# Patient Record
Sex: MALE | Race: BLACK OR AFRICAN AMERICAN | NOT HISPANIC OR LATINO | Employment: FULL TIME | URBAN - METROPOLITAN AREA
[De-identification: names, ages, dates, MRNs, and addresses within clinical notes are randomized per-mention and may not be internally consistent; named-entity substitution may affect disease eponyms.]

---

## 2019-04-22 ENCOUNTER — OFFICE VISIT (OUTPATIENT)
Dept: URGENT CARE | Facility: CLINIC | Age: 49
End: 2019-04-22
Payer: COMMERCIAL

## 2019-04-22 VITALS
DIASTOLIC BLOOD PRESSURE: 81 MMHG | RESPIRATION RATE: 16 BRPM | OXYGEN SATURATION: 98 % | HEIGHT: 70 IN | TEMPERATURE: 97 F | HEART RATE: 79 BPM | WEIGHT: 227 LBS | BODY MASS INDEX: 32.5 KG/M2 | SYSTOLIC BLOOD PRESSURE: 133 MMHG

## 2019-04-22 DIAGNOSIS — H10.9 CONJUNCTIVITIS OF LEFT EYE, UNSPECIFIED CONJUNCTIVITIS TYPE: Primary | ICD-10-CM

## 2019-04-22 PROCEDURE — 99204 OFFICE O/P NEW MOD 45 MIN: CPT | Mod: S$GLB,,, | Performed by: NURSE PRACTITIONER

## 2019-04-22 PROCEDURE — 99204 PR OFFICE/OUTPT VISIT, NEW, LEVL IV, 45-59 MIN: ICD-10-PCS | Mod: S$GLB,,, | Performed by: NURSE PRACTITIONER

## 2019-04-22 RX ORDER — METFORMIN HYDROCHLORIDE 500 MG/1
500 TABLET ORAL 2 TIMES DAILY WITH MEALS
COMMUNITY
End: 2020-09-08 | Stop reason: SDUPTHER

## 2019-04-22 RX ORDER — OFLOXACIN 3 MG/ML
1 SOLUTION/ DROPS OPHTHALMIC 4 TIMES DAILY
Qty: 10 ML | Refills: 0 | Status: SHIPPED | OUTPATIENT
Start: 2019-04-22 | End: 2019-04-29

## 2019-04-22 NOTE — PROGRESS NOTES
"Subjective:       Patient ID: Garret Munguia is a 49 y.o. male.    Vitals:  height is 5' 10" (1.778 m) and weight is 103 kg (227 lb). His oral temperature is 97.3 °F (36.3 °C). His blood pressure is 133/81 and his pulse is 79. His respiration is 16 and oxygen saturation is 98%.     Chief Complaint: Eye Pain (Lt)    Pt presents with Lt eye pain w/ redness X 4/20/19. Pt denies FB. Pt describes pain as irritated sensation, constant timing with assoc itching. He denies blurred vision.     Eye Pain    The left eye is affected. This is a new problem. The current episode started in the past 7 days. Associated symptoms include an eye discharge, eye redness and itching. Pertinent negatives include no blurred vision, double vision, fever, nausea, photophobia or vomiting. Treatments tried: visine drops, wanda.       Constitution: Negative for chills and fever.   HENT: Negative for congestion and sinus pain.    Eyes: Positive for eye discharge, eye itching, eye pain and eye redness. Negative for eye trauma, foreign body in eye, photophobia, vision loss, double vision, blurred vision and eyelid swelling.   Gastrointestinal: Negative for nausea and vomiting.   Skin: Negative for rash.   Allergic/Immunologic: Negative for seasonal allergies and itching.   Neurological: Negative for headaches.       Objective:      Physical Exam   Constitutional: He is oriented to person, place, and time. He appears well-developed and well-nourished.   HENT:   Head: Normocephalic and atraumatic.   Right Ear: External ear normal.   Left Ear: External ear normal.   Nose: Nose normal.   Mouth/Throat: Oropharynx is clear and moist.   Eyes: Pupils are equal, round, and reactive to light. EOM and lids are normal. Left conjunctiva is injected.   Neck: Trachea normal, full passive range of motion without pain and phonation normal. Neck supple.   Musculoskeletal: Normal range of motion.   Neurological: He is alert and oriented to person, place, and time. "   Skin: Skin is warm, dry and intact.   Psychiatric: He has a normal mood and affect. His speech is normal and behavior is normal. Judgment and thought content normal. Cognition and memory are normal.   Nursing note and vitals reviewed.      Assessment:       1. Conjunctivitis of left eye, unspecified conjunctivitis type        Plan:         Conjunctivitis of left eye, unspecified conjunctivitis type    Other orders  -     ofloxacin (OCUFLOX) 0.3 % ophthalmic solution; Place 1 drop into the left eye 4 (four) times daily. for 7 days  Dispense: 10 mL; Refill: 0

## 2019-04-22 NOTE — PATIENT INSTRUCTIONS
Conjunctivitis, Bacterial    You have an infection in the membranes covering the white part of the eye. This part of the eye is called the conjunctiva. The infection is called conjunctivitis. The most common symptoms of conjunctivitis include a thick, pus-like discharge from the eye, swollen eyelids, redness, eyelids sticking together upon awakening, and a gritty or scratchy feeling in the eye. Your infection was caused by bacteria. It may be treated with medicine. With treatment, the infection takes about 7 to 10 days to resolve.  Home care  · Use prescribed antibiotic eye drops or ointment as directed to treat the infection.  · Apply a warm compress (towel soaked in warm water) to the affected eye 3 to 4 times a day. Do this just before applying medicine to the eye.  · Use a warm, wet cloth to wipe away crusting of the eyelids in the morning. This is caused by mucus drainage during the night. You may also use saline irrigating solution or artificial tears to rinse away mucus in the eye. Do not put a patch over the eye.  · Wash your hands before and after touching the infected eye. This is to prevent spreading the infection to the other eye, and to other people. Do not share your towels or washcloths with others.  · You may use acetaminophen or ibuprofen to control pain, unless another medicine was prescribed. (Note: If you have chronic liver or kidney disease or have ever had a stomach ulcer or gastrointestinal bleeding, talk with your doctor before using these medicines.)  · Do not wear contact lenses until your eyes have healed and all symptoms are gone.  Follow-up care  Follow up with your healthcare provider, or as advised.  When to seek medical advice  Call your healthcare provider right away if any of these occur:  · Worsening vision  · Increasing pain in the eye  · Increasing swelling or redness of the eyelid  · Redness spreading around the eye  Date Last Reviewed: 6/14/2015  © 6127-4610 [employer]. [address] [place] [address], [place] [address]. All rights reserved. This information is not intended as a substitute for professional medical care. Always follow your healthcare professional's instructions.

## 2019-04-22 NOTE — LETTER
April 22, 2019      [facility]  [address]  [place] [address]  Phone: [phone]       Patient: Garret Munguia   YOB: 1970  Date of Visit: 04/22/2019    To Whom It May Concern:    ABIGAIL Munguia  was at [facility] on 04/22/2019. He may return to work/school on 4/24/19  with no restrictions. If you have any questions or concerns, or if I can be of further assistance, please do not hesitate to contact me.    Sincerely,    Felipa Koch, NP

## 2020-03-24 ENCOUNTER — OFFICE VISIT (OUTPATIENT)
Dept: URGENT CARE | Facility: CLINIC | Age: 50
End: 2020-03-24
Payer: COMMERCIAL

## 2020-03-24 VITALS
BODY MASS INDEX: 31.78 KG/M2 | HEIGHT: 71 IN | WEIGHT: 227 LBS | RESPIRATION RATE: 18 BRPM | DIASTOLIC BLOOD PRESSURE: 89 MMHG | HEART RATE: 83 BPM | OXYGEN SATURATION: 97 % | SYSTOLIC BLOOD PRESSURE: 136 MMHG | TEMPERATURE: 99 F

## 2020-03-24 DIAGNOSIS — R05.9 COUGH: ICD-10-CM

## 2020-03-24 DIAGNOSIS — R09.82 ALLERGIC RHINITIS WITH POSTNASAL DRIP: Primary | ICD-10-CM

## 2020-03-24 DIAGNOSIS — J30.9 ALLERGIC RHINITIS WITH POSTNASAL DRIP: Primary | ICD-10-CM

## 2020-03-24 PROCEDURE — 99214 OFFICE O/P EST MOD 30 MIN: CPT | Mod: S$GLB,,, | Performed by: NURSE PRACTITIONER

## 2020-03-24 PROCEDURE — 99214 PR OFFICE/OUTPT VISIT, EST, LEVL IV, 30-39 MIN: ICD-10-PCS | Mod: S$GLB,,, | Performed by: NURSE PRACTITIONER

## 2020-03-24 RX ORDER — CETIRIZINE HYDROCHLORIDE 10 MG/1
10 TABLET ORAL DAILY
Qty: 90 TABLET | Refills: 0 | Status: SHIPPED | OUTPATIENT
Start: 2020-03-24 | End: 2020-09-21

## 2020-03-24 RX ORDER — GUAIFENESIN 1200 MG/1
1200 TABLET, EXTENDED RELEASE ORAL 2 TIMES DAILY
Qty: 20 TABLET | Refills: 0 | Status: SHIPPED | OUTPATIENT
Start: 2020-03-24 | End: 2020-04-03

## 2020-03-24 RX ORDER — FLUTICASONE PROPIONATE 50 MCG
2 SPRAY, SUSPENSION (ML) NASAL DAILY
Qty: 16 G | Refills: 0 | Status: SHIPPED | OUTPATIENT
Start: 2020-03-24 | End: 2020-09-21

## 2020-03-24 NOTE — PROGRESS NOTES
"Subjective:       Patient ID: Garret Munguia is a 50 y.o. male.    Vitals:  height is 5' 11" (1.803 m) and weight is 103 kg (227 lb). His temperature is 98.7 °F (37.1 °C). His blood pressure is 136/89 and his pulse is 83. His respiration is 18 and oxygen saturation is 97%.     Chief Complaint: Sinus Problem    Patient presents today with complaints of post nasal drainage, "tickle" in his throat and a cough that started today. He denies f/c/n/v/d. He has not taken anything for his symptoms. He is concerned about COVID 10 and states that he came here tonight to have his family tested for the virus.     Sinus Problem   This is a new problem. The current episode started today. The problem is unchanged. There has been no fever. Associated symptoms include coughing. Pertinent negatives include no chills, congestion, headaches, shortness of breath, sinus pressure or sore throat. (Itchy throat , sinus drip ) Past treatments include nothing. The treatment provided no relief.       Constitution: Negative for chills, fatigue and fever.   HENT: Positive for postnasal drip. Negative for congestion, sinus pressure and sore throat.    Neck: Negative for painful lymph nodes.   Cardiovascular: Negative for chest pain and leg swelling.   Respiratory: Positive for cough. Negative for shortness of breath.    Gastrointestinal: Negative for nausea, vomiting and diarrhea.   Musculoskeletal: Negative for joint pain, joint swelling, muscle cramps and muscle ache.   Skin: Negative for color change, pale and rash.   Allergic/Immunologic: Negative for seasonal allergies.   Neurological: Negative for dizziness, history of vertigo, light-headedness, passing out and headaches.   Hematologic/Lymphatic: Negative for swollen lymph nodes, easy bruising/bleeding and history of blood clots. Does not bruise/bleed easily.   Psychiatric/Behavioral: Negative for nervous/anxious, sleep disturbance and depression. The patient is not nervous/anxious.      "   Objective:      Physical Exam   Constitutional: He is oriented to person, place, and time. He appears well-developed and well-nourished.   HENT:   Head: Normocephalic and atraumatic.   Right Ear: Tympanic membrane normal.   Left Ear: A middle ear effusion is present.   Nose: Mucosal edema present. Right sinus exhibits no maxillary sinus tenderness and no frontal sinus tenderness. Left sinus exhibits no maxillary sinus tenderness and no frontal sinus tenderness.   Mouth/Throat: Posterior oropharyngeal erythema and cobblestoning present.   Moderate amount of white/clear thick post nasal drainage noted   Eyes: No scleral icterus.   Cardiovascular: Normal rate, regular rhythm and normal heart sounds.   Pulmonary/Chest: Effort normal and breath sounds normal. No stridor. No respiratory distress. He has no wheezes. He has no rales.   Neurological: He is alert and oriented to person, place, and time.   Skin: Capillary refill takes less than 2 seconds.   Psychiatric: He has a normal mood and affect. His behavior is normal. Judgment and thought content normal.         Assessment:       1. Allergic rhinitis with postnasal drip    2. Cough        Plan:         Allergic rhinitis with postnasal drip  -     guaiFENesin 1,200 mg Ta12; Take 1,200 mg by mouth 2 (two) times daily. for 10 days  Dispense: 20 tablet; Refill: 0  -     cetirizine (ZYRTEC) 10 MG tablet; Take 1 tablet (10 mg total) by mouth once daily.  Dispense: 90 tablet; Refill: 0  -     fluticasone propionate (FLONASE) 50 mcg/actuation nasal spray; 2 sprays (100 mcg total) by Each Nostril route once daily.  Dispense: 16 g; Refill: 0    Cough  -     guaiFENesin 1,200 mg Ta12; Take 1,200 mg by mouth 2 (two) times daily. for 10 days  Dispense: 20 tablet; Refill: 0  -     cetirizine (ZYRTEC) 10 MG tablet; Take 1 tablet (10 mg total) by mouth once daily.  Dispense: 90 tablet; Refill: 0  -     fluticasone propionate (FLONASE) 50 mcg/actuation nasal spray; 2 sprays (100 mcg  total) by Each Nostril route once daily.  Dispense: 16 g; Refill: 0    Discussed with patient that he does not present with symptoms of COVID. Discussed with him the requirements for testing and the availability of the testing centers. Discussed symptoms to be concerned about and symptoms to be watchful for. Patient verbalizes understanding.

## 2020-05-05 DIAGNOSIS — J30.9 ALLERGIC RHINITIS WITH POSTNASAL DRIP: ICD-10-CM

## 2020-05-05 DIAGNOSIS — R09.82 ALLERGIC RHINITIS WITH POSTNASAL DRIP: ICD-10-CM

## 2020-05-05 DIAGNOSIS — R05.9 COUGH: ICD-10-CM

## 2020-05-11 RX ORDER — FLUTICASONE PROPIONATE 50 MCG
SPRAY, SUSPENSION (ML) NASAL
Qty: 16 G | Refills: 0 | OUTPATIENT
Start: 2020-05-11

## 2020-09-04 ENCOUNTER — OFFICE VISIT (OUTPATIENT)
Dept: URGENT CARE | Facility: CLINIC | Age: 50
End: 2020-09-04
Payer: COMMERCIAL

## 2020-09-04 ENCOUNTER — HOSPITAL ENCOUNTER (INPATIENT)
Facility: HOSPITAL | Age: 50
LOS: 3 days | Discharge: HOME HEALTH | DRG: 623 | End: 2020-09-08
Attending: EMERGENCY MEDICINE | Admitting: HOSPITALIST
Payer: COMMERCIAL

## 2020-09-04 VITALS
OXYGEN SATURATION: 98 % | HEIGHT: 71 IN | SYSTOLIC BLOOD PRESSURE: 114 MMHG | WEIGHT: 214 LBS | DIASTOLIC BLOOD PRESSURE: 74 MMHG | HEART RATE: 90 BPM | RESPIRATION RATE: 16 BRPM | BODY MASS INDEX: 29.96 KG/M2 | TEMPERATURE: 99 F

## 2020-09-04 DIAGNOSIS — L08.9 DIABETIC INFECTION OF RIGHT FOOT: ICD-10-CM

## 2020-09-04 DIAGNOSIS — M79.89 RIGHT LEG SWELLING: ICD-10-CM

## 2020-09-04 DIAGNOSIS — E11.9 TYPE 2 DIABETES MELLITUS WITHOUT COMPLICATION, UNSPECIFIED WHETHER LONG TERM INSULIN USE: Primary | ICD-10-CM

## 2020-09-04 DIAGNOSIS — L97.519 FOOT ULCERATION, RIGHT, WITH UNSPECIFIED SEVERITY: ICD-10-CM

## 2020-09-04 DIAGNOSIS — L97.511 DIABETIC ULCER OF RIGHT FOOT ASSOCIATED WITH DIABETES MELLITUS DUE TO UNDERLYING CONDITION, LIMITED TO BREAKDOWN OF SKIN, UNSPECIFIED PART OF FOOT: ICD-10-CM

## 2020-09-04 DIAGNOSIS — E11.628 DIABETIC INFECTION OF RIGHT FOOT: ICD-10-CM

## 2020-09-04 DIAGNOSIS — E08.621 DIABETIC ULCER OF RIGHT FOOT ASSOCIATED WITH DIABETES MELLITUS DUE TO UNDERLYING CONDITION, LIMITED TO BREAKDOWN OF SKIN, UNSPECIFIED PART OF FOOT: ICD-10-CM

## 2020-09-04 DIAGNOSIS — L97.509 TYPE 2 DIABETES MELLITUS WITH FOOT ULCER, WITHOUT LONG-TERM CURRENT USE OF INSULIN: Primary | ICD-10-CM

## 2020-09-04 DIAGNOSIS — E11.621 TYPE 2 DIABETES MELLITUS WITH FOOT ULCER, WITHOUT LONG-TERM CURRENT USE OF INSULIN: Primary | ICD-10-CM

## 2020-09-04 LAB
ALBUMIN SERPL BCP-MCNC: 3.3 G/DL (ref 3.5–5.2)
ALP SERPL-CCNC: 80 U/L (ref 55–135)
ALT SERPL W/O P-5'-P-CCNC: 15 U/L (ref 10–44)
ANION GAP SERPL CALC-SCNC: 9 MMOL/L (ref 8–16)
AST SERPL-CCNC: 13 U/L (ref 10–40)
B-OH-BUTYR BLD STRIP-SCNC: 0.2 MMOL/L (ref 0–0.5)
BASOPHILS # BLD AUTO: 0.02 K/UL (ref 0–0.2)
BASOPHILS NFR BLD: 0.2 % (ref 0–1.9)
BILIRUB SERPL-MCNC: 0.3 MG/DL (ref 0.1–1)
BUN SERPL-MCNC: 13 MG/DL (ref 6–20)
CALCIUM SERPL-MCNC: 9.5 MG/DL (ref 8.7–10.5)
CHLORIDE SERPL-SCNC: 99 MMOL/L (ref 95–110)
CO2 SERPL-SCNC: 30 MMOL/L (ref 23–29)
CREAT SERPL-MCNC: 1 MG/DL (ref 0.5–1.4)
DIFFERENTIAL METHOD: ABNORMAL
EOSINOPHIL # BLD AUTO: 0.1 K/UL (ref 0–0.5)
EOSINOPHIL NFR BLD: 1.2 % (ref 0–8)
ERYTHROCYTE [DISTWIDTH] IN BLOOD BY AUTOMATED COUNT: 11.2 % (ref 11.5–14.5)
EST. GFR  (AFRICAN AMERICAN): >60 ML/MIN/1.73 M^2
EST. GFR  (NON AFRICAN AMERICAN): >60 ML/MIN/1.73 M^2
GLUCOSE SERPL-MCNC: 287 MG/DL (ref 70–110)
GLUCOSE SERPL-MCNC: 398 MG/DL (ref 70–110)
HCT VFR BLD AUTO: 39.5 % (ref 40–54)
HGB BLD-MCNC: 12.2 G/DL (ref 14–18)
IMM GRANULOCYTES # BLD AUTO: 0.02 K/UL (ref 0–0.04)
IMM GRANULOCYTES NFR BLD AUTO: 0.2 % (ref 0–0.5)
LACTATE SERPL-SCNC: 0.7 MMOL/L (ref 0.5–2.2)
LACTATE SERPL-SCNC: 0.8 MMOL/L (ref 0.5–2.2)
LYMPHOCYTES # BLD AUTO: 1.9 K/UL (ref 1–4.8)
LYMPHOCYTES NFR BLD: 23.7 % (ref 18–48)
MCH RBC QN AUTO: 27.4 PG (ref 27–31)
MCHC RBC AUTO-ENTMCNC: 30.9 G/DL (ref 32–36)
MCV RBC AUTO: 89 FL (ref 82–98)
MONOCYTES # BLD AUTO: 0.7 K/UL (ref 0.3–1)
MONOCYTES NFR BLD: 8.8 % (ref 4–15)
NEUTROPHILS # BLD AUTO: 5.3 K/UL (ref 1.8–7.7)
NEUTROPHILS NFR BLD: 65.9 % (ref 38–73)
NRBC BLD-RTO: 0 /100 WBC
PLATELET # BLD AUTO: 344 K/UL (ref 150–350)
PMV BLD AUTO: 8.5 FL (ref 9.2–12.9)
POTASSIUM SERPL-SCNC: 4.8 MMOL/L (ref 3.5–5.1)
PROT SERPL-MCNC: 7.3 G/DL (ref 6–8.4)
RBC # BLD AUTO: 4.45 M/UL (ref 4.6–6.2)
SARS-COV-2 RDRP RESP QL NAA+PROBE: NEGATIVE
SODIUM SERPL-SCNC: 138 MMOL/L (ref 136–145)
WBC # BLD AUTO: 8.1 K/UL (ref 3.9–12.7)

## 2020-09-04 PROCEDURE — 36415 COLL VENOUS BLD VENIPUNCTURE: CPT

## 2020-09-04 PROCEDURE — 82962 GLUCOSE BLOOD TEST: CPT | Mod: ,,, | Performed by: NURSE PRACTITIONER

## 2020-09-04 PROCEDURE — 87040 BLOOD CULTURE FOR BACTERIA: CPT

## 2020-09-04 PROCEDURE — 82010 KETONE BODYS QUAN: CPT

## 2020-09-04 PROCEDURE — G0378 HOSPITAL OBSERVATION PER HR: HCPCS

## 2020-09-04 PROCEDURE — 96365 THER/PROPH/DIAG IV INF INIT: CPT | Performed by: EMERGENCY MEDICINE

## 2020-09-04 PROCEDURE — 25000003 PHARM REV CODE 250: Performed by: EMERGENCY MEDICINE

## 2020-09-04 PROCEDURE — 94761 N-INVAS EAR/PLS OXIMETRY MLT: CPT

## 2020-09-04 PROCEDURE — 87186 SC STD MICRODIL/AGAR DIL: CPT | Mod: 59

## 2020-09-04 PROCEDURE — U0002 COVID-19 LAB TEST NON-CDC: HCPCS

## 2020-09-04 PROCEDURE — 99214 OFFICE O/P EST MOD 30 MIN: CPT | Mod: S$GLB,,, | Performed by: NURSE PRACTITIONER

## 2020-09-04 PROCEDURE — 80053 COMPREHEN METABOLIC PANEL: CPT

## 2020-09-04 PROCEDURE — 87077 CULTURE AEROBIC IDENTIFY: CPT | Mod: 59

## 2020-09-04 PROCEDURE — 96375 TX/PRO/DX INJ NEW DRUG ADDON: CPT | Performed by: EMERGENCY MEDICINE

## 2020-09-04 PROCEDURE — 63600175 PHARM REV CODE 636 W HCPCS: Performed by: EMERGENCY MEDICINE

## 2020-09-04 PROCEDURE — 99285 EMERGENCY DEPT VISIT HI MDM: CPT | Mod: 25

## 2020-09-04 PROCEDURE — 82962 POCT GLUCOSE, HAND-HELD DEVICE: ICD-10-PCS | Mod: ,,, | Performed by: NURSE PRACTITIONER

## 2020-09-04 PROCEDURE — 85025 COMPLETE CBC W/AUTO DIFF WBC: CPT

## 2020-09-04 PROCEDURE — 83605 ASSAY OF LACTIC ACID: CPT

## 2020-09-04 PROCEDURE — 87070 CULTURE OTHR SPECIMN AEROBIC: CPT

## 2020-09-04 PROCEDURE — 99214 PR OFFICE/OUTPT VISIT, EST, LEVL IV, 30-39 MIN: ICD-10-PCS | Mod: S$GLB,,, | Performed by: NURSE PRACTITIONER

## 2020-09-04 RX ORDER — SODIUM CHLORIDE 0.9 % (FLUSH) 0.9 %
10 SYRINGE (ML) INJECTION
Status: DISCONTINUED | OUTPATIENT
Start: 2020-09-04 | End: 2020-09-08 | Stop reason: HOSPADM

## 2020-09-04 RX ORDER — INSULIN ASPART 100 [IU]/ML
1-10 INJECTION, SOLUTION INTRAVENOUS; SUBCUTANEOUS
Status: DISCONTINUED | OUTPATIENT
Start: 2020-09-04 | End: 2020-09-08 | Stop reason: HOSPADM

## 2020-09-04 RX ORDER — ACETAMINOPHEN 325 MG/1
650 TABLET ORAL EVERY 6 HOURS PRN
Status: DISCONTINUED | OUTPATIENT
Start: 2020-09-04 | End: 2020-09-08 | Stop reason: HOSPADM

## 2020-09-04 RX ORDER — ONDANSETRON 2 MG/ML
4 INJECTION INTRAMUSCULAR; INTRAVENOUS EVERY 6 HOURS PRN
Status: DISCONTINUED | OUTPATIENT
Start: 2020-09-04 | End: 2020-09-08 | Stop reason: HOSPADM

## 2020-09-04 RX ORDER — POTASSIUM CHLORIDE 1.5 G/1.58G
40 POWDER, FOR SOLUTION ORAL
Status: DISCONTINUED | OUTPATIENT
Start: 2020-09-04 | End: 2020-09-08 | Stop reason: HOSPADM

## 2020-09-04 RX ORDER — HYDROCODONE BITARTRATE AND ACETAMINOPHEN 5; 325 MG/1; MG/1
1 TABLET ORAL EVERY 6 HOURS PRN
Status: DISCONTINUED | OUTPATIENT
Start: 2020-09-04 | End: 2020-09-08 | Stop reason: HOSPADM

## 2020-09-04 RX ORDER — IBUPROFEN 200 MG
16 TABLET ORAL
Status: DISCONTINUED | OUTPATIENT
Start: 2020-09-04 | End: 2020-09-08 | Stop reason: HOSPADM

## 2020-09-04 RX ORDER — IBUPROFEN 200 MG
24 TABLET ORAL
Status: DISCONTINUED | OUTPATIENT
Start: 2020-09-04 | End: 2020-09-08 | Stop reason: HOSPADM

## 2020-09-04 RX ORDER — LANOLIN ALCOHOL/MO/W.PET/CERES
800 CREAM (GRAM) TOPICAL
Status: DISCONTINUED | OUTPATIENT
Start: 2020-09-04 | End: 2020-09-08 | Stop reason: HOSPADM

## 2020-09-04 RX ORDER — ACETAMINOPHEN 325 MG/1
650 TABLET ORAL EVERY 4 HOURS PRN
Status: DISCONTINUED | OUTPATIENT
Start: 2020-09-04 | End: 2020-09-08 | Stop reason: HOSPADM

## 2020-09-04 RX ORDER — TALC
9 POWDER (GRAM) TOPICAL NIGHTLY PRN
Status: DISCONTINUED | OUTPATIENT
Start: 2020-09-04 | End: 2020-09-08 | Stop reason: HOSPADM

## 2020-09-04 RX ORDER — GLUCAGON 1 MG
1 KIT INJECTION
Status: DISCONTINUED | OUTPATIENT
Start: 2020-09-04 | End: 2020-09-08 | Stop reason: HOSPADM

## 2020-09-04 RX ORDER — AMOXICILLIN 250 MG
1 CAPSULE ORAL 2 TIMES DAILY
Status: DISCONTINUED | OUTPATIENT
Start: 2020-09-05 | End: 2020-09-08 | Stop reason: HOSPADM

## 2020-09-04 RX ADMIN — PIPERACILLIN SODIUM AND TAZOBACTAM SODIUM 4.5 G: 4; .5 INJECTION, POWDER, LYOPHILIZED, FOR SOLUTION INTRAVENOUS at 09:09

## 2020-09-04 RX ADMIN — VANCOMYCIN HYDROCHLORIDE 2250 MG: 1.25 INJECTION, POWDER, LYOPHILIZED, FOR SOLUTION INTRAVENOUS at 09:09

## 2020-09-04 NOTE — PROGRESS NOTES
"Subjective:       Patient ID: Garret Munguia is a 50 y.o. male.    Vitals:  height is 5' 11" (1.803 m) and weight is 97.1 kg (214 lb). His oral temperature is 99.1 °F (37.3 °C). His blood pressure is 114/74 and his pulse is 90. His respiration is 16 and oxygen saturation is 98%.     Chief Complaint: Recurrent Skin Infections (also c/o left eye dryness and post nasal drip)    Patient complains of a "sore" on the bottom of his right foot for 2 weeks. Patient reports he is diabetic and does not have a PCP. Denies fever.     Abscess  Chronicity:  NewProgression Since Onset: gradually improving  Location:  Foot  Associated Symptoms: no fever, no chills  Characteristics: redness    Relieved by:  Topical antibiotics      Constitution: Negative for chills, fatigue and fever.   HENT: Negative for congestion and sore throat.    Neck: Negative for painful lymph nodes.   Cardiovascular: Negative for chest pain and leg swelling.   Eyes: Negative for double vision and blurred vision.   Respiratory: Negative for cough and shortness of breath.    Gastrointestinal: Negative for nausea, vomiting and diarrhea.   Genitourinary: Negative for dysuria, frequency and urgency.   Musculoskeletal: Negative for joint pain, joint swelling, muscle cramps and muscle ache.   Skin: Positive for lesion. Negative for color change, pale, rash and abscess.   Allergic/Immunologic: Negative for seasonal allergies.   Neurological: Negative for dizziness, history of vertigo, light-headedness, passing out and headaches.   Hematologic/Lymphatic: Negative for swollen lymph nodes, easy bruising/bleeding and history of blood clots. Does not bruise/bleed easily.   Psychiatric/Behavioral: Negative for nervous/anxious, sleep disturbance and depression. The patient is not nervous/anxious.        Objective:      Physical Exam   Constitutional: He is oriented to person, place, and time. He appears well-developed. He is cooperative.  Non-toxic appearance. He does not " appear ill. No distress.   HENT:   Head: Normocephalic and atraumatic.   Ears:   Right Ear: Hearing, tympanic membrane, external ear and ear canal normal.   Left Ear: Hearing, tympanic membrane, external ear and ear canal normal.   Nose: Nose normal. No mucosal edema, rhinorrhea or nasal deformity. No epistaxis. Right sinus exhibits no maxillary sinus tenderness and no frontal sinus tenderness. Left sinus exhibits no maxillary sinus tenderness and no frontal sinus tenderness.   Mouth/Throat: Uvula is midline, oropharynx is clear and moist and mucous membranes are normal. No trismus in the jaw. Normal dentition. No uvula swelling. No posterior oropharyngeal erythema.   Eyes: Conjunctivae and lids are normal. Right eye exhibits no discharge. Left eye exhibits no discharge. No scleral icterus.   Neck: Trachea normal, normal range of motion, full passive range of motion without pain and phonation normal. Neck supple.   Cardiovascular: Normal rate, regular rhythm, normal heart sounds and normal pulses.   Pulmonary/Chest: Effort normal and breath sounds normal. No respiratory distress.   Abdominal: Soft. Normal appearance and bowel sounds are normal. He exhibits no distension, no pulsatile midline mass and no mass. There is no abdominal tenderness.   Musculoskeletal: Normal range of motion.         General: No deformity.   Neurological: He is alert and oriented to person, place, and time. He exhibits normal muscle tone. Coordination normal.   Skin: Skin is warm, dry, intact, not diaphoretic and not pale.         Comments: Large ulcer noted to plantar aspect of right foot with surrounding erythema and swelling to right foot and ankle.  Psychiatric: His speech is normal and behavior is normal. Judgment and thought content normal.   Nursing note and vitals reviewed.            Assessment:       1. Type 2 diabetes mellitus without complication, unspecified whether long term insulin use    2. Diabetic ulcer of right foot  associated with diabetes mellitus due to underlying condition, limited to breakdown of skin, unspecified part of foot        Plan:       CBG = 398    Advised patient that they need to go to the Emergency Room for further evaluation for uncontrolled diabetes and diabetic ulcer to right foot.  Offered pt EMS transport to the hospital; however, the patient declined and will drive self or have family member drive to the ER. Pt is alert and oriented to person, place, and situation.  I explained, in layman's terms, the risks associated with private vehicle transport such as, but not limited to: respiratory arrest, cardiac arrest, and death. The patient voices these risks back to me.   Patient is not altered and is not under the influence and is competent to make own decisions.     Type 2 diabetes mellitus without complication, unspecified whether long term insulin use  -     POCT Glucose, Hand-Held Device    Diabetic ulcer of right foot associated with diabetes mellitus due to underlying condition, limited to breakdown of skin, unspecified part of foot

## 2020-09-05 ENCOUNTER — OUTSIDE PLACE OF SERVICE (OUTPATIENT)
Dept: INFECTIOUS DISEASES | Facility: CLINIC | Age: 50
End: 2020-09-05
Payer: COMMERCIAL

## 2020-09-05 DIAGNOSIS — E11.628 DIABETIC INFECTION OF RIGHT FOOT: ICD-10-CM

## 2020-09-05 DIAGNOSIS — L08.9 DIABETIC INFECTION OF RIGHT FOOT: ICD-10-CM

## 2020-09-05 DIAGNOSIS — L97.509 TYPE 2 DIABETES MELLITUS WITH FOOT ULCER, WITHOUT LONG-TERM CURRENT USE OF INSULIN: ICD-10-CM

## 2020-09-05 DIAGNOSIS — E11.621 TYPE 2 DIABETES MELLITUS WITH FOOT ULCER, WITHOUT LONG-TERM CURRENT USE OF INSULIN: ICD-10-CM

## 2020-09-05 LAB
ALBUMIN SERPL BCP-MCNC: 2.9 G/DL (ref 3.5–5.2)
ALP SERPL-CCNC: 69 U/L (ref 55–135)
ALT SERPL W/O P-5'-P-CCNC: 11 U/L (ref 10–44)
ANION GAP SERPL CALC-SCNC: 9 MMOL/L (ref 8–16)
AST SERPL-CCNC: 11 U/L (ref 10–40)
BASOPHILS # BLD AUTO: 0.02 K/UL (ref 0–0.2)
BASOPHILS NFR BLD: 0.2 % (ref 0–1.9)
BILIRUB SERPL-MCNC: 0.5 MG/DL (ref 0.1–1)
BUN SERPL-MCNC: 10 MG/DL (ref 6–20)
CALCIUM SERPL-MCNC: 8.9 MG/DL (ref 8.7–10.5)
CHLORIDE SERPL-SCNC: 101 MMOL/L (ref 95–110)
CO2 SERPL-SCNC: 29 MMOL/L (ref 23–29)
CREAT SERPL-MCNC: 0.9 MG/DL (ref 0.5–1.4)
CRP SERPL-MCNC: 90.7 MG/L (ref 0–8.2)
DIFFERENTIAL METHOD: ABNORMAL
EOSINOPHIL # BLD AUTO: 0.1 K/UL (ref 0–0.5)
EOSINOPHIL NFR BLD: 1.4 % (ref 0–8)
ERYTHROCYTE [DISTWIDTH] IN BLOOD BY AUTOMATED COUNT: 11.3 % (ref 11.5–14.5)
EST. GFR  (AFRICAN AMERICAN): >60 ML/MIN/1.73 M^2
EST. GFR  (NON AFRICAN AMERICAN): >60 ML/MIN/1.73 M^2
ESTIMATED AVG GLUCOSE: 326 MG/DL (ref 68–131)
GLUCOSE SERPL-MCNC: 250 MG/DL (ref 70–110)
HBA1C MFR BLD HPLC: 13 % (ref 4–5.6)
HCT VFR BLD AUTO: 37.8 % (ref 40–54)
HGB BLD-MCNC: 11.5 G/DL (ref 14–18)
IMM GRANULOCYTES # BLD AUTO: 0.02 K/UL (ref 0–0.04)
IMM GRANULOCYTES NFR BLD AUTO: 0.2 % (ref 0–0.5)
LYMPHOCYTES # BLD AUTO: 1.9 K/UL (ref 1–4.8)
LYMPHOCYTES NFR BLD: 23 % (ref 18–48)
MAGNESIUM SERPL-MCNC: 1.9 MG/DL (ref 1.6–2.6)
MCH RBC QN AUTO: 26.9 PG (ref 27–31)
MCHC RBC AUTO-ENTMCNC: 30.4 G/DL (ref 32–36)
MCV RBC AUTO: 89 FL (ref 82–98)
MONOCYTES # BLD AUTO: 0.7 K/UL (ref 0.3–1)
MONOCYTES NFR BLD: 8.9 % (ref 4–15)
NEUTROPHILS # BLD AUTO: 5.5 K/UL (ref 1.8–7.7)
NEUTROPHILS NFR BLD: 66.3 % (ref 38–73)
NRBC BLD-RTO: 0 /100 WBC
PHOSPHATE SERPL-MCNC: 3.8 MG/DL (ref 2.7–4.5)
PLATELET # BLD AUTO: 323 K/UL (ref 150–350)
PMV BLD AUTO: 8.4 FL (ref 9.2–12.9)
POCT GLUCOSE: 230 MG/DL (ref 70–110)
POCT GLUCOSE: 233 MG/DL (ref 70–110)
POCT GLUCOSE: 248 MG/DL (ref 70–110)
POCT GLUCOSE: 257 MG/DL (ref 70–110)
POCT GLUCOSE: 293 MG/DL (ref 70–110)
POTASSIUM SERPL-SCNC: 4.2 MMOL/L (ref 3.5–5.1)
PROT SERPL-MCNC: 6.7 G/DL (ref 6–8.4)
RBC # BLD AUTO: 4.27 M/UL (ref 4.6–6.2)
SODIUM SERPL-SCNC: 139 MMOL/L (ref 136–145)
WBC # BLD AUTO: 8.31 K/UL (ref 3.9–12.7)

## 2020-09-05 PROCEDURE — 25000003 PHARM REV CODE 250: Performed by: INTERNAL MEDICINE

## 2020-09-05 PROCEDURE — 96376 TX/PRO/DX INJ SAME DRUG ADON: CPT | Performed by: EMERGENCY MEDICINE

## 2020-09-05 PROCEDURE — 99900037 HC PT THERAPY SCREENING (STAT)

## 2020-09-05 PROCEDURE — 99253 IP/OBS CNSLTJ NEW/EST LOW 45: CPT | Mod: S$GLB,,, | Performed by: INTERNAL MEDICINE

## 2020-09-05 PROCEDURE — 99253 PR INITIAL INPATIENT CONSULT,LEVL III: ICD-10-PCS | Mod: S$GLB,,, | Performed by: INTERNAL MEDICINE

## 2020-09-05 PROCEDURE — 63600175 PHARM REV CODE 636 W HCPCS: Performed by: INTERNAL MEDICINE

## 2020-09-05 PROCEDURE — 25000003 PHARM REV CODE 250: Performed by: NURSE PRACTITIONER

## 2020-09-05 PROCEDURE — 85025 COMPLETE CBC W/AUTO DIFF WBC: CPT

## 2020-09-05 PROCEDURE — 83036 HEMOGLOBIN GLYCOSYLATED A1C: CPT

## 2020-09-05 PROCEDURE — 25500020 PHARM REV CODE 255: Performed by: HOSPITALIST

## 2020-09-05 PROCEDURE — 86140 C-REACTIVE PROTEIN: CPT

## 2020-09-05 PROCEDURE — 80053 COMPREHEN METABOLIC PANEL: CPT

## 2020-09-05 PROCEDURE — 84100 ASSAY OF PHOSPHORUS: CPT

## 2020-09-05 PROCEDURE — 25000003 PHARM REV CODE 250: Performed by: PODIATRIST

## 2020-09-05 PROCEDURE — 94761 N-INVAS EAR/PLS OXIMETRY MLT: CPT

## 2020-09-05 PROCEDURE — 83735 ASSAY OF MAGNESIUM: CPT

## 2020-09-05 PROCEDURE — 12000002 HC ACUTE/MED SURGE SEMI-PRIVATE ROOM

## 2020-09-05 PROCEDURE — 63600175 PHARM REV CODE 636 W HCPCS: Performed by: NURSE PRACTITIONER

## 2020-09-05 PROCEDURE — C9399 UNCLASSIFIED DRUGS OR BIOLOG: HCPCS | Performed by: NURSE PRACTITIONER

## 2020-09-05 PROCEDURE — A9585 GADOBUTROL INJECTION: HCPCS | Performed by: HOSPITALIST

## 2020-09-05 PROCEDURE — 96372 THER/PROPH/DIAG INJ SC/IM: CPT | Mod: 59 | Performed by: EMERGENCY MEDICINE

## 2020-09-05 PROCEDURE — 36415 COLL VENOUS BLD VENIPUNCTURE: CPT

## 2020-09-05 RX ORDER — GADOBUTROL 604.72 MG/ML
9 INJECTION INTRAVENOUS
Status: COMPLETED | OUTPATIENT
Start: 2020-09-05 | End: 2020-09-05

## 2020-09-05 RX ADMIN — COLLAGENASE SANTYL: 250 OINTMENT TOPICAL at 04:09

## 2020-09-05 RX ADMIN — VANCOMYCIN HYDROCHLORIDE 1500 MG: 1.5 INJECTION, POWDER, LYOPHILIZED, FOR SOLUTION INTRAVENOUS at 09:09

## 2020-09-05 RX ADMIN — DOCUSATE SODIUM 50 MG AND SENNOSIDES 8.6 MG 1 TABLET: 8.6; 5 TABLET, FILM COATED ORAL at 12:09

## 2020-09-05 RX ADMIN — INSULIN DETEMIR 10 UNITS: 100 INJECTION, SOLUTION SUBCUTANEOUS at 08:09

## 2020-09-05 RX ADMIN — INSULIN ASPART 3 UNITS: 100 INJECTION, SOLUTION INTRAVENOUS; SUBCUTANEOUS at 12:09

## 2020-09-05 RX ADMIN — INSULIN ASPART 4 UNITS: 100 INJECTION, SOLUTION INTRAVENOUS; SUBCUTANEOUS at 06:09

## 2020-09-05 RX ADMIN — GADOBUTROL 9 ML: 604.72 INJECTION INTRAVENOUS at 11:09

## 2020-09-05 RX ADMIN — PIPERACILLIN SODIUM AND TAZOBACTAM SODIUM 4.5 G: 4; .5 INJECTION, POWDER, LYOPHILIZED, FOR SOLUTION INTRAVENOUS at 04:09

## 2020-09-05 RX ADMIN — INSULIN ASPART 2 UNITS: 100 INJECTION, SOLUTION INTRAVENOUS; SUBCUTANEOUS at 08:09

## 2020-09-05 RX ADMIN — INSULIN ASPART 6 UNITS: 100 INJECTION, SOLUTION INTRAVENOUS; SUBCUTANEOUS at 12:09

## 2020-09-05 RX ADMIN — PIPERACILLIN SODIUM AND TAZOBACTAM SODIUM 4.5 G: 4; .5 INJECTION, POWDER, LYOPHILIZED, FOR SOLUTION INTRAVENOUS at 08:09

## 2020-09-05 RX ADMIN — INSULIN ASPART 4 UNITS: 100 INJECTION, SOLUTION INTRAVENOUS; SUBCUTANEOUS at 05:09

## 2020-09-05 RX ADMIN — DOCUSATE SODIUM 50 MG AND SENNOSIDES 8.6 MG 1 TABLET: 8.6; 5 TABLET, FILM COATED ORAL at 08:09

## 2020-09-05 NOTE — SUBJECTIVE & OBJECTIVE
Interval History:  Seen and examined.  Pt denies any pain to right foot.  Denies any chest pain or SOB.  No nausea or vomiting.  Discussed with podiatry-MRI ordered and completed with MTP joint effusion noted associated with elevated CRP-podiatrist concern for septic joint.  Pt is S/P debridement-very neuropathic with no pain during extensive debridement.  Will need wound VAC.    Review of Systems   Constitutional: Negative for chills and fever.   HENT: Negative for trouble swallowing.    Respiratory: Negative for cough and shortness of breath.    Cardiovascular: Negative for chest pain.   Gastrointestinal: Negative for nausea and vomiting.   Genitourinary: Negative for dysuria.   Musculoskeletal: Positive for joint swelling. Negative for arthralgias and myalgias.   Skin: Positive for wound.   Neurological: Negative for weakness and headaches.   Psychiatric/Behavioral: Negative for confusion.     Objective:     Vital Signs (Most Recent):  Temp: 98.1 °F (36.7 °C) (09/05/20 0820)  Pulse: 84 (09/05/20 0820)  Resp: 18 (09/05/20 0820)  BP: 125/77 (09/05/20 0820)  SpO2: 97 % (09/05/20 0820) Vital Signs (24h Range):  Temp:  [96.7 °F (35.9 °C)-99.1 °F (37.3 °C)] 98.1 °F (36.7 °C)  Pulse:  [84-93] 84  Resp:  [16-20] 18  SpO2:  [95 %-99 %] 97 %  BP: (114-164)/(74-90) 125/77     Weight: 98.2 kg (216 lb 7.9 oz)  Body mass index is 30.19 kg/m².  No intake or output data in the 24 hours ending 09/05/20 1412   Physical Exam  Vitals signs and nursing note reviewed.   Constitutional:       Appearance: Normal appearance. He is normal weight.   HENT:      Head: Normocephalic and atraumatic.      Mouth/Throat:      Mouth: Mucous membranes are moist.      Pharynx: Oropharynx is clear.   Eyes:      Extraocular Movements: Extraocular movements intact.      Conjunctiva/sclera: Conjunctivae normal.      Pupils: Pupils are equal, round, and reactive to light.   Neck:      Musculoskeletal: Normal range of motion and neck supple.    Cardiovascular:      Rate and Rhythm: Normal rate and regular rhythm.      Pulses: Normal pulses.      Heart sounds: Normal heart sounds.   Pulmonary:      Effort: Pulmonary effort is normal.      Breath sounds: Normal breath sounds.   Abdominal:      General: Abdomen is flat. Bowel sounds are normal.      Palpations: Abdomen is soft.   Musculoskeletal: Normal range of motion.   Skin:     General: Skin is warm.      Capillary Refill: Capillary refill takes less than 2 seconds.      Comments: Wound noted to right MTP joint with white slough, boggy to touch.  With mild surrounding erythema.   Neurological:      Mental Status: He is alert.             Significant Labs:   CBC:   Recent Labs   Lab 09/04/20  1832 09/05/20  0605   WBC 8.10 8.31   HGB 12.2* 11.5*   HCT 39.5* 37.8*    323     CMP:   Recent Labs   Lab 09/04/20  1833 09/05/20  0605    139   K 4.8 4.2   CL 99 101   CO2 30* 29   * 250*   BUN 13 10   CREATININE 1.0 0.9   CALCIUM 9.5 8.9   PROT 7.3 6.7   ALBUMIN 3.3* 2.9*   BILITOT 0.3 0.5   ALKPHOS 80 69   AST 13 11   ALT 15 11   ANIONGAP 9 9   EGFRNONAA >60 >60       Significant Imaging: I have reviewed all pertinent imaging results/findings within the past 24 hours.

## 2020-09-05 NOTE — CONSULTS
Food & Nutrition  Education    Diet Education: diabetic diet  Time Spent: 0 minutes  Learners: patient      Nutrition Education provided with handouts: type 2 diabetes nutrition therapy       Comments: Education was not provided, working remotely. Pt on PO diet, no intake recorded. Noted 4.6% weight loss in 6 months (not significant). Terrell score-19, toe ulceration, no severe.     Follow-Up: Yes, 9/07/2020    Please Re-consult as needed    Thanks!

## 2020-09-05 NOTE — SUBJECTIVE & OBJECTIVE
Past Medical History:   Diagnosis Date    Diabetes mellitus, type 2        Past Surgical History:   Procedure Laterality Date    CERVICAL SPINE SURGERY         Review of patient's allergies indicates:  No Known Allergies    No current facility-administered medications on file prior to encounter.      Current Outpatient Medications on File Prior to Encounter   Medication Sig    cetirizine (ZYRTEC) 10 MG tablet Take 1 tablet (10 mg total) by mouth once daily. (Patient not taking: Reported on 9/4/2020)    fluticasone propionate (FLONASE) 50 mcg/actuation nasal spray 2 sprays (100 mcg total) by Each Nostril route once daily. (Patient not taking: Reported on 9/4/2020)    metFORMIN (GLUCOPHAGE) 500 MG tablet Take 500 mg by mouth 2 (two) times daily with meals.     Family History     None        Tobacco Use    Smoking status: Never Smoker   Substance and Sexual Activity    Alcohol use: Not on file    Drug use: Not on file    Sexual activity: Not on file     Review of Systems   Constitutional: Positive for activity change. Negative for chills, diaphoresis and fever.   HENT: Negative for congestion, nosebleeds and tinnitus.    Eyes: Negative for photophobia and visual disturbance.   Respiratory: Negative for cough, chest tightness, shortness of breath and wheezing.    Cardiovascular: Negative for chest pain, palpitations and leg swelling.   Gastrointestinal: Negative for abdominal distention, abdominal pain, constipation, diarrhea, nausea and vomiting.   Endocrine: Negative for cold intolerance and heat intolerance.   Genitourinary: Negative for difficulty urinating, dysuria, frequency, hematuria and urgency.   Musculoskeletal: Positive for arthralgias. Negative for back pain and myalgias.   Skin: Positive for color change and wound. Negative for pallor and rash.   Allergic/Immunologic: Negative for immunocompromised state.   Neurological: Negative for dizziness, tremors, facial asymmetry, speech difficulty and  weakness.   Hematological: Negative for adenopathy. Does not bruise/bleed easily.   Psychiatric/Behavioral: Negative for confusion and sleep disturbance. The patient is not nervous/anxious.      Objective:     Vital Signs (Most Recent):  Temp: 98.6 °F (37 °C) (09/04/20 1810)  Pulse: 89 (09/04/20 2255)  Resp: 18 (09/04/20 1810)  BP: (!) 164/83 (09/04/20 2144)  SpO2: 96 % (09/04/20 2255) Vital Signs (24h Range):  Temp:  [98.6 °F (37 °C)-99.1 °F (37.3 °C)] 98.6 °F (37 °C)  Pulse:  [89-91] 89  Resp:  [16-18] 18  SpO2:  [96 %-98 %] 96 %  BP: (114-164)/(74-90) 164/83     Weight: 97.1 kg (214 lb)  Body mass index is 29.85 kg/m².    Physical Exam  Vitals signs and nursing note reviewed.   Constitutional:       General: He is not in acute distress.     Appearance: He is well-developed. He is not diaphoretic.   HENT:      Head: Normocephalic.      Mouth/Throat:      Pharynx: Oropharynx is clear. No oropharyngeal exudate or posterior oropharyngeal erythema.   Eyes:      General: No scleral icterus.     Conjunctiva/sclera: Conjunctivae normal.      Pupils: Pupils are equal, round, and reactive to light.   Neck:      Musculoskeletal: Normal range of motion and neck supple.      Vascular: No JVD.   Cardiovascular:      Rate and Rhythm: Normal rate and regular rhythm.      Heart sounds: Normal heart sounds. No murmur. No friction rub. No gallop.    Pulmonary:      Effort: Pulmonary effort is normal. No respiratory distress.      Breath sounds: Normal breath sounds. No wheezing or rales.   Abdominal:      General: Bowel sounds are normal. There is no distension.      Palpations: Abdomen is soft.      Tenderness: There is no abdominal tenderness. There is no guarding or rebound.   Musculoskeletal: Normal range of motion.         General: No tenderness.        Feet:    Lymphadenopathy:      Cervical: No cervical adenopathy.   Skin:     General: Skin is warm and dry.      Capillary Refill: Capillary refill takes less than 2 seconds.       Coloration: Skin is not pale.      Findings: Erythema present. No rash.   Neurological:      Mental Status: He is alert and oriented to person, place, and time.      Cranial Nerves: No cranial nerve deficit.      Sensory: No sensory deficit.      Coordination: Coordination normal.      Deep Tendon Reflexes: Reflexes normal.   Psychiatric:         Behavior: Behavior normal.         Thought Content: Thought content normal.         Judgment: Judgment normal.           CRANIAL NERVES     CN III, IV, VI   Pupils are equal, round, and reactive to light.       Significant Labs:   Blood Culture: No results for input(s): LABBLOO in the last 48 hours.  CBC:   Recent Labs   Lab 09/04/20  1832   WBC 8.10   HGB 12.2*   HCT 39.5*        CMP:   Recent Labs   Lab 09/04/20  1833      K 4.8   CL 99   CO2 30*   *   BUN 13   CREATININE 1.0   CALCIUM 9.5   PROT 7.3   ALBUMIN 3.3*   BILITOT 0.3   ALKPHOS 80   AST 13   ALT 15   ANIONGAP 9   EGFRNONAA >60     Lactic Acid:   Recent Labs   Lab 09/04/20  1914   LACTATE 0.8       Significant Imaging: I have reviewed all pertinent imaging results/findings within the past 24 hours.

## 2020-09-05 NOTE — HOSPITAL COURSE
Garret Munguia is a 50-year-old male who presented to the emergency room for evaluation of wound infection to the right foot.  Patient states the wound initially began as a blister approximately 2 weeks ago.  He attempted to remove the blister himself.  He has been applying Neosporin and a Band-Aid to the wound.  However, the wound was continuing to worsen with a foul smelling drainage and surrounding erythema and edema present, and decided to report to the emergency room following recommendations.  [facility]  However, he stated he was not happy with the care he was receiving there as it was hard to get in to  appointments on time.  Therefore, he currently would like to begin establishing doctors [facility].  He currently works full time.  He states that he is on his feet often at his job.  He states that he is able to possibly work from home.  He will need to get approval from his job in order to do so.  If not, he is able to wear a Cam walker boot while working.   Patient has a history of peripheral neuropathy.  Awaiting lab results for hemoglobin A1c, but from speaking with patient it does appear that his diabetes is not well controlled.  A dietary consult is being placed in addition to diabetes Education.  Patient does appear to be determine to get better and get his blood sugar under control.  He is agreeable to recommendations.

## 2020-09-05 NOTE — RESPIRATORY THERAPY
09/04/20 1906   Patient Assessment/Suction   Level of Consciousness (AVPU) alert   Respiratory Effort Unlabored   PRE-TX-O2   O2 Device (Oxygen Therapy) room air   SpO2 98 %   Pulse Oximetry Type Intermittent   $ Pulse Oximetry - Multiple Charge Pulse Oximetry - Multiple

## 2020-09-05 NOTE — PLAN OF CARE
POC reviewed with patient and patient's family, understanding verbalized. AAOX4. Room air. Glucose monitoring/coverage ACHS as needed. Telemetry monitoring maintained. VS stable throughout shift. Safety maintained. Will continue to monitor.

## 2020-09-05 NOTE — ASSESSMENT & PLAN NOTE
Acute on chronic problem  Patient's FSGs are not controlled on current hypoglycemics.   Hemoglobin A1c pending  Most recent fingerstick glucose reviewed-   Recent Labs   Lab 09/05/20  0013 09/05/20  0604 09/05/20  1221   POCTGLUCOSE 293* 230* 257*     Current correctional scale  Medium  Increase anti-hyperglycemic dose as follows-   Antihyperglycemics (From admission, onward)    Start     Stop Route Frequency Ordered    09/05/20 2100  insulin detemir U-100 pen 10 Units      -- SubQ Nightly 09/05/20 1439    09/04/20 2345  insulin aspart U-100 pen 1-10 Units      -- SubQ Before meals & nightly PRN 09/04/20 2345        Hold Oral hypoglycemics while patient is in the hospital.  Discussed the importance of glycemic control.  Will need new orders for hypoglycemic agent upon discharge.  Consult Diabetic educator.  Will need glucose meter upon discharge.

## 2020-09-05 NOTE — ASSESSMENT & PLAN NOTE
Acute problem-S/P extensive debridement.  Will need wound VAC.  Vancomycin and Zosyn  Aggressive glucose control  MRI negative for osteomyelitis, however small joint effusion.  Elevated CRP.  Will likely need extended abx therapy.

## 2020-09-05 NOTE — PLAN OF CARE
Completed home wound vac order faxed to [facility] (ph#[phone], fax#[phone])....MARY Yeager     09/05/20 8712   Post-Acute Status   Post-Acute Authorization [facility] Status Referrals Sent

## 2020-09-05 NOTE — SUBJECTIVE & OBJECTIVE
Scheduled Meds:   piperacillin-tazobactam (ZOSYN) IVPB  4.5 g Intravenous Q8H    senna-docusate 8.6-50 mg  1 tablet Oral BID    vancomycin (VANCOCIN) IVPB  15 mg/kg Intravenous Q12H     Continuous Infusions:  PRN Meds:acetaminophen, acetaminophen, dextrose 50%, dextrose 50%, glucagon (human recombinant), glucose, glucose, HYDROcodone-acetaminophen, insulin aspart U-100, magnesium oxide, magnesium oxide, melatonin, ondansetron, potassium chloride, potassium chloride, sodium chloride 0.9%, Pharmacy to dose Vancomycin consult **AND** vancomycin - pharmacy to dose    Review of patient's allergies indicates:  No Known Allergies     Past Medical History:   Diagnosis Date    Diabetes mellitus, type 2      Past Surgical History:   Procedure Laterality Date    CERVICAL SPINE SURGERY         Family History     Problem Relation (Age of Onset)    Diabetes Brother, Maternal Grandmother        Tobacco Use    Smoking status: Never Smoker    Smokeless tobacco: Never Used   Substance and Sexual Activity    Alcohol use: Not Currently    Drug use: Never    Sexual activity: Yes     Partners: Female     Review of Systems  Objective:     Vital Signs (Most Recent):  Temp: 98.1 °F (36.7 °C) (09/05/20 0820)  Pulse: 84 (09/05/20 0820)  Resp: 18 (09/05/20 0820)  BP: 125/77 (09/05/20 0820)  SpO2: 97 % (09/05/20 0820) Vital Signs (24h Range):  Temp:  [96.7 °F (35.9 °C)-99.1 °F (37.3 °C)] 98.1 °F (36.7 °C)  Pulse:  [84-93] 84  Resp:  [16-20] 18  SpO2:  [95 %-99 %] 97 %  BP: (114-164)/(74-90) 125/77     Weight: 98.2 kg (216 lb 7.9 oz)  Body mass index is 30.19 kg/m².    Foot Exam       Pre debridement picture above.  Probes to joint capsule.  Necrotic and fibrotic tissue within wound in addition to a foul odor  Laboratory:  All pertinent labs reviewed within the last 24 hours.    Diagnostic Results:  I have reviewed all pertinent imaging results/findings within the past 24 hours.

## 2020-09-05 NOTE — PT/OT/SLP DISCHARGE
Physical Therapy Discharge    Name: Garret Munguia  MRN: [id number]   Principal Problem: Foot ulceration, right, with unspecified severity     Patient Discharged from acute Physical Therapy on 9/05/2020.       Assessment:     Functioning independently, no significant deficits noted.    Objective:     GOALS:   Multidisciplinary Problems     Physical Therapy Goals     Not on file                Reasons for Discontinuation of Therapy Services  No functional deficits noted.      Plan:     Patient Discharged to: Home no PT services needed.    Jamir Isaac, PT  9/5/2020

## 2020-09-05 NOTE — ASSESSMENT & PLAN NOTE
Acute on chronic problem  Patient's FSGs are not controlled on current hypoglycemics.   Last A1c reviewed- No results found for: LABA1C, HGBA1C  Most recent fingerstick glucose reviewed- No results for input(s): POCTGLUCOSE in the last 24 hours.  Current correctional scale  Medium  Maintain anti-hyperglycemic dose as follows-   Antihyperglycemics (From admission, onward)    None        Hold Oral hypoglycemics while patient is in the hospital.

## 2020-09-05 NOTE — ASSESSMENT & PLAN NOTE
Acute problem  Vancomycin and Zosyn  Consult podiatry  Consult wound care  Aggressive glucose control  X-ray negative for obvious osteomyelitis

## 2020-09-05 NOTE — PLAN OF CARE
JESSICA completed assessment w/ pt.  Pt is a 10 year [employer] , lives w/ his sig other, Keri Riggs.  Pt arrived from the [facility] clinic, denies use of home health or DME.  He reports he needs a new glucometer as the one he had is old and is not working properly.  Pt denies a healthcare POA and living will. [facility] [place] but reports he is not satisfied with the clinic as it takes a long time to schedule an appointment.  Pt reports he may be here until Wednesday.  JESSICA informed him the process for obtaining a wound vac for home has already been started.  Pt may likely d/c home w/ home health.       09/05/20 1632   Discharge Assessment   Assessment Type Discharge Planning Assessment   Confirmed/corrected address and phone number on facesheet? Yes   Assessment information obtained from? Patient   Communicated expected length of stay with patient/caregiver yes   Prior to hospitilization cognitive status: Alert/Oriented   Prior to hospitalization functional status: Independent   Current cognitive status: Alert/Oriented   Current Functional Status: Independent   Facility Arrived From: home/[facility]   Lives With significant other   Able to Return to Prior Arrangements yes   Is patient able to care for self after discharge? Yes   Who are your caregiver(s) and their phone number(s)? significant other:  Keri Riggs [phone]   Patient's perception of discharge disposition home health   Readmission Within the Last 30 Days no previous admission in last 30 days   Patient currently being followed by outpatient case management? No   Patient currently receives any other outside agency services? No   Equipment Currently Used at Home none   Part D Coverage N/A   Do you have any problems affording any of your prescribed medications? No   Is the patient taking medications as prescribed? yes   Does the patient have transportation home? Yes   Transportation Anticipated family or friend  will provide   Dialysis Name and Scheduled days N/A   Does the patient receive services at the [facility]? No   Discharge Plan A Home Health   Discharge Plan B Home Health;Home with family   DME Needed Upon Discharge  glucometer;negative pressure wound therapy device   Patient/Family in Agreement with Plan yes

## 2020-09-05 NOTE — PROGRESS NOTES
[facility] Hospital Medicine  Progress Note    Patient Name: Garret Munguia  MRN: [id number]  Patient Class: IP- Inpatient   Admission Date: 9/4/2020  Length of Stay: 0 days  Attending Physician: Anjel Bernstein MD  Primary Care Provider: Primary Doctor No        Subjective:     Principal Problem:Diabetic infection of right foot        HPI:  Garret Munguia is a 50-year-old male who presents to the emergency room for evaluation of wound infection to the right foot.  He reports his wound onset approximately 1-2 weeks ago.  He denies any known injury or trauma.  He denies fever chills.  No known sick contacts or travel.  He describes minimal pain to the right foot wound.  No aggravating alleviating factors.  Previous medical history includes type 2 diabetes.  ER workup:  CBC with mild anemia.  CMP with glucose of 287.  X-ray of the right foot was negative for osteomyelitis.  Wound and blood cultures are pending.  Lactic acid was within normal limits.  Patient was given vancomycin and Zosyn in the ER.  Patient will be admitted to Hospital Medicine for observation and management.  Will consult podiatry and wound care.    Overview/Hospital Course:  No notes on file    Interval History:  Seen and examined.  Pt denies any pain to right foot.  Denies any chest pain or SOB.  No nausea or vomiting.  Discussed with podiatry-MRI ordered and completed with MTP joint effusion noted associated with elevated CRP-podiatrist concern for septic joint.  Pt is S/P debridement-very neuropathic with no pain during extensive debridement.  Will need wound VAC.    Review of Systems   Constitutional: Negative for chills and fever.   HENT: Negative for trouble swallowing.    Respiratory: Negative for cough and shortness of breath.    Cardiovascular: Negative for chest pain.   Gastrointestinal: Negative for nausea and vomiting.   Genitourinary: Negative for dysuria.   Musculoskeletal: Positive for joint swelling. Negative for  arthralgias and myalgias.   Skin: Positive for wound.   Neurological: Negative for weakness and headaches.   Psychiatric/Behavioral: Negative for confusion.     Objective:     Vital Signs (Most Recent):  Temp: 98.1 °F (36.7 °C) (09/05/20 0820)  Pulse: 84 (09/05/20 0820)  Resp: 18 (09/05/20 0820)  BP: 125/77 (09/05/20 0820)  SpO2: 97 % (09/05/20 0820) Vital Signs (24h Range):  Temp:  [96.7 °F (35.9 °C)-99.1 °F (37.3 °C)] 98.1 °F (36.7 °C)  Pulse:  [84-93] 84  Resp:  [16-20] 18  SpO2:  [95 %-99 %] 97 %  BP: (114-164)/(74-90) 125/77     Weight: 98.2 kg (216 lb 7.9 oz)  Body mass index is 30.19 kg/m².  No intake or output data in the 24 hours ending 09/05/20 1412   Physical Exam  Vitals signs and nursing note reviewed.   Constitutional:       Appearance: Normal appearance. He is normal weight.   HENT:      Head: Normocephalic and atraumatic.      Mouth/Throat:      Mouth: Mucous membranes are moist.      Pharynx: Oropharynx is clear.   Eyes:      Extraocular Movements: Extraocular movements intact.      Conjunctiva/sclera: Conjunctivae normal.      Pupils: Pupils are equal, round, and reactive to light.   Neck:      Musculoskeletal: Normal range of motion and neck supple.   Cardiovascular:      Rate and Rhythm: Normal rate and regular rhythm.      Pulses: Normal pulses.      Heart sounds: Normal heart sounds.   Pulmonary:      Effort: Pulmonary effort is normal.      Breath sounds: Normal breath sounds.   Abdominal:      General: Abdomen is flat. Bowel sounds are normal.      Palpations: Abdomen is soft.   Musculoskeletal: Normal range of motion.   Skin:     General: Skin is warm.      Capillary Refill: Capillary refill takes less than 2 seconds.      Comments: Wound noted to right MTP joint with white slough, boggy to touch.  With mild surrounding erythema.   Neurological:      Mental Status: He is alert.             Significant Labs:   CBC:   Recent Labs   Lab 09/04/20  1832 09/05/20  0605   WBC 8.10 8.31   HGB 12.2*  11.5*   HCT 39.5* 37.8*    323     CMP:   Recent Labs   Lab 09/04/20  1833 09/05/20  0605    139   K 4.8 4.2   CL 99 101   CO2 30* 29   * 250*   BUN 13 10   CREATININE 1.0 0.9   CALCIUM 9.5 8.9   PROT 7.3 6.7   ALBUMIN 3.3* 2.9*   BILITOT 0.3 0.5   ALKPHOS 80 69   AST 13 11   ALT 15 11   ANIONGAP 9 9   EGFRNONAA >60 >60       Significant Imaging: I have reviewed all pertinent imaging results/findings within the past 24 hours.      Assessment/Plan:      * Diabetic infection of right foot  Continue IV vanc, Zosyn.  Podiatrist consulted.  S/P extensive debridement.  Plan for wound VAC.  Follow culture.  Consult with Infectious Disease-appreciate recommendations.  Need tighter glycemic control, discussed with Pt the importance of diabetic care compliance.      Foot ulceration, right, with unspecified severity  Acute problem-S/P extensive debridement.  Will need wound VAC.  Vancomycin and Zosyn  Aggressive glucose control  MRI negative for osteomyelitis, however small joint effusion.  Elevated CRP.  Will likely need extended abx therapy.        Type 2 diabetes mellitus with foot ulcer, without long-term current use of insulin  Acute on chronic problem  Patient's FSGs are not controlled on current hypoglycemics.   Hemoglobin A1c pending  Most recent fingerstick glucose reviewed-   Recent Labs   Lab 09/05/20  0013 09/05/20  0604 09/05/20  1221   POCTGLUCOSE 293* 230* 257*     Current correctional scale  Medium  Increase anti-hyperglycemic dose as follows-   Antihyperglycemics (From admission, onward)    Start     Stop Route Frequency Ordered    09/05/20 2100  insulin detemir U-100 pen 10 Units      -- SubQ Nightly 09/05/20 1439    09/04/20 2345  insulin aspart U-100 pen 1-10 Units      -- SubQ Before meals & nightly PRN 09/04/20 2345        Hold Oral hypoglycemics while patient is in the hospital.  Discussed the importance of glycemic control.  Will need new orders for hypoglycemic agent upon  discharge.  Consult Diabetic educator.  Will need glucose meter upon discharge.      VTE Risk Mitigation (From admission, onward)         Ordered     IP VTE LOW RISK PATIENT  Once      09/04/20 2345     Place sequential compression device  Until discontinued      09/04/20 2345     Place CHRISTOPHER hose  Until discontinued      09/04/20 2345                Discharge Planning   MELISA:  9/7/20    Code Status: Full Code   Is the patient medically ready for discharge?:     Reason for patient still in hospital (select all that apply): Laboratory test, Treatment and Consult recommendations                     Cathy Menjivar NP  Department of Hospital Medicine   [facility]

## 2020-09-05 NOTE — H&P
[facility] Hospital Medicine  History & Physical    Patient Name: Garret Munguia  MRN: [id number]  Admission Date: 9/4/2020  Attending Physician: Sherine Kaplan MD   Primary Care Provider: Primary Doctor No         Patient information was obtained from patient, past medical records and ER records.     Subjective:     Principal Problem:Foot ulceration, right, with unspecified severity    Chief Complaint:   Chief Complaint   Patient presents with    Wound Check     right great toe, there since 8/16    Hyperglycemia        HPI: Garret Munguia is a 50-year-old male who presents to the emergency room for evaluation of wound infection to the right foot.  He reports his wound onset approximately 1-2 weeks ago.  He denies any known injury or trauma.  He denies fever chills.  No known sick contacts or travel.  He describes minimal pain to the right foot wound.  No aggravating alleviating factors.  Previous medical history includes type 2 diabetes.  ER workup:  CBC with mild anemia.  CMP with glucose of 287.  X-ray of the right foot was negative for osteomyelitis.  Wound and blood cultures are pending.  Lactic acid was within normal limits.  Patient was given vancomycin and Zosyn in the ER.  Patient will be admitted to Hospital Medicine for observation and management.  Will consult podiatry and wound care.    Past Medical History:   Diagnosis Date    Diabetes mellitus, type 2        Past Surgical History:   Procedure Laterality Date    CERVICAL SPINE SURGERY         Review of patient's allergies indicates:  No Known Allergies    No current facility-administered medications on file prior to encounter.      Current Outpatient Medications on File Prior to Encounter   Medication Sig    cetirizine (ZYRTEC) 10 MG tablet Take 1 tablet (10 mg total) by mouth once daily. (Patient not taking: Reported on 9/4/2020)    fluticasone propionate (FLONASE) 50 mcg/actuation nasal spray 2 sprays (100 mcg total) by Each Nostril  route once daily. (Patient not taking: Reported on 9/4/2020)    metFORMIN (GLUCOPHAGE) 500 MG tablet Take 500 mg by mouth 2 (two) times daily with meals.     Family History     None        Tobacco Use    Smoking status: Never Smoker   Substance and Sexual Activity    Alcohol use: Not on file    Drug use: Not on file    Sexual activity: Not on file     Review of Systems   Constitutional: Positive for activity change. Negative for chills, diaphoresis and fever.   HENT: Negative for congestion, nosebleeds and tinnitus.    Eyes: Negative for photophobia and visual disturbance.   Respiratory: Negative for cough, chest tightness, shortness of breath and wheezing.    Cardiovascular: Negative for chest pain, palpitations and leg swelling.   Gastrointestinal: Negative for abdominal distention, abdominal pain, constipation, diarrhea, nausea and vomiting.   Endocrine: Negative for cold intolerance and heat intolerance.   Genitourinary: Negative for difficulty urinating, dysuria, frequency, hematuria and urgency.   Musculoskeletal: Positive for arthralgias. Negative for back pain and myalgias.   Skin: Positive for color change and wound. Negative for pallor and rash.   Allergic/Immunologic: Negative for immunocompromised state.   Neurological: Negative for dizziness, tremors, facial asymmetry, speech difficulty and weakness.   Hematological: Negative for adenopathy. Does not bruise/bleed easily.   Psychiatric/Behavioral: Negative for confusion and sleep disturbance. The patient is not nervous/anxious.      Objective:     Vital Signs (Most Recent):  Temp: 98.6 °F (37 °C) (09/04/20 1810)  Pulse: 89 (09/04/20 2255)  Resp: 18 (09/04/20 1810)  BP: (!) 164/83 (09/04/20 2144)  SpO2: 96 % (09/04/20 2255) Vital Signs (24h Range):  Temp:  [98.6 °F (37 °C)-99.1 °F (37.3 °C)] 98.6 °F (37 °C)  Pulse:  [89-91] 89  Resp:  [16-18] 18  SpO2:  [96 %-98 %] 96 %  BP: (114-164)/(74-90) 164/83     Weight: 97.1 kg (214 lb)  Body mass index is  29.85 kg/m².    Physical Exam  Vitals signs and nursing note reviewed.   Constitutional:       General: He is not in acute distress.     Appearance: He is well-developed. He is not diaphoretic.   HENT:      Head: Normocephalic.      Mouth/Throat:      Pharynx: Oropharynx is clear. No oropharyngeal exudate or posterior oropharyngeal erythema.   Eyes:      General: No scleral icterus.     Conjunctiva/sclera: Conjunctivae normal.      Pupils: Pupils are equal, round, and reactive to light.   Neck:      Musculoskeletal: Normal range of motion and neck supple.      Vascular: No JVD.   Cardiovascular:      Rate and Rhythm: Normal rate and regular rhythm.      Heart sounds: Normal heart sounds. No murmur. No friction rub. No gallop.    Pulmonary:      Effort: Pulmonary effort is normal. No respiratory distress.      Breath sounds: Normal breath sounds. No wheezing or rales.   Abdominal:      General: Bowel sounds are normal. There is no distension.      Palpations: Abdomen is soft.      Tenderness: There is no abdominal tenderness. There is no guarding or rebound.   Musculoskeletal: Normal range of motion.         General: No tenderness.        Feet:    Lymphadenopathy:      Cervical: No cervical adenopathy.   Skin:     General: Skin is warm and dry.      Capillary Refill: Capillary refill takes less than 2 seconds.      Coloration: Skin is not pale.      Findings: Erythema present. No rash.   Neurological:      Mental Status: He is alert and oriented to person, place, and time.      Cranial Nerves: No cranial nerve deficit.      Sensory: No sensory deficit.      Coordination: Coordination normal.      Deep Tendon Reflexes: Reflexes normal.   Psychiatric:         Behavior: Behavior normal.         Thought Content: Thought content normal.         Judgment: Judgment normal.           CRANIAL NERVES     CN III, IV, VI   Pupils are equal, round, and reactive to light.       Significant Labs:   Blood Culture: No results for  input(s): LABBLOO in the last 48 hours.  CBC:   Recent Labs   Lab 09/04/20  1832   WBC 8.10   HGB 12.2*   HCT 39.5*        CMP:   Recent Labs   Lab 09/04/20  1833      K 4.8   CL 99   CO2 30*   *   BUN 13   CREATININE 1.0   CALCIUM 9.5   PROT 7.3   ALBUMIN 3.3*   BILITOT 0.3   ALKPHOS 80   AST 13   ALT 15   ANIONGAP 9   EGFRNONAA >60     Lactic Acid:   Recent Labs   Lab 09/04/20  1914   LACTATE 0.8       Significant Imaging: I have reviewed all pertinent imaging results/findings within the past 24 hours.    Assessment/Plan:     * Foot ulceration, right, with unspecified severity  Acute problem  Vancomycin and Zosyn  Consult podiatry  Consult wound care  Aggressive glucose control  X-ray negative for obvious osteomyelitis        Type 2 diabetes mellitus  Acute on chronic problem  Patient's FSGs are not controlled on current hypoglycemics.   Last A1c reviewed- No results found for: LABA1C, HGBA1C  Most recent fingerstick glucose reviewed- No results for input(s): POCTGLUCOSE in the last 24 hours.  Current correctional scale  Medium  Maintain anti-hyperglycemic dose as follows-   Antihyperglycemics (From admission, onward)    None        Hold Oral hypoglycemics while patient is in the hospital.        VTE Risk Mitigation (From admission, onward)    None             Jack Rock NP  Department of Hospital Medicine   [facility]   Unknown if ever smoked

## 2020-09-05 NOTE — CARE UPDATE
09/05/20 0746   Patient Assessment/Suction   Level of Consciousness (AVPU) alert   PRE-TX-O2   O2 Device (Oxygen Therapy) room air   SpO2 97 %   Pulse Oximetry Type Intermittent   $ Pulse Oximetry - Multiple Charge Pulse Oximetry - Multiple   Pulse 91   Resp 18

## 2020-09-05 NOTE — CONSULTS
[facility]  Podiatry  Consult Note    Patient Name: Garret Munguia  MRN: [id number]  Admission Date: 9/4/2020  Hospital Length of Stay: 0 days  Attending Physician: Anjel Bernstein MD  Primary Care Provider: Primary Doctor No     Consults  Subjective:     History of Present Illness:  No notes on file    Scheduled Meds:   piperacillin-tazobactam (ZOSYN) IVPB  4.5 g Intravenous Q8H    senna-docusate 8.6-50 mg  1 tablet Oral BID    vancomycin (VANCOCIN) IVPB  15 mg/kg Intravenous Q12H     Continuous Infusions:  PRN Meds:acetaminophen, acetaminophen, dextrose 50%, dextrose 50%, glucagon (human recombinant), glucose, glucose, HYDROcodone-acetaminophen, insulin aspart U-100, magnesium oxide, magnesium oxide, melatonin, ondansetron, potassium chloride, potassium chloride, sodium chloride 0.9%, Pharmacy to dose Vancomycin consult **AND** vancomycin - pharmacy to dose    Review of patient's allergies indicates:  No Known Allergies     Past Medical History:   Diagnosis Date    Diabetes mellitus, type 2      Past Surgical History:   Procedure Laterality Date    CERVICAL SPINE SURGERY         Family History     Problem Relation (Age of Onset)    Diabetes Brother, Maternal Grandmother        Tobacco Use    Smoking status: Never Smoker    Smokeless tobacco: Never Used   Substance and Sexual Activity    Alcohol use: Not Currently    Drug use: Never    Sexual activity: Yes     Partners: Female     Review of Systems  Objective:     Vital Signs (Most Recent):  Temp: 98.1 °F (36.7 °C) (09/05/20 0820)  Pulse: 84 (09/05/20 0820)  Resp: 18 (09/05/20 0820)  BP: 125/77 (09/05/20 0820)  SpO2: 97 % (09/05/20 0820) Vital Signs (24h Range):  Temp:  [96.7 °F (35.9 °C)-99.1 °F (37.3 °C)] 98.1 °F (36.7 °C)  Pulse:  [84-93] 84  Resp:  [16-20] 18  SpO2:  [95 %-99 %] 97 %  BP: (114-164)/(74-90) 125/77     Weight: 98.2 kg (216 lb 7.9 oz)  Body mass index is 30.19 kg/m².    Foot Exam       Pre debridement picture above.   Probes to joint capsule.  Necrotic and fibrotic tissue within wound in addition to a foul odor  Laboratory:  All pertinent labs reviewed within the last 24 hours.    Diagnostic Results:  I have reviewed all pertinent imaging results/findings within the past 24 hours.      Assessment/Plan:     * Foot ulceration, right, with unspecified severity  Dietary consult in addition to diabetes Education consult    Ordered Canelo for patient to take 3 times daily for optimal nutrition management    Wound VAC ordered.  Patient to have wound VAC dressing changes the  home health once he is discharged- twice weekly.  Patient will follow up in outpatient wound clinic with myself once weekly    Cam boot ordered for patient to wear at all times.  Minimal weight-bearing to right lower extremity.      Bathroom privileges only with wearing Cam boot.  Do not get wound or loop foot wet    Right plantar foot wound: Full-thickness excisional debridement to fascia tissue was performed at bedside utilizing a # 15 blade and a curette.  Necrotic tissue, biofilm, slough, and callus was debrided from the wound base.  The wound was copiously irrigated with sterile saline.  No abscesses were encountered.  Post wound measurements were 3 cm x 2.5 cm by 1 cm deep Wound extends to for 1st MPJ capsule.    Patient tolerated the procedure well        Thank you for your consult. I will follow-up with patient. Please contact us if you have any additional questions.    Bianca Medrano DPM  Podiatry  [facility]

## 2020-09-05 NOTE — ED PROVIDER NOTES
Encounter Date: 9/4/2020       History     Chief Complaint   Patient presents with    Wound Check     right great toe, there since 8/16    Hyperglycemia     Pt is a 49 yo male presenting with complaints of right lower leg swelling for the past 24 hours. He states he has had an ulcer on his right great toe that has been non-healing for almost 6 weeks and ednorses recent worsening drainage. He reports he does not have much feeling in his feet and lower legs and denies fever, past history of DVT/PE/CHF. He reports worsening malaise over the past 24 hours. He denies taking antibiotics within the past 2 months and has been doctoring and wrapping the wound at home daily with neosporin. He additional denies CP, SOB, abdominal pain, past history of diabetic foot ulcers. He reports there are also slow healing scabs of the right front shin from scraping on a trailer weeks ago.         Review of patient's allergies indicates:  No Known Allergies  Past Medical History:   Diagnosis Date    Diabetes mellitus, type 2      Past Surgical History:   Procedure Laterality Date    CERVICAL SPINE SURGERY       Family History   Problem Relation Age of Onset    Diabetes Brother     Diabetes Maternal Grandmother      Social History     Tobacco Use    Smoking status: Never Smoker    Smokeless tobacco: Never Used   Substance Use Topics    Alcohol use: Not Currently    Drug use: Never     Review of Systems   Constitutional: Negative for fever.   HENT: Negative for congestion.    Respiratory: Negative for shortness of breath.    Gastrointestinal: Negative for abdominal pain.   Genitourinary: Negative for dysuria.   Musculoskeletal: Positive for joint swelling.   Skin: Positive for rash.   Neurological: Negative for syncope.   Psychiatric/Behavioral: Negative for confusion.       Physical Exam     Initial Vitals [09/04/20 1810]   BP Pulse Resp Temp SpO2   (!) 141/90 90 18 98.6 °F (37 °C) 98 %      MAP       --         Physical  Exam    Nursing note and vitals reviewed.  Constitutional: He appears well-nourished. No distress.   HENT:   Head: Normocephalic and atraumatic.   Eyes: Conjunctivae are normal.   Neck: Normal range of motion. Neck supple.   Cardiovascular: Normal rate and regular rhythm.   Pulmonary/Chest: Breath sounds normal. No respiratory distress. He has no wheezes.   Abdominal: Bowel sounds are normal. He exhibits no distension. There is no abdominal tenderness.   Musculoskeletal: Normal range of motion. Edema present.      Comments: RLE swelling with warmth and mild redness below the knee, wet appearing right great toe ulceration   Neurological: He is alert. He has normal strength. A sensory deficit is present. GCS score is 15. GCS eye subscore is 4. GCS verbal subscore is 5. GCS motor subscore is 6.   Chronic BL LE sensory deficit   Skin: Skin is warm and dry. Capillary refill takes less than 2 seconds.   Psychiatric: He has a normal mood and affect. Thought content normal.         ED Course   Procedures  Labs Reviewed   COMPREHENSIVE METABOLIC PANEL - Abnormal; Notable for the following components:       Result Value    CO2 30 (*)     Glucose 287 (*)     Albumin 3.3 (*)     All other components within normal limits   CBC W/ AUTO DIFFERENTIAL - Abnormal; Notable for the following components:    RBC 4.45 (*)     Hemoglobin 12.2 (*)     Hematocrit 39.5 (*)     Mean Corpuscular Hemoglobin Conc 30.9 (*)     RDW 11.2 (*)     MPV 8.5 (*)     All other components within normal limits   BETA - HYDROXYBUTYRATE, SERUM   LACTIC ACID, PLASMA   SARS-COV-2 RNA AMPLIFICATION, QUAL          Imaging Results          US Lower Extremity Veins Right (Final result)  Result time 09/04/20 20:53:03    Final result by Reyna Arevalo MD (09/04/20 20:53:03)                 Impression:      No evidence of deep venous thrombosis in the right lower extremity.      Electronically signed by: Reyna Arevalo  Date:    09/04/2020  Time:    20:53              Narrative:    EXAMINATION:  US LOWER EXTREMITY VEINS RIGHT    CLINICAL HISTORY:  Other specified soft tissue disorders    TECHNIQUE:  Duplex and color flow Doppler evaluation and graded compression of the right lower extremity veins was performed.    COMPARISON:  None    FINDINGS:  Right thigh veins: The common femoral, femoral, popliteal, upper greater saphenous, and deep femoral veins are patent and free of thrombus. The veins are normally compressible and have normal phasic flow and augmentation response.    Right calf veins: The visualized calf veins are patent.    Contralateral CFV: The contralateral (left) common femoral vein is patent and free of thrombus.    Miscellaneous: There is no appreciable popliteal cyst on the right.  The                               X-Ray Foot Complete Right (Final result)  Result time 09/04/20 19:19:08    Final result by Reyna Arevalo MD (09/04/20 19:19:08)                 Impression:      No evidence of erosive changes or focal osseous findings to suggest osteomyelitis in this patient with soft tissue area of ulceration.    Degenerative changes as described.      Electronically signed by: Reyna Arevalo  Date:    09/04/2020  Time:    19:19             Narrative:    EXAMINATION:  XR FOOT COMPLETE 3 VIEW RIGHT    CLINICAL HISTORY:  . Non-pressure chronic ulcer of other part of right foot with unspecified severity    TECHNIQUE:  AP, lateral, and oblique views of the right foot were performed.    COMPARISON:  None    FINDINGS:  There is no erosion or focal area of sclerosis involving the imaged osseous structures of the right foot.  Joint spaces appear well maintained.  Degenerative changes are noted involving the great toe MTP joint with minor joint narrowing and osteophyte formation.  Bony mineralization is intact.  There are no foreign bodies which are opaque in the imaged soft tissues and there is no appreciable soft tissue gas to suggest abscess.  Calcaneal dorsal  and plantar enthesophytes are incidentally noted.                                 Medical Decision Making:   ED Management:  Pt was interviewed and examined emergently. VSS. Concern for osteo with worsening RLE cellulitis, considering rapid onset of leg swelling. RLE US neg for DVT. Labs largely unremarkable. BG at home are increasing and difficult to control. I suspect this is 2/2 progressing foot infection with cellulitis. Pt initiated on BSA. He does warrant observation for further therapy, wound and podiatry consult, BG management. Case d/w and accepted by on call NP. Pt and wife updated on the POC and they are in agreement. He will be transferred to an obs bed in guarded condition.                                 Clinical Impression:       ICD-10-CM ICD-9-CM   1. Foot ulceration, right, with unspecified severity  L97.519 707.15   2. Right leg swelling  M79.89 729.81         Disposition:   Disposition: Placed in Observation  Condition: Fair     ED Disposition Condition    Observation                           Wicho Mojica MD  09/05/20 1200

## 2020-09-05 NOTE — PLAN OF CARE
Pt resting in bed with eyes closed. Pt alert and oriented. Able to make needs and wants known. No complaints or distress noted at this time. Pt ambulatory.  Bed low, call light in reach, free of falls, safety maintained, VSS, will continue to monitor.

## 2020-09-05 NOTE — NURSING
Pt arrived to the floor via wheelchair. Pt alert and oriented able to me needs and wants known No complaints ort distress noted at this time. Pt ambulated from wheelchair to bed with no shifts in gait. Bed low, call light in reach, safety maintained will continue to monitor

## 2020-09-05 NOTE — ASSESSMENT & PLAN NOTE
Dietary consult in addition to diabetes Education consult    Ordered Canelo for patient to take 3 times daily for optimal nutrition management    Wound VAC ordered.  Patient to have wound VAC dressing changes the a home health once he is discharged- twice weekly.  Patient will follow up in outpatient wound clinic with myself once weekly    Cam boot ordered for patient to wear at all times.  Minimal weight-bearing to right lower extremity.      Bathroom privileges only with wearing Cam boot.  Do not get wound or loop foot wet    Right plantar foot wound: Full-thickness excisional debridement to fascia tissue was performed at bedside utilizing a # 15 blade and a curette.  Necrotic tissue, biofilm, slough, and callus was debrided from the wound base.  The wound was copiously irrigated with sterile saline.  No abscesses were encountered.  Post wound measurements were 3 cm x 2.5 cm by 1 cm deep Wound extends to for 1st MPJ capsule.    Patient tolerated the procedure well

## 2020-09-05 NOTE — HPI
Garret Munguia is a 50-year-old male who presents to the emergency room for evaluation of wound infection to the right foot.  He reports his wound onset approximately 1-2 weeks ago.  He denies any known injury or trauma.  He denies fever chills.  No known sick contacts or travel.  He describes minimal pain to the right foot wound.  No aggravating alleviating factors.  Previous medical history includes type 2 diabetes.  ER workup:  CBC with mild anemia.  CMP with glucose of 287.  X-ray of the right foot was negative for osteomyelitis.  Wound and blood cultures are pending.  Lactic acid was within normal limits.  Patient was given vancomycin and Zosyn in the ER.  Patient will be admitted to Hospital Medicine for observation and management.  Will consult podiatry and wound care.

## 2020-09-05 NOTE — NURSING
Spoke to Margarette at [facility] regarding consult. Primary nurse MARY Gallardo notified.

## 2020-09-05 NOTE — CONSULTS
Consult Note  Infectious Disease    Reason for Consult:  Diabetic foot infection    HPI: Garret Munguia is a   50 y.o. male with uncontrolled diabetes, presented to the emergency room last night for wound infection right foot.  He has peripheral neuropathy and did not recognize any injury.  The blister had been present for 2 and half weeks.  He had been cleaning with peroxide and applying Neosporin.  Plain film was negative for osteomyelitis, wound and blood cultures were obtained, vancomycin and Zosyn were begun in the emergency room.  An MRI was performed which did not show any osteomyelitis but did show a very small effusion in the MTP joint.  He was seen by Dr. Bianca Medrano and she debrided the blister tissue and subcutaneous tissue down to the fascia  and the joint capsule.  The wound had a foul odor but no abscess was encountered.  A wound VAC was ordered.    Review of patient's allergies indicates:  No Known Allergies  Past Medical History:   Diagnosis Date    Diabetes mellitus, type 2     Obesity  Past Surgical History:   Procedure Laterality Date    CERVICAL SPINE SURGERY       Social History     Socioeconomic History    Marital status: Significant Other     Spouse name: Not on file    Number of children: Not on file    Years of education: Not on file    Highest education level: Not on file   Occupational History    Not on file   Social Needs    Financial resource strain: Not on file    Food insecurity     Worry: Not on file     Inability: Not on file    Transportation needs     Medical: Not on file     Non-medical: Not on file   Tobacco Use    Smoking status: Never Smoker    Smokeless tobacco: Never Used   Substance and Sexual Activity    Alcohol use: Not Currently    Drug use: Never    Sexual activity: Yes     Partners: Female   Lifestyle    Physical activity     Days per week: Not on file     Minutes per session: Not on file    Stress: Not on file   Relationships    Social connections      Talks on phone: Not on file     Gets together: Not on file     Attends Judaism service: Not on file     Active member of club or organization: Not on file     Attends meetings of clubs or organizations: Not on file     Relationship status: Not on file   Other Topics Concern    Not on file   Social History Narrative    Not on file     Family History   Problem Relation Age of Onset    Diabetes Brother     Diabetes Maternal Grandmother        Pertinent medications noted:     Review of Systems:  He had no fever, chills, sweats  He does not see of medical provider regularly and [facility]  His metformin had run out.  He is not in the habit of checking his sugars or examining his feet daily.  He does have peripheral neuropathy    EXAM & DIAGNOSTICS REVIEWED:   Vitals:     Temp:  [96.7 °F (35.9 °C)-99.1 °F (37.3 °C)]   Temp: 98.1 °F (36.7 °C) (09/05/20 0820)  Pulse: 84 (09/05/20 0820)  Resp: 18 (09/05/20 0820)  BP: 125/77 (09/05/20 0820)  SpO2: 97 % (09/05/20 0820)  No intake or output data in the 24 hours ending 09/05/20 1436    General:  In NAD. Alert and attentive, cooperative, comfortable     Musc:  Excluding right foot Joints without effusion, swelling, erythema, synovitis, muscle wasting.   Skin:  No rashes. No palmar or plantar lesions. No subungual petechiae  Wound:    pre debridement    I did review a photograph of his foot post debridement.  And it was packed with Nu Gauze.  I did not disturb the fresh post debridement bandage.  He reports that the debridement did not cause him any pain.    Neuro:              Alert, attentive, speech fluent, face symmetric, moves all extremities, no focal weakness. Ambulatory  Psych:  Calm, cooperative  Lymphatic:        Extrem: No edema, erythema, phlebitis, cellulitis, warm and well perfused  VAD:       Isolation:  None    Lines/Tubes/Drains:    General Labs reviewed:  Recent Labs   Lab 09/04/20  1832 09/05/20  0605   WBC 8.10 8.31   HGB  12.2* 11.5*   HCT 39.5* 37.8*    323       Recent Labs   Lab 09/04/20  1833 09/05/20  0605    139   K 4.8 4.2   CL 99 101   CO2 30* 29   BUN 13 10   CREATININE 1.0 0.9   CALCIUM 9.5 8.9   PROT 7.3 6.7   BILITOT 0.3 0.5   ALKPHOS 80 69   ALT 15 11   AST 13 11           Micro:  Microbiology Results (last 7 days)     Procedure Component Value Units Date/Time    Blood culture x two cultures. Draw prior to antibiotics. [[id number]] Collected: 09/04/20 1914    Order Status: Completed Specimen: Blood from Antecubital, Right Arm Updated: 09/05/20 0915     Blood Culture, Routine No Growth to date    Narrative:      Aerobic and anaerobic    Blood culture x two cultures. Draw prior to antibiotics. [[id number]] Collected: 09/04/20 1914    Order Status: Completed Specimen: Blood from Antecubital, Left Arm Updated: 09/05/20 0915     Blood Culture, Routine No Growth to date    Narrative:      Aerobic and anaerobic    Aerobic culture (Specify Source) **CANNOT BE ORDERED AS STAT** [[id number]] Collected: 09/04/20 1924    Order Status: Sent Specimen: Wound from Foot, Right Updated: 09/05/20 0116    Aerobic culture [[id number]]     Order Status: Canceled Specimen: Wound from Foot, Right         Imaging Reviewed:   X-ray of the ft   MRI of the forefoot  1. Small nonhealing diabetic ulcer within the subcutaneous tissues adjacent to the 1st MTP joint with associated cellulitis.  2. No MRI evidence to suggest an underlying focus of osteomyelitis.  3. Small effusion of the 1st MTP joint.     Cardiology:    IMPRESSION & PLAN   1. Diabetic foot infection, requiring full-thickness debridement, joint capsule exposed, negative MRI for osteomyelitis  2. Diabetes with hyperglycemia, inadequate knowledge base and habits for good outcomes but motivated to learn  3. Diabetes with polyneuropathy    Last tetanus unknown      Recommendations:  Continue current antibiotics, will follow up on cultures  Will construct a regimen that is effective  in convenient    tD AP vaccine has been ordered    Medical Decision Making during this encounter was  [_] Low Complexity  [x_] Moderate Complexity  [  ] High Complexity

## 2020-09-05 NOTE — CONSULTS
Pharmacokinetic Initial Assessment: IV Vancomycin    Assessment/Plan:    Initiated intravenous vancomycin with ED dose of 2250 mg once followed by a maintenance dose of vancomycin 1500mg IV every 12 hours  Desired empiric serum trough concentration is 10 to 15 mcg/mL  Draw vancomycin trough level 60 min prior to fourth dose on 9/6 at approximately 0900  Pharmacy will continue to follow and monitor vancomycin.      Please contact pharmacy at extension [phone] with any questions regarding this assessment.     Thank you for the consult,   Roberta Carrero       Patient brief summary:  Garret Munguia is a 50 y.o. male initiated on antimicrobial therapy with IV Vancomycin for treatment of suspected skin & soft tissue infection    Drug Allergies:   Review of patient's allergies indicates:  No Known Allergies    Actual Body Weight:   98.2kg    Renal Function:   Estimated Creatinine Clearance: 105.6 mL/min (based on SCr of 1 mg/dL).,         CBC (last 72 hours):  Recent Labs   Lab Result Units 09/04/20  1832   WBC K/uL 8.10   Hemoglobin g/dL 12.2*   Hematocrit % 39.5*   Platelets K/uL 344   Gran% % 65.9   Lymph% % 23.7   Mono% % 8.8   Eosinophil% % 1.2   Basophil% % 0.2   Differential Method  Automated       Metabolic Panel (last 72 hours):  Recent Labs   Lab Result Units 09/04/20  1833   Sodium mmol/L 138   Potassium mmol/L 4.8   Chloride mmol/L 99   CO2 mmol/L 30*   Glucose mg/dL 287*   BUN, Bld mg/dL 13   Creatinine mg/dL 1.0   Albumin g/dL 3.3*   Total Bilirubin mg/dL 0.3   Alkaline Phosphatase U/L 80   AST U/L 13   ALT U/L 15       Drug levels (last 3 results):  No results for input(s): VANCOMYCINRA, VANCOMYCINPE, VANCOMYCINTR in the last 72 hours.    Microbiologic Results:  Microbiology Results (last 7 days)       Procedure Component Value Units Date/Time    Aerobic culture [[id number]]     Order Status: Canceled Specimen: Wound from Foot, Right     Aerobic culture (Specify Source) **CANNOT BE ORDERED AS STAT** [[id number]]  Collected: 09/04/20 1924    Order Status: Sent Specimen: Wound from Foot, Right Updated: 09/04/20 1929    Blood culture x two cultures. Draw prior to antibiotics. [[id number]] Collected: 09/04/20 1914    Order Status: Sent Specimen: Blood from Antecubital, Right Arm Updated: 09/04/20 1914    Blood culture x two cultures. Draw prior to antibiotics. [[id number]] Collected: 09/04/20 1914    Order Status: Sent Specimen: Blood from Antecubital, Left Arm Updated: 09/04/20 1914

## 2020-09-05 NOTE — ASSESSMENT & PLAN NOTE
Continue IV vanc, Zosyn.  Podiatrist consulted.  S/P extensive debridement.  Plan for wound VAC.  Follow culture.  Consult with Infectious Disease-appreciate recommendations.  Need tighter glycemic control, discussed with Pt the importance of diabetic care compliance.

## 2020-09-06 LAB
ALBUMIN SERPL BCP-MCNC: 2.6 G/DL (ref 3.5–5.2)
ALP SERPL-CCNC: 71 U/L (ref 55–135)
ALT SERPL W/O P-5'-P-CCNC: 13 U/L (ref 10–44)
ANION GAP SERPL CALC-SCNC: 9 MMOL/L (ref 8–16)
AST SERPL-CCNC: 13 U/L (ref 10–40)
BASOPHILS # BLD AUTO: 0.02 K/UL (ref 0–0.2)
BASOPHILS NFR BLD: 0.2 % (ref 0–1.9)
BILIRUB SERPL-MCNC: 0.4 MG/DL (ref 0.1–1)
BUN SERPL-MCNC: 11 MG/DL (ref 6–20)
CALCIUM SERPL-MCNC: 8.9 MG/DL (ref 8.7–10.5)
CHLORIDE SERPL-SCNC: 100 MMOL/L (ref 95–110)
CO2 SERPL-SCNC: 28 MMOL/L (ref 23–29)
CREAT SERPL-MCNC: 1.1 MG/DL (ref 0.5–1.4)
DIFFERENTIAL METHOD: ABNORMAL
EOSINOPHIL # BLD AUTO: 0.1 K/UL (ref 0–0.5)
EOSINOPHIL NFR BLD: 1 % (ref 0–8)
ERYTHROCYTE [DISTWIDTH] IN BLOOD BY AUTOMATED COUNT: 11.3 % (ref 11.5–14.5)
EST. GFR  (AFRICAN AMERICAN): >60 ML/MIN/1.73 M^2
EST. GFR  (NON AFRICAN AMERICAN): >60 ML/MIN/1.73 M^2
GLUCOSE SERPL-MCNC: 280 MG/DL (ref 70–110)
HCT VFR BLD AUTO: 37.3 % (ref 40–54)
HGB BLD-MCNC: 11.3 G/DL (ref 14–18)
IMM GRANULOCYTES # BLD AUTO: 0.04 K/UL (ref 0–0.04)
IMM GRANULOCYTES NFR BLD AUTO: 0.4 % (ref 0–0.5)
LYMPHOCYTES # BLD AUTO: 1.8 K/UL (ref 1–4.8)
LYMPHOCYTES NFR BLD: 20.3 % (ref 18–48)
MAGNESIUM SERPL-MCNC: 1.9 MG/DL (ref 1.6–2.6)
MCH RBC QN AUTO: 27.1 PG (ref 27–31)
MCHC RBC AUTO-ENTMCNC: 30.3 G/DL (ref 32–36)
MCV RBC AUTO: 89 FL (ref 82–98)
MONOCYTES # BLD AUTO: 0.9 K/UL (ref 0.3–1)
MONOCYTES NFR BLD: 9.6 % (ref 4–15)
NEUTROPHILS # BLD AUTO: 6.1 K/UL (ref 1.8–7.7)
NEUTROPHILS NFR BLD: 68.5 % (ref 38–73)
NRBC BLD-RTO: 0 /100 WBC
PHOSPHATE SERPL-MCNC: 3.8 MG/DL (ref 2.7–4.5)
PLATELET # BLD AUTO: 335 K/UL (ref 150–350)
PMV BLD AUTO: 8.8 FL (ref 9.2–12.9)
POCT GLUCOSE: 180 MG/DL (ref 70–110)
POCT GLUCOSE: 221 MG/DL (ref 70–110)
POCT GLUCOSE: 250 MG/DL (ref 70–110)
POCT GLUCOSE: 323 MG/DL (ref 70–110)
POTASSIUM SERPL-SCNC: 3.9 MMOL/L (ref 3.5–5.1)
PROT SERPL-MCNC: 6.5 G/DL (ref 6–8.4)
RBC # BLD AUTO: 4.17 M/UL (ref 4.6–6.2)
SODIUM SERPL-SCNC: 137 MMOL/L (ref 136–145)
VANCOMYCIN TROUGH SERPL-MCNC: 10.7 UG/ML (ref 10–22)
WBC # BLD AUTO: 8.92 K/UL (ref 3.9–12.7)

## 2020-09-06 PROCEDURE — 63600175 PHARM REV CODE 636 W HCPCS: Performed by: INTERNAL MEDICINE

## 2020-09-06 PROCEDURE — 25000003 PHARM REV CODE 250: Performed by: INTERNAL MEDICINE

## 2020-09-06 PROCEDURE — 25000003 PHARM REV CODE 250: Performed by: NURSE PRACTITIONER

## 2020-09-06 PROCEDURE — 36415 COLL VENOUS BLD VENIPUNCTURE: CPT

## 2020-09-06 PROCEDURE — 80053 COMPREHEN METABOLIC PANEL: CPT

## 2020-09-06 PROCEDURE — 63600175 PHARM REV CODE 636 W HCPCS: Performed by: NURSE PRACTITIONER

## 2020-09-06 PROCEDURE — 80202 ASSAY OF VANCOMYCIN: CPT

## 2020-09-06 PROCEDURE — 97605 NEG PRS WND THER DME<=50SQCM: CPT

## 2020-09-06 PROCEDURE — 84100 ASSAY OF PHOSPHORUS: CPT

## 2020-09-06 PROCEDURE — 94761 N-INVAS EAR/PLS OXIMETRY MLT: CPT

## 2020-09-06 PROCEDURE — 85025 COMPLETE CBC W/AUTO DIFF WBC: CPT

## 2020-09-06 PROCEDURE — 83735 ASSAY OF MAGNESIUM: CPT

## 2020-09-06 PROCEDURE — 12000002 HC ACUTE/MED SURGE SEMI-PRIVATE ROOM

## 2020-09-06 RX ORDER — INSULIN ASPART 100 [IU]/ML
3 INJECTION, SOLUTION INTRAVENOUS; SUBCUTANEOUS
Status: DISCONTINUED | OUTPATIENT
Start: 2020-09-06 | End: 2020-09-08 | Stop reason: HOSPADM

## 2020-09-06 RX ADMIN — PIPERACILLIN SODIUM AND TAZOBACTAM SODIUM 4.5 G: 4; .5 INJECTION, POWDER, LYOPHILIZED, FOR SOLUTION INTRAVENOUS at 08:09

## 2020-09-06 RX ADMIN — PIPERACILLIN SODIUM AND TAZOBACTAM SODIUM 4.5 G: 4; .5 INJECTION, POWDER, LYOPHILIZED, FOR SOLUTION INTRAVENOUS at 05:09

## 2020-09-06 RX ADMIN — INSULIN ASPART 2 UNITS: 100 INJECTION, SOLUTION INTRAVENOUS; SUBCUTANEOUS at 10:09

## 2020-09-06 RX ADMIN — INSULIN ASPART 3 UNITS: 100 INJECTION, SOLUTION INTRAVENOUS; SUBCUTANEOUS at 05:09

## 2020-09-06 RX ADMIN — COLLAGENASE SANTYL: 250 OINTMENT TOPICAL at 08:09

## 2020-09-06 RX ADMIN — ACETAMINOPHEN 650 MG: 325 TABLET ORAL at 11:09

## 2020-09-06 RX ADMIN — INSULIN ASPART 2 UNITS: 100 INJECTION, SOLUTION INTRAVENOUS; SUBCUTANEOUS at 05:09

## 2020-09-06 RX ADMIN — VANCOMYCIN HYDROCHLORIDE 1500 MG: 1.5 INJECTION, POWDER, LYOPHILIZED, FOR SOLUTION INTRAVENOUS at 09:09

## 2020-09-06 RX ADMIN — INSULIN ASPART 3 UNITS: 100 INJECTION, SOLUTION INTRAVENOUS; SUBCUTANEOUS at 08:09

## 2020-09-06 RX ADMIN — INSULIN ASPART 3 UNITS: 100 INJECTION, SOLUTION INTRAVENOUS; SUBCUTANEOUS at 11:09

## 2020-09-06 RX ADMIN — PIPERACILLIN SODIUM AND TAZOBACTAM SODIUM 4.5 G: 4; .5 INJECTION, POWDER, LYOPHILIZED, FOR SOLUTION INTRAVENOUS at 12:09

## 2020-09-06 RX ADMIN — INSULIN ASPART 8 UNITS: 100 INJECTION, SOLUTION INTRAVENOUS; SUBCUTANEOUS at 11:09

## 2020-09-06 RX ADMIN — VANCOMYCIN HYDROCHLORIDE 1500 MG: 1.5 INJECTION, POWDER, LYOPHILIZED, FOR SOLUTION INTRAVENOUS at 10:09

## 2020-09-06 NOTE — ASSESSMENT & PLAN NOTE
Acute problem-S/P extensive debridement.  Will need wound VAC-nursing to perform wound care until wound VAC replaced.  Continue IV Abx.  Aggressive glucose control  MRI negative for osteomyelitis, however small joint effusion.  Elevated CRP.  Will likely need extended abx therapy.

## 2020-09-06 NOTE — PT/OT/SLP DISCHARGE
Occupational Therapy Discharge Summary    Garret Munguia  MRN: [id number]   Principal Problem: Diabetic infection of right foot      Patient Discharged from acute Occupational Therapy on 9/6/2020.    Assessment:     Pt is Independent with ADLs. No significant deficits noted.    Objective:     GOALS:   Multidisciplinary Problems     Occupational Therapy Goals     Not on file                Reasons for Discontinuation of Therapy Services  No functional deficits noted.    Plan:     Patient Discharged to: Home no OT services needed.    Luda Laboy, OTR  9/6/2020

## 2020-09-06 NOTE — RESPIRATORY THERAPY
09/05/20 1959   Patient Assessment/Suction   Level of Consciousness (AVPU) alert   Respiratory Effort Unlabored   PRE-TX-O2   O2 Device (Oxygen Therapy) room air   SpO2 95 %   Pulse Oximetry Type Intermittent   $ Pulse Oximetry - Multiple Charge Pulse Oximetry - Multiple

## 2020-09-06 NOTE — CARE UPDATE
09/06/20 0652   Patient Assessment/Suction   Level of Consciousness (AVPU) alert   PRE-TX-O2   O2 Device (Oxygen Therapy) room air   SpO2 96 %   Pulse Oximetry Type Intermittent   $ Pulse Oximetry - Multiple Charge Pulse Oximetry - Multiple   Pulse 85   Resp 18

## 2020-09-06 NOTE — PROGRESS NOTES
[facility] Hospital Medicine  Progress Note    Patient Name: Garret Munguia  MRN: [id number]  Patient Class: IP- Inpatient   Admission Date: 9/4/2020  Length of Stay: 1 days  Attending Physician: Anjel eBrnstein MD  Primary Care Provider: Primary Doctor No        Subjective:     Principal Problem:Diabetic infection of right foot        HPI:  Garret Munguia is a 50-year-old male who presents to the emergency room for evaluation of wound infection to the right foot.  He reports his wound onset approximately 1-2 weeks ago.  He denies any known injury or trauma.  He denies fever chills.  No known sick contacts or travel.  He describes minimal pain to the right foot wound.  No aggravating alleviating factors.  Previous medical history includes type 2 diabetes.  ER workup:  CBC with mild anemia.  CMP with glucose of 287.  X-ray of the right foot was negative for osteomyelitis.  Wound and blood cultures are pending.  Lactic acid was within normal limits.  Patient was given vancomycin and Zosyn in the ER.  Patient will be admitted to Hospital Medicine for observation and management.  Will consult podiatry and wound care.    Overview/Hospital Course:  No notes on file    Interval History:  Seen and examined.  Pt denies any pain to right foot.  Denies any chest pain or SOB.  No nausea or vomiting.  Discussed with Pt hemoglobin A1c of 13.  Discussed plan for insulin as well as metformin on discharge.  Glucose running 230-250.  Titrating insulin.  Nursing to perform wound care until wound VAC can be placed.  Did spike temp overnight with 24 hr T-max 100.4°.  Preliminary culture results reviewed-presumptive Proteus species.  Blood cultures negative.    Review of Systems   Constitutional: Positive for fever. Negative for chills.   HENT: Negative for trouble swallowing.    Respiratory: Negative for cough and shortness of breath.    Cardiovascular: Negative for chest pain.   Gastrointestinal: Negative for nausea and  vomiting.   Genitourinary: Negative for dysuria.   Musculoskeletal: Positive for joint swelling. Negative for arthralgias and myalgias.   Skin: Positive for wound.   Neurological: Negative for weakness and headaches.   Psychiatric/Behavioral: Negative for confusion.     Objective:     Vital Signs (Most Recent):  Temp: 97.6 °F (36.4 °C) (09/06/20 0812)  Pulse: 76 (09/06/20 0812)  Resp: 18 (09/06/20 0812)  BP: 121/68 (09/06/20 0812)  SpO2: 96 % (09/06/20 0812) Vital Signs (24h Range):  Temp:  [97.6 °F (36.4 °C)-100.4 °F (38 °C)] 97.6 °F (36.4 °C)  Pulse:  [76-96] 76  Resp:  [18] 18  SpO2:  [94 %-96 %] 96 %  BP: (121-150)/(68-80) 121/68     Weight: 98.2 kg (216 lb 7.9 oz)  Body mass index is 30.19 kg/m².    Intake/Output Summary (Last 24 hours) at 9/6/2020 1024  Last data filed at 9/6/2020 0600  Gross per 24 hour   Intake 1120 ml   Output 1200 ml   Net -80 ml      Physical Exam  Vitals signs and nursing note reviewed.   Constitutional:       Appearance: Normal appearance. He is normal weight.   HENT:      Head: Normocephalic and atraumatic.      Mouth/Throat:      Mouth: Mucous membranes are moist.      Pharynx: Oropharynx is clear.   Eyes:      Extraocular Movements: Extraocular movements intact.      Conjunctiva/sclera: Conjunctivae normal.      Pupils: Pupils are equal, round, and reactive to light.   Neck:      Musculoskeletal: Normal range of motion and neck supple.   Cardiovascular:      Rate and Rhythm: Normal rate and regular rhythm.      Pulses: Normal pulses.      Heart sounds: Normal heart sounds.   Pulmonary:      Effort: Pulmonary effort is normal.      Breath sounds: Normal breath sounds.   Abdominal:      General: Abdomen is flat. Bowel sounds are normal.      Palpations: Abdomen is soft.   Musculoskeletal: Normal range of motion.   Skin:     General: Skin is warm.      Capillary Refill: Capillary refill takes less than 2 seconds.      Comments: Right foot wrapped post debridement yesterday.  Nursing to  perform wound care today until wound VAC in place.   Neurological:      Mental Status: He is alert.   Psychiatric:         Mood and Affect: Mood normal.             Significant Labs:   CBC:   Recent Labs   Lab 09/04/20  1832 09/05/20  0605 09/06/20  0444   WBC 8.10 8.31 8.92   HGB 12.2* 11.5* 11.3*   HCT 39.5* 37.8* 37.3*    323 335     CMP:   Recent Labs   Lab 09/04/20  1833 09/05/20  0605 09/06/20  0444    139 137   K 4.8 4.2 3.9   CL 99 101 100   CO2 30* 29 28   * 250* 280*   BUN 13 10 11   CREATININE 1.0 0.9 1.1   CALCIUM 9.5 8.9 8.9   PROT 7.3 6.7 6.5   ALBUMIN 3.3* 2.9* 2.6*   BILITOT 0.3 0.5 0.4   ALKPHOS 80 69 71   AST 13 11 13   ALT 15 11 13   ANIONGAP 9 9 9   EGFRNONAA >60 >60 >60       Significant Imaging: I have reviewed all pertinent imaging results/findings within the past 24 hours.      Assessment/Plan:      * Diabetic infection of right foot  Preliminary cultures growing Proteus- Continue IV vanc, Zosyn.  Defer antibiotic changes to ID who is following.  Appreciate recommendations.  Podiatrist consulted.  S/P extensive debridement.  Plan for wound VAC-nursing to perform wound care per Podiatry recommendations until wound VAC can be placed.  Need tighter glycemic control, discussed with Pt the importance of diabetic care compliance.      Foot ulceration, right, with unspecified severity  Acute problem-S/P extensive debridement.  Will need wound VAC-nursing to perform wound care until wound VAC replaced.  Continue IV Abx.  Aggressive glucose control  MRI negative for osteomyelitis, however small joint effusion.  Elevated CRP.  Will likely need extended abx therapy.        Type 2 diabetes mellitus with foot ulcer, without long-term current use of insulin  Acute on chronic problem  Patient's FSGs are not controlled on current hypoglycemics.   Hemoglobin A1c pending  Most recent fingerstick glucose reviewed-   Recent Labs   Lab 09/05/20  1221 09/05/20  1657 09/05/20 2041  09/06/20  0553   POCTGLUCOSE 257* 233* 248* 250*     Current correctional scale  Medium  Increase anti-hyperglycemic dose as follows-   Antihyperglycemics (From admission, onward)    Start     Stop Route Frequency Ordered    09/06/20 2100  insulin detemir U-100 pen 15 Units      -- SubQ Nightly 09/06/20 0756    09/06/20 0815  insulin aspart U-100 pen 3 Units      -- SubQ 3 times daily with meals 09/06/20 0708    09/04/20 2345  insulin aspart U-100 pen 1-10 Units      -- SubQ Before meals & nightly PRN 09/04/20 2345        Hold Oral hypoglycemics while patient is in the hospital.  Discussed the importance of glycemic control.  Will need new orders for hypoglycemic agent upon discharge-we discussed insulin as well as oral regimen.  Consult Diabetic educator.  Will need glucose meter upon discharge.      VTE Risk Mitigation (From admission, onward)         Ordered     IP VTE LOW RISK PATIENT  Once      09/04/20 2345     Place sequential compression device  Until discontinued      09/04/20 2345     Place CHRISTOPHER hose  Until discontinued      09/04/20 2345                Discharge Planning   MELISA:  9/9/20    Code Status: Full Code   Is the patient medically ready for discharge?:     Reason for patient still in hospital (select all that apply): Treatment and Consult recommendations  Discharge Plan A: Home Health                  Cathy Menjivar NP  Department of Hospital Medicine   [facility]

## 2020-09-06 NOTE — SUBJECTIVE & OBJECTIVE
Interval History:  Seen and examined.  Pt denies any pain to right foot.  Denies any chest pain or SOB.  No nausea or vomiting.  Discussed with Pt hemoglobin A1c of 13.  Discussed plan for insulin as well as metformin on discharge.  Glucose running 230-250.  Titrating insulin.  Nursing to perform wound care until wound VAC can be placed.  Did spike temp overnight with 24 hr T-max 100.4°.  Preliminary culture results reviewed-presumptive Proteus species.  Blood cultures negative.    Review of Systems   Constitutional: Positive for fever. Negative for chills.   HENT: Negative for trouble swallowing.    Respiratory: Negative for cough and shortness of breath.    Cardiovascular: Negative for chest pain.   Gastrointestinal: Negative for nausea and vomiting.   Genitourinary: Negative for dysuria.   Musculoskeletal: Positive for joint swelling. Negative for arthralgias and myalgias.   Skin: Positive for wound.   Neurological: Negative for weakness and headaches.   Psychiatric/Behavioral: Negative for confusion.     Objective:     Vital Signs (Most Recent):  Temp: 97.6 °F (36.4 °C) (09/06/20 0812)  Pulse: 76 (09/06/20 0812)  Resp: 18 (09/06/20 0812)  BP: 121/68 (09/06/20 0812)  SpO2: 96 % (09/06/20 0812) Vital Signs (24h Range):  Temp:  [97.6 °F (36.4 °C)-100.4 °F (38 °C)] 97.6 °F (36.4 °C)  Pulse:  [76-96] 76  Resp:  [18] 18  SpO2:  [94 %-96 %] 96 %  BP: (121-150)/(68-80) 121/68     Weight: 98.2 kg (216 lb 7.9 oz)  Body mass index is 30.19 kg/m².    Intake/Output Summary (Last 24 hours) at 9/6/2020 1024  Last data filed at 9/6/2020 0600  Gross per 24 hour   Intake 1120 ml   Output 1200 ml   Net -80 ml      Physical Exam  Vitals signs and nursing note reviewed.   Constitutional:       Appearance: Normal appearance. He is normal weight.   HENT:      Head: Normocephalic and atraumatic.      Mouth/Throat:      Mouth: Mucous membranes are moist.      Pharynx: Oropharynx is clear.   Eyes:      Extraocular Movements: Extraocular  movements intact.      Conjunctiva/sclera: Conjunctivae normal.      Pupils: Pupils are equal, round, and reactive to light.   Neck:      Musculoskeletal: Normal range of motion and neck supple.   Cardiovascular:      Rate and Rhythm: Normal rate and regular rhythm.      Pulses: Normal pulses.      Heart sounds: Normal heart sounds.   Pulmonary:      Effort: Pulmonary effort is normal.      Breath sounds: Normal breath sounds.   Abdominal:      General: Abdomen is flat. Bowel sounds are normal.      Palpations: Abdomen is soft.   Musculoskeletal: Normal range of motion.   Skin:     General: Skin is warm.      Capillary Refill: Capillary refill takes less than 2 seconds.      Comments: Right foot wrapped post debridement yesterday.  Nursing to perform wound care today until wound VAC in place.   Neurological:      Mental Status: He is alert.   Psychiatric:         Mood and Affect: Mood normal.             Significant Labs:   CBC:   Recent Labs   Lab 09/04/20 1832 09/05/20  0605 09/06/20  0444   WBC 8.10 8.31 8.92   HGB 12.2* 11.5* 11.3*   HCT 39.5* 37.8* 37.3*    323 335     CMP:   Recent Labs   Lab 09/04/20  1833 09/05/20  0605 09/06/20  0444    139 137   K 4.8 4.2 3.9   CL 99 101 100   CO2 30* 29 28   * 250* 280*   BUN 13 10 11   CREATININE 1.0 0.9 1.1   CALCIUM 9.5 8.9 8.9   PROT 7.3 6.7 6.5   ALBUMIN 3.3* 2.9* 2.6*   BILITOT 0.3 0.5 0.4   ALKPHOS 80 69 71   AST 13 11 13   ALT 15 11 13   ANIONGAP 9 9 9   EGFRNONAA >60 >60 >60       Significant Imaging: I have reviewed all pertinent imaging results/findings within the past 24 hours.

## 2020-09-06 NOTE — ASSESSMENT & PLAN NOTE
Acute on chronic problem  Patient's FSGs are not controlled on current hypoglycemics.   Hemoglobin A1c pending  Most recent fingerstick glucose reviewed-   Recent Labs   Lab 09/05/20  1221 09/05/20  1657 09/05/20  2041 09/06/20  0553   POCTGLUCOSE 257* 233* 248* 250*     Current correctional scale  Medium  Increase anti-hyperglycemic dose as follows-   Antihyperglycemics (From admission, onward)    Start     Stop Route Frequency Ordered    09/06/20 2100  insulin detemir U-100 pen 15 Units      -- SubQ Nightly 09/06/20 0756    09/06/20 0815  insulin aspart U-100 pen 3 Units      -- SubQ 3 times daily with meals 09/06/20 0708    09/04/20 2345  insulin aspart U-100 pen 1-10 Units      -- SubQ Before meals & nightly PRN 09/04/20 2345        Hold Oral hypoglycemics while patient is in the hospital.  Discussed the importance of glycemic control.  Will need new orders for hypoglycemic agent upon discharge-we discussed insulin as well as oral regimen.  Consult Diabetic educator.  Will need glucose meter upon discharge.

## 2020-09-06 NOTE — ASSESSMENT & PLAN NOTE
Preliminary cultures growing Proteus- Continue IV vanc, Zosyn.  Defer antibiotic changes to ID who is following.  Appreciate recommendations.  Podiatrist consulted.  S/P extensive debridement.  Plan for wound VAC-nursing to perform wound care per Podiatry recommendations until wound VAC can be placed.  Need tighter glycemic control, discussed with Pt the importance of diabetic care compliance.

## 2020-09-06 NOTE — PROGRESS NOTES
Pharmacokinetic Assessment Follow Up: IV Vancomycin    Vancomycin serum concentration assessment(s):    The trough level was drawn correctly and can be used to guide therapy at this time. The measurement is within the desired definitive target range of 10 to 15 mcg/mL.    Vancomycin Regimen Plan:    Continue regimen to Vancomycin 1500 mg IV every 12 hours with next serum trough concentration measured at 2100 prior to 4th dose on 09/07/2020    Drug levels (last 3 results):  Recent Labs   Lab Result Units 09/06/20  0922   Vancomycin-Trough ug/mL 10.7       Pharmacy will continue to follow and monitor vancomycin.    Please contact pharmacy at extension [phone] for questions regarding this assessment.    Thank you for the consult,   Frank Villanueva       Patient brief summary:  Garret Munguia is a 50 y.o. male initiated on antimicrobial therapy with IV Vancomycin for treatment of skin & soft tissue infection    The patient's current regimen is 1500mg q12h    Drug Allergies:   Review of patient's allergies indicates:  No Known Allergies    Actual Body Weight:   98.2kg    Renal Function:   Estimated Creatinine Clearance: 96 mL/min (based on SCr of 1.1 mg/dL).,     CBC (last 72 hours):  Recent Labs   Lab Result Units 09/04/20 1832 09/05/20 0605 09/06/20  0444   WBC K/uL 8.10 8.31 8.92   Hemoglobin g/dL 12.2* 11.5* 11.3*   Hemoglobin A1C %  --  13.0*  --    Hematocrit % 39.5* 37.8* 37.3*   Platelets K/uL 344 323 335   Gran% % 65.9 66.3 68.5   Lymph% % 23.7 23.0 20.3   Mono% % 8.8 8.9 9.6   Eosinophil% % 1.2 1.4 1.0   Basophil% % 0.2 0.2 0.2   Differential Method  Automated Automated Automated       Metabolic Panel (last 72 hours):  Recent Labs   Lab Result Units 09/04/20  1833 09/05/20  0605 09/06/20  0444   Sodium mmol/L 138 139 137   Potassium mmol/L 4.8 4.2 3.9   Chloride mmol/L 99 101 100   CO2 mmol/L 30* 29 28   Glucose mg/dL 287* 250* 280*   BUN, Bld mg/dL 13 10 11   Creatinine mg/dL 1.0 0.9 1.1   Albumin g/dL 3.3* 2.9*  2.6*   Total Bilirubin mg/dL 0.3 0.5 0.4   Alkaline Phosphatase U/L 80 69 71   AST U/L 13 11 13   ALT U/L 15 11 13   Magnesium mg/dL  --  1.9 1.9   Phosphorus mg/dL  --  3.8 3.8       Vancomycin Administrations:  vancomycin given in the last 96 hours                     vancomycin 1.5 g in dextrose 5 % 250 mL IVPB (ready to mix) (mg) 1,500 mg New Bag 09/06/20 0947     1,500 mg New Bag 09/05/20 2141     1,500 mg New Bag  0913    vancomycin (VANCOCIN) 2,250 mg in dextrose 5 % 500 mL IVPB (mg) 2,250 mg New Bag 09/04/20 2142                    Microbiologic Results:  Microbiology Results (last 7 days)       Procedure Component Value Units Date/Time    Aerobic culture (Specify Source) **CANNOT BE ORDERED AS STAT** [[id number]]  (Abnormal) Collected: 09/04/20 1924    Order Status: Completed Specimen: Wound from Foot, Right Updated: 09/06/20 0908     Aerobic Bacterial Culture PRESUMPTIVE PROTEUS SPECIES  Moderate  Identification and susceptibility pending      Blood culture x two cultures. Draw prior to antibiotics. [[id number]] Collected: 09/04/20 1914    Order Status: Completed Specimen: Blood from Antecubital, Left Arm Updated: 09/06/20 0613     Blood Culture, Routine No Growth to date      No Growth to date    Narrative:      Aerobic and anaerobic    Blood culture x two cultures. Draw prior to antibiotics. [[id number]] Collected: 09/04/20 1914    Order Status: Completed Specimen: Blood from Antecubital, Right Arm Updated: 09/06/20 0613     Blood Culture, Routine No Growth to date      No Growth to date    Narrative:      Aerobic and anaerobic    Aerobic culture [[id number]]     Order Status: Canceled Specimen: Wound from Foot, Right

## 2020-09-06 NOTE — CONSULTS
Consulted for wound vac placement to right plantar foot. Wound evaluated with patients nurse Paulina and nurse Leader Yocasta. Wound bed red with 25% necrotic area to the anterior aspect of the foot. Wound measures 3.5cm x 5cm x 0.8cm with undermining from 6 o'clock to 3 o'clock measuring 1cm. Wound vac foam dressing applied to wound bed under the undermining area. Bridged the foam dressing over an occlusive dressing to have the suction tubing along the dorsal aspect of the right foot. This is a diabetic foot ulcer which patient reports was originally a blister that he popped at home resulting in this current wound.    Pt tolerated wound vac dressing application well.

## 2020-09-06 NOTE — PLAN OF CARE
Plan of care reviewed with pt,verbalized understanding. Dressing to the right foot clean and intact, no bleeding was noted. Urinal at reach. Had low grade fever all night. Call light kept within reach, bed in locked and lowest position, side rails up x2.

## 2020-09-07 ENCOUNTER — OUTSIDE PLACE OF SERVICE (OUTPATIENT)
Dept: INFECTIOUS DISEASES | Facility: CLINIC | Age: 50
End: 2020-09-07
Payer: COMMERCIAL

## 2020-09-07 DIAGNOSIS — E11.628 DIABETIC INFECTION OF RIGHT FOOT: ICD-10-CM

## 2020-09-07 DIAGNOSIS — L97.519 FOOT ULCERATION, RIGHT, WITH UNSPECIFIED SEVERITY: ICD-10-CM

## 2020-09-07 DIAGNOSIS — E11.42 DIABETIC POLYNEUROPATHY ASSOCIATED WITH TYPE 2 DIABETES MELLITUS: ICD-10-CM

## 2020-09-07 DIAGNOSIS — L97.509 TYPE 2 DIABETES MELLITUS WITH FOOT ULCER, WITHOUT LONG-TERM CURRENT USE OF INSULIN: ICD-10-CM

## 2020-09-07 DIAGNOSIS — L08.9 DIABETIC INFECTION OF RIGHT FOOT: ICD-10-CM

## 2020-09-07 DIAGNOSIS — E11.621 TYPE 2 DIABETES MELLITUS WITH FOOT ULCER, WITHOUT LONG-TERM CURRENT USE OF INSULIN: ICD-10-CM

## 2020-09-07 LAB
ALBUMIN SERPL BCP-MCNC: 2.5 G/DL (ref 3.5–5.2)
ALP SERPL-CCNC: 68 U/L (ref 55–135)
ALT SERPL W/O P-5'-P-CCNC: 17 U/L (ref 10–44)
ANION GAP SERPL CALC-SCNC: 9 MMOL/L (ref 8–16)
AST SERPL-CCNC: 16 U/L (ref 10–40)
BASOPHILS # BLD AUTO: 0.03 K/UL (ref 0–0.2)
BASOPHILS NFR BLD: 0.3 % (ref 0–1.9)
BILIRUB SERPL-MCNC: 0.4 MG/DL (ref 0.1–1)
BUN SERPL-MCNC: 15 MG/DL (ref 6–20)
CALCIUM SERPL-MCNC: 9.1 MG/DL (ref 8.7–10.5)
CHLORIDE SERPL-SCNC: 101 MMOL/L (ref 95–110)
CO2 SERPL-SCNC: 27 MMOL/L (ref 23–29)
CREAT SERPL-MCNC: 1 MG/DL (ref 0.5–1.4)
DIFFERENTIAL METHOD: ABNORMAL
EOSINOPHIL # BLD AUTO: 0.1 K/UL (ref 0–0.5)
EOSINOPHIL NFR BLD: 1.5 % (ref 0–8)
ERYTHROCYTE [DISTWIDTH] IN BLOOD BY AUTOMATED COUNT: 11.3 % (ref 11.5–14.5)
EST. GFR  (AFRICAN AMERICAN): >60 ML/MIN/1.73 M^2
EST. GFR  (NON AFRICAN AMERICAN): >60 ML/MIN/1.73 M^2
GLUCOSE SERPL-MCNC: 257 MG/DL (ref 70–110)
HCT VFR BLD AUTO: 35.4 % (ref 40–54)
HGB BLD-MCNC: 10.9 G/DL (ref 14–18)
IMM GRANULOCYTES # BLD AUTO: 0.03 K/UL (ref 0–0.04)
IMM GRANULOCYTES NFR BLD AUTO: 0.3 % (ref 0–0.5)
LYMPHOCYTES # BLD AUTO: 2 K/UL (ref 1–4.8)
LYMPHOCYTES NFR BLD: 21.4 % (ref 18–48)
MAGNESIUM SERPL-MCNC: 1.9 MG/DL (ref 1.6–2.6)
MCH RBC QN AUTO: 26.8 PG (ref 27–31)
MCHC RBC AUTO-ENTMCNC: 30.8 G/DL (ref 32–36)
MCV RBC AUTO: 87 FL (ref 82–98)
MONOCYTES # BLD AUTO: 0.8 K/UL (ref 0.3–1)
MONOCYTES NFR BLD: 8.8 % (ref 4–15)
NEUTROPHILS # BLD AUTO: 6.3 K/UL (ref 1.8–7.7)
NEUTROPHILS NFR BLD: 67.7 % (ref 38–73)
NRBC BLD-RTO: 0 /100 WBC
PHOSPHATE SERPL-MCNC: 3.8 MG/DL (ref 2.7–4.5)
PLATELET # BLD AUTO: 344 K/UL (ref 150–350)
PMV BLD AUTO: 8.7 FL (ref 9.2–12.9)
POCT GLUCOSE: 197 MG/DL (ref 70–110)
POCT GLUCOSE: 233 MG/DL (ref 70–110)
POCT GLUCOSE: 267 MG/DL (ref 70–110)
POCT GLUCOSE: 309 MG/DL (ref 70–110)
POTASSIUM SERPL-SCNC: 4 MMOL/L (ref 3.5–5.1)
PROT SERPL-MCNC: 6.5 G/DL (ref 6–8.4)
RBC # BLD AUTO: 4.06 M/UL (ref 4.6–6.2)
SODIUM SERPL-SCNC: 137 MMOL/L (ref 136–145)
WBC # BLD AUTO: 9.33 K/UL (ref 3.9–12.7)

## 2020-09-07 PROCEDURE — 99231 PR SUBSEQUENT HOSPITAL CARE,LEVL I: ICD-10-PCS | Mod: S$GLB,,, | Performed by: INTERNAL MEDICINE

## 2020-09-07 PROCEDURE — 99231 SBSQ HOSP IP/OBS SF/LOW 25: CPT | Mod: S$GLB,,, | Performed by: INTERNAL MEDICINE

## 2020-09-07 PROCEDURE — 63600175 PHARM REV CODE 636 W HCPCS: Performed by: NURSE PRACTITIONER

## 2020-09-07 PROCEDURE — 85025 COMPLETE CBC W/AUTO DIFF WBC: CPT

## 2020-09-07 PROCEDURE — 25000003 PHARM REV CODE 250: Performed by: NURSE PRACTITIONER

## 2020-09-07 PROCEDURE — 80053 COMPREHEN METABOLIC PANEL: CPT

## 2020-09-07 PROCEDURE — 94761 N-INVAS EAR/PLS OXIMETRY MLT: CPT

## 2020-09-07 PROCEDURE — 84100 ASSAY OF PHOSPHORUS: CPT

## 2020-09-07 PROCEDURE — 12000002 HC ACUTE/MED SURGE SEMI-PRIVATE ROOM

## 2020-09-07 PROCEDURE — 97803 MED NUTRITION INDIV SUBSEQ: CPT

## 2020-09-07 PROCEDURE — 83735 ASSAY OF MAGNESIUM: CPT

## 2020-09-07 PROCEDURE — 36415 COLL VENOUS BLD VENIPUNCTURE: CPT

## 2020-09-07 RX ADMIN — INSULIN ASPART 3 UNITS: 100 INJECTION, SOLUTION INTRAVENOUS; SUBCUTANEOUS at 05:09

## 2020-09-07 RX ADMIN — ACETAMINOPHEN 650 MG: 325 TABLET ORAL at 09:09

## 2020-09-07 RX ADMIN — ACETAMINOPHEN 650 MG: 325 TABLET ORAL at 12:09

## 2020-09-07 RX ADMIN — PIPERACILLIN SODIUM AND TAZOBACTAM SODIUM 4.5 G: 4; .5 INJECTION, POWDER, LYOPHILIZED, FOR SOLUTION INTRAVENOUS at 12:09

## 2020-09-07 RX ADMIN — INSULIN ASPART 3 UNITS: 100 INJECTION, SOLUTION INTRAVENOUS; SUBCUTANEOUS at 11:09

## 2020-09-07 RX ADMIN — INSULIN ASPART 3 UNITS: 100 INJECTION, SOLUTION INTRAVENOUS; SUBCUTANEOUS at 06:09

## 2020-09-07 RX ADMIN — INSULIN ASPART 8 UNITS: 100 INJECTION, SOLUTION INTRAVENOUS; SUBCUTANEOUS at 05:09

## 2020-09-07 RX ADMIN — INSULIN ASPART 6 UNITS: 100 INJECTION, SOLUTION INTRAVENOUS; SUBCUTANEOUS at 11:09

## 2020-09-07 RX ADMIN — DOCUSATE SODIUM 50 MG AND SENNOSIDES 8.6 MG 1 TABLET: 8.6; 5 TABLET, FILM COATED ORAL at 08:09

## 2020-09-07 RX ADMIN — PIPERACILLIN SODIUM AND TAZOBACTAM SODIUM 4.5 G: 4; .5 INJECTION, POWDER, LYOPHILIZED, FOR SOLUTION INTRAVENOUS at 08:09

## 2020-09-07 RX ADMIN — PIPERACILLIN SODIUM AND TAZOBACTAM SODIUM 4.5 G: 4; .5 INJECTION, POWDER, LYOPHILIZED, FOR SOLUTION INTRAVENOUS at 11:09

## 2020-09-07 RX ADMIN — PIPERACILLIN SODIUM AND TAZOBACTAM SODIUM 4.5 G: 4; .5 INJECTION, POWDER, LYOPHILIZED, FOR SOLUTION INTRAVENOUS at 05:09

## 2020-09-07 NOTE — PROGRESS NOTES
[facility] Hospital Medicine  Progress Note    Patient Name: Garret Munguia  MRN: [id number]  Patient Class: IP- Inpatient   Admission Date: 9/4/2020  Length of Stay: 2 days  Attending Physician: Anjel Bernstein MD  Primary Care Provider: Primary Doctor No        Subjective:     Principal Problem:Diabetic infection of right foot        HPI:  Garret Munguia is a 50-year-old male who presents to the emergency room for evaluation of wound infection to the right foot.  He reports his wound onset approximately 1-2 weeks ago.  He denies any known injury or trauma.  He denies fever chills.  No known sick contacts or travel.  He describes minimal pain to the right foot wound.  No aggravating alleviating factors.  Previous medical history includes type 2 diabetes.  ER workup:  CBC with mild anemia.  CMP with glucose of 287.  X-ray of the right foot was negative for osteomyelitis.  Wound and blood cultures are pending.  Lactic acid was within normal limits.  Patient was given vancomycin and Zosyn in the ER.  Patient will be admitted to Hospital Medicine for observation and management.  Will consult podiatry and wound care.    Overview/Hospital Course:  No notes on file    Interval History:  Seen and examined.  Pt denies any pain to right foot.  Denies any chest pain or SOB.  No nausea or vomiting.  Culture growing Proteus-vanc discontinued.  Glucose appears improved.  Wound VAC is in place.    Review of Systems   Constitutional: Negative for chills and fever.   HENT: Negative for trouble swallowing.    Respiratory: Negative for cough and shortness of breath.    Cardiovascular: Negative for chest pain.   Gastrointestinal: Negative for nausea and vomiting.   Genitourinary: Negative for dysuria.   Musculoskeletal: Negative for arthralgias, joint swelling and myalgias.   Skin: Positive for wound.   Neurological: Negative for weakness and headaches.   Psychiatric/Behavioral: Negative for confusion.     Objective:      Vital Signs (Most Recent):  Temp: 97.9 °F (36.6 °C) (09/07/20 0848)  Pulse: 79 (09/07/20 0929)  Resp: 20 (09/07/20 0929)  BP: 136/77 (09/07/20 0848)  SpO2: 96 % (09/07/20 0929) Vital Signs (24h Range):  Temp:  [97.7 °F (36.5 °C)-99.9 °F (37.7 °C)] 97.9 °F (36.6 °C)  Pulse:  [77-93] 79  Resp:  [16-20] 20  SpO2:  [93 %-98 %] 96 %  BP: (136-169)/(77-85) 136/77     Weight: 98.2 kg (216 lb 7.9 oz)  Body mass index is 30.19 kg/m².    Intake/Output Summary (Last 24 hours) at 9/7/2020 1030  Last data filed at 9/7/2020 0600  Gross per 24 hour   Intake 650 ml   Output 2150 ml   Net -1500 ml      Physical Exam  Vitals signs and nursing note reviewed.   Constitutional:       Appearance: Normal appearance. He is normal weight.   HENT:      Head: Normocephalic and atraumatic.      Mouth/Throat:      Mouth: Mucous membranes are moist.      Pharynx: Oropharynx is clear.   Eyes:      Extraocular Movements: Extraocular movements intact.      Conjunctiva/sclera: Conjunctivae normal.      Pupils: Pupils are equal, round, and reactive to light.   Neck:      Musculoskeletal: Normal range of motion and neck supple.   Cardiovascular:      Rate and Rhythm: Normal rate and regular rhythm.      Pulses: Normal pulses.      Heart sounds: Normal heart sounds.   Pulmonary:      Effort: Pulmonary effort is normal.      Breath sounds: Normal breath sounds.   Abdominal:      General: Abdomen is flat. Bowel sounds are normal.      Palpations: Abdomen is soft.   Musculoskeletal: Normal range of motion.   Skin:     General: Skin is warm.      Capillary Refill: Capillary refill takes less than 2 seconds.      Comments: Wound VAC to right medial aspect of forefoot.  Scant drainage noted in tubing.   Neurological:      Mental Status: He is alert.   Psychiatric:         Mood and Affect: Mood normal.             Significant Labs:   CBC:   Recent Labs   Lab 09/06/20  0444 09/07/20  0424   WBC 8.92 9.33   HGB 11.3* 10.9*   HCT 37.3* 35.4*    076      CMP:   Recent Labs   Lab 09/06/20  0444 09/07/20  0424    137   K 3.9 4.0    101   CO2 28 27   * 257*   BUN 11 15   CREATININE 1.1 1.0   CALCIUM 8.9 9.1   PROT 6.5 6.5   ALBUMIN 2.6* 2.5*   BILITOT 0.4 0.4   ALKPHOS 71 68   AST 13 16   ALT 13 17   ANIONGAP 9 9   EGFRNONAA >60 >60       Significant Imaging: I have reviewed all pertinent imaging results/findings within the past 24 hours.      Assessment/Plan:      * Diabetic infection of right foot  Preliminary cultures growing Proteus- DC vanc, continue Zosyn.  ID is following and will make final antibiotic recommendations based on final cultures.  Appreciate recommendations.  Podiatrist consulted.  S/P extensive debridement.  Wound care in place.  Will need wound VAC on discharge with home health, as well as close podiatry follow-up  Need tighter glycemic control, discussed with Pt the importance of diabetic care compliance.      Foot ulceration, right, with unspecified severity  Acute problem-S/P extensive debridement.  Will need wound VAC-nursing to perform wound care until wound VAC replaced.  Continue IV Abx.  Aggressive glucose control  MRI negative for osteomyelitis, however small joint effusion.  Elevated CRP.  Will likely need extended abx therapy.        Type 2 diabetes mellitus with foot ulcer, without long-term current use of insulin  Acute on chronic problem  Patient's FSGs are not controlled on current hypoglycemics.   Hemoglobin A1c pending  Most recent fingerstick glucose reviewed-   Recent Labs   Lab 09/06/20  1133 09/06/20  1711 09/06/20  2228 09/07/20  0614   POCTGLUCOSE 323* 180* 221* 233*     Current correctional scale  Medium  Increase anti-hyperglycemic dose as follows-   Antihyperglycemics (From admission, onward)    Start     Stop Route Frequency Ordered    09/06/20 2100  insulin detemir U-100 pen 15 Units      -- SubQ Nightly 09/06/20 0756    09/06/20 0815  insulin aspart U-100 pen 3 Units      -- SubQ 3 times daily  with meals 09/06/20 0708    09/04/20 2345  insulin aspart U-100 pen 1-10 Units      -- SubQ Before meals & nightly PRN 09/04/20 2345        Hold Oral hypoglycemics while patient is in the hospital.  Discussed the importance of glycemic control.  Will need new orders for hypoglycemic agent upon discharge-we discussed insulin as well as oral regimen.  Consult Diabetic educator.  Will need glucose meter upon discharge.      VTE Risk Mitigation (From admission, onward)         Ordered     IP VTE LOW RISK PATIENT  Once      09/04/20 2345     Place sequential compression device  Until discontinued      09/04/20 2345     Place CHRISTOPHER hose  Until discontinued      09/04/20 2345                Discharge Planning   MELISA: 9/8/20     Code Status: Full Code   Is the patient medically ready for discharge?:     Reason for patient still in hospital (select all that apply): Laboratory test and Treatment  Discharge Plan A: Home Health                  Cathy Menjivar NP  Department of Hospital Medicine   [facility]

## 2020-09-07 NOTE — RESPIRATORY THERAPY
09/06/20 1901   Patient Assessment/Suction   Level of Consciousness (AVPU) alert   Respiratory Effort Unlabored   PRE-TX-O2   O2 Device (Oxygen Therapy) room air   SpO2 96 %   Pulse Oximetry Type Intermittent   $ Pulse Oximetry - Multiple Charge Pulse Oximetry - Multiple

## 2020-09-07 NOTE — PLAN OF CARE
Plan of care reviewed with patient. Safety precautions maintained. Pt remains free from injury. Cardiac monitoring maintained. Glucose monitoring maintained. Wound vac in place. Dsg clean, dry and intact. No c/o pain. Ambulates stand by assist. Urinal within reach. IV abx maintained.  TEDs on for VTE prevention. Frequent checks performed q2 hours. Vital signs stable. ATmax 100.1. AAOx4. Bed locked and low. Siderails raised x2. Non-skid socks on. Call light within reach.

## 2020-09-07 NOTE — RESPIRATORY THERAPY
09/07/20 0929   Patient Assessment/Suction   Level of Consciousness (AVPU) alert   Respiratory Effort Normal;Unlabored   Expansion/Accessory Muscles/Retractions no use of accessory muscles;no retractions;expansion symmetric   All Lung Fields Breath Sounds clear;equal bilaterally   Rhythm/Pattern, Respiratory unlabored;pattern regular;depth regular   Cough Frequency no cough   PRE-TX-O2   O2 Device (Oxygen Therapy) room air   SpO2 96 %   Pulse Oximetry Type Intermittent   $ Pulse Oximetry - Multiple Charge Pulse Oximetry - Multiple   Pulse 79   Resp 20   Positioning HOB elevated 30 degrees

## 2020-09-07 NOTE — NURSING
Wound vac dressing change done as ordered. More bone exposed. Notified Dr. Medrano regarding more bone being exposed. Instructions to apply a small amount of adaptic over bone and white foam to protect bone and covered the area with black foam. Applied occlusive dressing over intact skin to bridge the suction piece to the top of the foot. Pt tolerated well.                    Facial Rash

## 2020-09-07 NOTE — ASSESSMENT & PLAN NOTE
Acute on chronic problem  Patient's FSGs are not controlled on current hypoglycemics.   Hemoglobin A1c pending  Most recent fingerstick glucose reviewed-   Recent Labs   Lab 09/06/20  1133 09/06/20  1711 09/06/20  2228 09/07/20  0614   POCTGLUCOSE 323* 180* 221* 233*     Current correctional scale  Medium  Increase anti-hyperglycemic dose as follows-   Antihyperglycemics (From admission, onward)    Start     Stop Route Frequency Ordered    09/06/20 2100  insulin detemir U-100 pen 15 Units      -- SubQ Nightly 09/06/20 0756    09/06/20 0815  insulin aspart U-100 pen 3 Units      -- SubQ 3 times daily with meals 09/06/20 0708    09/04/20 2345  insulin aspart U-100 pen 1-10 Units      -- SubQ Before meals & nightly PRN 09/04/20 2345        Hold Oral hypoglycemics while patient is in the hospital.  Discussed the importance of glycemic control.  Will need new orders for hypoglycemic agent upon discharge-we discussed insulin as well as oral regimen.  Consult Diabetic educator.  Will need glucose meter upon discharge.

## 2020-09-07 NOTE — SUBJECTIVE & OBJECTIVE
Interval History:  Seen and examined.  Pt denies any pain to right foot.  Denies any chest pain or SOB.  No nausea or vomiting.  Culture growing Proteus-vanc discontinued.  Glucose appears improved.  Wound VAC is in place.    Review of Systems   Constitutional: Negative for chills and fever.   HENT: Negative for trouble swallowing.    Respiratory: Negative for cough and shortness of breath.    Cardiovascular: Negative for chest pain.   Gastrointestinal: Negative for nausea and vomiting.   Genitourinary: Negative for dysuria.   Musculoskeletal: Negative for arthralgias, joint swelling and myalgias.   Skin: Positive for wound.   Neurological: Negative for weakness and headaches.   Psychiatric/Behavioral: Negative for confusion.     Objective:     Vital Signs (Most Recent):  Temp: 97.9 °F (36.6 °C) (09/07/20 0848)  Pulse: 79 (09/07/20 0929)  Resp: 20 (09/07/20 0929)  BP: 136/77 (09/07/20 0848)  SpO2: 96 % (09/07/20 0929) Vital Signs (24h Range):  Temp:  [97.7 °F (36.5 °C)-99.9 °F (37.7 °C)] 97.9 °F (36.6 °C)  Pulse:  [77-93] 79  Resp:  [16-20] 20  SpO2:  [93 %-98 %] 96 %  BP: (136-169)/(77-85) 136/77     Weight: 98.2 kg (216 lb 7.9 oz)  Body mass index is 30.19 kg/m².    Intake/Output Summary (Last 24 hours) at 9/7/2020 1030  Last data filed at 9/7/2020 0600  Gross per 24 hour   Intake 650 ml   Output 2150 ml   Net -1500 ml      Physical Exam  Vitals signs and nursing note reviewed.   Constitutional:       Appearance: Normal appearance. He is normal weight.   HENT:      Head: Normocephalic and atraumatic.      Mouth/Throat:      Mouth: Mucous membranes are moist.      Pharynx: Oropharynx is clear.   Eyes:      Extraocular Movements: Extraocular movements intact.      Conjunctiva/sclera: Conjunctivae normal.      Pupils: Pupils are equal, round, and reactive to light.   Neck:      Musculoskeletal: Normal range of motion and neck supple.   Cardiovascular:      Rate and Rhythm: Normal rate and regular rhythm.      Pulses:  Normal pulses.      Heart sounds: Normal heart sounds.   Pulmonary:      Effort: Pulmonary effort is normal.      Breath sounds: Normal breath sounds.   Abdominal:      General: Abdomen is flat. Bowel sounds are normal.      Palpations: Abdomen is soft.   Musculoskeletal: Normal range of motion.   Skin:     General: Skin is warm.      Capillary Refill: Capillary refill takes less than 2 seconds.      Comments: Wound VAC to right medial aspect of forefoot.  Scant drainage noted in tubing.   Neurological:      Mental Status: He is alert.   Psychiatric:         Mood and Affect: Mood normal.             Significant Labs:   CBC:   Recent Labs   Lab 09/06/20  0444 09/07/20  0424   WBC 8.92 9.33   HGB 11.3* 10.9*   HCT 37.3* 35.4*    344     CMP:   Recent Labs   Lab 09/06/20 0444 09/07/20  0424    137   K 3.9 4.0    101   CO2 28 27   * 257*   BUN 11 15   CREATININE 1.1 1.0   CALCIUM 8.9 9.1   PROT 6.5 6.5   ALBUMIN 2.6* 2.5*   BILITOT 0.4 0.4   ALKPHOS 71 68   AST 13 16   ALT 13 17   ANIONGAP 9 9   EGFRNONAA >60 >60       Significant Imaging: I have reviewed all pertinent imaging results/findings within the past 24 hours.

## 2020-09-07 NOTE — PLAN OF CARE
Patient AAOx4.  VSS, low grade temps overnight.  Tele # [id number] monitored.  Dressing/bandage to R foot is CDI.  Patient does not report any pain.  Wound vac in place with scant output.  IV ABX infused.  Blood glucose monitored and insulin given as ordered.  Urinal at bedside. No new complaints noted at this time.  Needs addressed, safety maintained.  Monitoring.

## 2020-09-07 NOTE — PROGRESS NOTES
Food & Nutrition  Education    Diet Education: Carbohydrate counting  Time Spent: 15 minutes  Learners: Pt      Nutrition Education provided with handouts:  Type 2 Diabetes Nutrition Therapy      Comments: PTA, pt reports checking blood sugar rarely, but watched what he ate to intentionally lose weight. Pt states after he leaves hospital, his choices about eating will be better and will check blood sugar more often. Verbalized understanding of education and is eager to make changes. Had no additional questions.    Malnutrition Assessment:   NFPE completed on 9/7, no wasting seen. Pt is well nourished.      All questions and concerns answered. Dietitian's contact information provided.       Follow-Up: Yes    Please Re-consult as needed        Thanks!

## 2020-09-07 NOTE — PROGRESS NOTES
Garret Munguia [id number] is a 50 y.o. male who had been consulted for vancomycin dosing.    Vancomycin has been discontinued.  Pharmacy consult for vancomycin dosing in no longer required.      Thank you for allowing us to participate in this patient's care.     Jennie Danielle

## 2020-09-07 NOTE — ASSESSMENT & PLAN NOTE
Preliminary cultures growing Proteus- DC vanc, continue Zosyn.  ID is following and will make final antibiotic recommendations based on final cultures.  Appreciate recommendations.  Podiatrist consulted.  S/P extensive debridement.  Wound care in place.  Will need wound VAC on discharge with home health, as well as close podiatry follow-up  Need tighter glycemic control, discussed with Pt the importance of diabetic care compliance.

## 2020-09-08 VITALS
TEMPERATURE: 99 F | HEART RATE: 84 BPM | DIASTOLIC BLOOD PRESSURE: 74 MMHG | SYSTOLIC BLOOD PRESSURE: 146 MMHG | RESPIRATION RATE: 20 BRPM | OXYGEN SATURATION: 95 % | BODY MASS INDEX: 30.31 KG/M2 | WEIGHT: 216.5 LBS | HEIGHT: 71 IN

## 2020-09-08 DIAGNOSIS — L97.509 ULCER OF FOOT, UNSPECIFIED LATERALITY, UNSPECIFIED ULCER STAGE: Primary | ICD-10-CM

## 2020-09-08 PROBLEM — E11.42 DIABETIC POLYNEUROPATHY ASSOCIATED WITH TYPE 2 DIABETES MELLITUS: Status: ACTIVE | Noted: 2020-09-08

## 2020-09-08 LAB
ALBUMIN SERPL BCP-MCNC: 2.5 G/DL (ref 3.5–5.2)
ALP SERPL-CCNC: 82 U/L (ref 55–135)
ALT SERPL W/O P-5'-P-CCNC: 25 U/L (ref 10–44)
ANION GAP SERPL CALC-SCNC: 10 MMOL/L (ref 8–16)
AST SERPL-CCNC: 20 U/L (ref 10–40)
BASOPHILS # BLD AUTO: 0.03 K/UL (ref 0–0.2)
BASOPHILS NFR BLD: 0.3 % (ref 0–1.9)
BILIRUB SERPL-MCNC: 0.3 MG/DL (ref 0.1–1)
BUN SERPL-MCNC: 16 MG/DL (ref 6–20)
CALCIUM SERPL-MCNC: 9.8 MG/DL (ref 8.7–10.5)
CHLORIDE SERPL-SCNC: 101 MMOL/L (ref 95–110)
CO2 SERPL-SCNC: 29 MMOL/L (ref 23–29)
CREAT SERPL-MCNC: 1.1 MG/DL (ref 0.5–1.4)
DIFFERENTIAL METHOD: ABNORMAL
EOSINOPHIL # BLD AUTO: 0.2 K/UL (ref 0–0.5)
EOSINOPHIL NFR BLD: 2.4 % (ref 0–8)
ERYTHROCYTE [DISTWIDTH] IN BLOOD BY AUTOMATED COUNT: 11.3 % (ref 11.5–14.5)
EST. GFR  (AFRICAN AMERICAN): >60 ML/MIN/1.73 M^2
EST. GFR  (NON AFRICAN AMERICAN): >60 ML/MIN/1.73 M^2
GLUCOSE SERPL-MCNC: 213 MG/DL (ref 70–110)
HCT VFR BLD AUTO: 38.9 % (ref 40–54)
HGB BLD-MCNC: 11.7 G/DL (ref 14–18)
IMM GRANULOCYTES # BLD AUTO: 0.04 K/UL (ref 0–0.04)
IMM GRANULOCYTES NFR BLD AUTO: 0.4 % (ref 0–0.5)
LYMPHOCYTES # BLD AUTO: 2.2 K/UL (ref 1–4.8)
LYMPHOCYTES NFR BLD: 22.7 % (ref 18–48)
MAGNESIUM SERPL-MCNC: 2 MG/DL (ref 1.6–2.6)
MCH RBC QN AUTO: 27.3 PG (ref 27–31)
MCHC RBC AUTO-ENTMCNC: 30.1 G/DL (ref 32–36)
MCV RBC AUTO: 91 FL (ref 82–98)
MONOCYTES # BLD AUTO: 0.9 K/UL (ref 0.3–1)
MONOCYTES NFR BLD: 9.8 % (ref 4–15)
NEUTROPHILS # BLD AUTO: 6.1 K/UL (ref 1.8–7.7)
NEUTROPHILS NFR BLD: 64.4 % (ref 38–73)
NRBC BLD-RTO: 0 /100 WBC
PHOSPHATE SERPL-MCNC: 4.7 MG/DL (ref 2.7–4.5)
PLATELET # BLD AUTO: 384 K/UL (ref 150–350)
PMV BLD AUTO: 8.7 FL (ref 9.2–12.9)
POCT GLUCOSE: 166 MG/DL (ref 70–110)
POCT GLUCOSE: 187 MG/DL (ref 70–110)
POCT GLUCOSE: 276 MG/DL (ref 70–110)
POTASSIUM SERPL-SCNC: 4.5 MMOL/L (ref 3.5–5.1)
PROT SERPL-MCNC: 7 G/DL (ref 6–8.4)
RBC # BLD AUTO: 4.29 M/UL (ref 4.6–6.2)
SODIUM SERPL-SCNC: 140 MMOL/L (ref 136–145)
WBC # BLD AUTO: 9.52 K/UL (ref 3.9–12.7)

## 2020-09-08 PROCEDURE — 83735 ASSAY OF MAGNESIUM: CPT

## 2020-09-08 PROCEDURE — 85025 COMPLETE CBC W/AUTO DIFF WBC: CPT

## 2020-09-08 PROCEDURE — 25000003 PHARM REV CODE 250: Performed by: NURSE PRACTITIONER

## 2020-09-08 PROCEDURE — 97605 NEG PRS WND THER DME<=50SQCM: CPT

## 2020-09-08 PROCEDURE — 94761 N-INVAS EAR/PLS OXIMETRY MLT: CPT

## 2020-09-08 PROCEDURE — 63600175 PHARM REV CODE 636 W HCPCS: Performed by: NURSE PRACTITIONER

## 2020-09-08 PROCEDURE — 94760 N-INVAS EAR/PLS OXIMETRY 1: CPT

## 2020-09-08 PROCEDURE — 36415 COLL VENOUS BLD VENIPUNCTURE: CPT

## 2020-09-08 PROCEDURE — 84100 ASSAY OF PHOSPHORUS: CPT

## 2020-09-08 PROCEDURE — 80053 COMPREHEN METABOLIC PANEL: CPT

## 2020-09-08 RX ORDER — DEXTROSE 4 G
1 TABLET,CHEWABLE ORAL
Qty: 1 EACH | Refills: 0 | Status: SHIPPED | OUTPATIENT
Start: 2020-09-08 | End: 2021-09-08

## 2020-09-08 RX ORDER — GABAPENTIN 100 MG/1
100 CAPSULE ORAL 3 TIMES DAILY
Qty: 90 CAPSULE | Refills: 0 | Status: SHIPPED | OUTPATIENT
Start: 2020-09-08 | End: 2020-09-08 | Stop reason: SDUPTHER

## 2020-09-08 RX ORDER — GABAPENTIN 100 MG/1
100 CAPSULE ORAL 3 TIMES DAILY
Status: DISCONTINUED | OUTPATIENT
Start: 2020-09-08 | End: 2020-09-08 | Stop reason: HOSPADM

## 2020-09-08 RX ORDER — LANCETS
1 EACH MISCELLANEOUS
Qty: 100 EACH | Refills: 0 | Status: SHIPPED | OUTPATIENT
Start: 2020-09-08 | End: 2020-10-16 | Stop reason: SDUPTHER

## 2020-09-08 RX ORDER — GABAPENTIN 100 MG/1
100 CAPSULE ORAL 3 TIMES DAILY
Qty: 90 CAPSULE | Refills: 0 | Status: SHIPPED | OUTPATIENT
Start: 2020-09-08 | End: 2021-02-11 | Stop reason: SDUPTHER

## 2020-09-08 RX ORDER — LEVOFLOXACIN 750 MG/1
750 TABLET ORAL DAILY
Qty: 30 TABLET | Refills: 0 | Status: SHIPPED | OUTPATIENT
Start: 2020-09-08 | End: 2020-09-25 | Stop reason: SDUPTHER

## 2020-09-08 RX ORDER — METFORMIN HYDROCHLORIDE 500 MG/1
500 TABLET ORAL 2 TIMES DAILY WITH MEALS
Qty: 60 TABLET | Refills: 0 | Status: SHIPPED | OUTPATIENT
Start: 2020-09-08 | End: 2020-10-16 | Stop reason: SDUPTHER

## 2020-09-08 RX ADMIN — INSULIN ASPART 3 UNITS: 100 INJECTION, SOLUTION INTRAVENOUS; SUBCUTANEOUS at 11:09

## 2020-09-08 RX ADMIN — PIPERACILLIN SODIUM AND TAZOBACTAM SODIUM 4.5 G: 4; .5 INJECTION, POWDER, LYOPHILIZED, FOR SOLUTION INTRAVENOUS at 09:09

## 2020-09-08 RX ADMIN — INSULIN ASPART 3 UNITS: 100 INJECTION, SOLUTION INTRAVENOUS; SUBCUTANEOUS at 09:09

## 2020-09-08 RX ADMIN — INSULIN ASPART 6 UNITS: 100 INJECTION, SOLUTION INTRAVENOUS; SUBCUTANEOUS at 05:09

## 2020-09-08 RX ADMIN — GABAPENTIN 100 MG: 100 CAPSULE ORAL at 01:09

## 2020-09-08 RX ADMIN — INSULIN ASPART 3 UNITS: 100 INJECTION, SOLUTION INTRAVENOUS; SUBCUTANEOUS at 05:09

## 2020-09-08 NOTE — CONSULTS
Diabetes education packet given to patient. Explained the importance of close glucose control with a diabetic foot ulcer. Pt verbalized understanding regarding the same. Explained the importance of keeping pressure of the right foot ball of the foot area and to keep the boot in place and using his heel to walk. Pt reports his glucometer broke when he dropped it a couple of months ago. A new glucometer has been ordered for this patient. Noted a rash to the patients right cheek. Pt reports this rash does itch. Encouraged pt not to scratch this rash. Notified patients primary nurse.

## 2020-09-08 NOTE — PLAN OF CARE
Garrick spoke with  at [facility], the order is not ready. He will beltran it Urgent.  Garrick will follow-up wit [facility] at [phone].       09/08/20 0902   Discharge Reassessment   Assessment Type Discharge Planning Reassessment   Provided patient/caregiver education on the expected discharge date and the discharge plan Yes

## 2020-09-08 NOTE — PLAN OF CARE
[facility] home---9/8/2020 1:41:35 PM Accepted  Felipa [email or web address]       09/08/20 1342   Post-Acute Status   Post-Acute Authorization Home Health   Home Health Status Set-up Complete

## 2020-09-08 NOTE — PROGRESS NOTES
Consult Note  Infectious Disease    Reason for Consult:  Diabetic foot infection    HPI: Garret Munguia is a   50 y.o. male with uncontrolled diabetes, presented to the emergency room last night for wound infection right foot.  He has peripheral neuropathy and did not recognize any injury.  The blister had been present for 2 and half weeks.  He had been cleaning with peroxide and applying Neosporin.  Plain film was negative for osteomyelitis, wound and blood cultures were obtained, vancomycin and Zosyn were begun in the emergency room.  An MRI was performed which did not show any osteomyelitis but did show a very small effusion in the MTP joint.  He was seen by Dr. Bianca Medrano and she debrided the blister tissue and subcutaneous tissue down to the fascia  and the joint capsule.  The wound had a foul odor but no abscess was encountered.  A wound VAC was ordered.    9/7: cultures from admit have Proteus(pansensitive) and a second GNR. Unsure if home wound vac has been arranged. He has no complaints.     EXAM & DIAGNOSTICS REVIEWED:   Vitals:     Temp:  [97.8 °F (36.6 °C)-100.1 °F (37.8 °C)]   Temp: 100.1 °F (37.8 °C) (09/07/20 1706)  Pulse: 88 (09/07/20 1706)  Resp: 20 (09/07/20 1706)  BP: (!) 140/79 (09/07/20 1706)  SpO2: 95 % (09/07/20 1706)    Intake/Output Summary (Last 24 hours) at 9/7/2020 1905  Last data filed at 9/7/2020 1200  Gross per 24 hour   Intake 1350 ml   Output 1850 ml   Net -500 ml       General:  In NAD. Alert and attentive, cooperative, comfortable      Wound:    pre debridement    I did review a photograph of his foot post debridement.  And it was packed with Nu Gauze.  I did not disturb the fresh post debridement bandage.  He reports that the debridement did not cause him any pain.    9/7: wound vac in place, not disturbed        Neuro:              Alert, attentive, speech fluent, face symmetric, moves all extremities, no focal weakness. Ambulatory  Psych:  Calm, cooperative  Lymphatic:         Extrem: No edema, erythema, phlebitis, cellulitis, warm and well perfused  VAD:       Isolation:  None    Lines/Tubes/Drains:    General Labs reviewed:  Recent Labs   Lab 09/05/20 0605 09/06/20 0444 09/07/20 0424   WBC 8.31 8.92 9.33   HGB 11.5* 11.3* 10.9*   HCT 37.8* 37.3* 35.4*    335 344       Recent Labs   Lab 09/05/20 0605 09/06/20 0444 09/07/20 0424    137 137   K 4.2 3.9 4.0    100 101   CO2 29 28 27   BUN 10 11 15   CREATININE 0.9 1.1 1.0   CALCIUM 8.9 8.9 9.1   PROT 6.7 6.5 6.5   BILITOT 0.5 0.4 0.4   ALKPHOS 69 71 68   ALT 11 13 17   AST 11 13 16           Micro:  Microbiology Results (last 7 days)     Procedure Component Value Units Date/Time    Aerobic culture (Specify Source) **CANNOT BE ORDERED AS STAT** [[id number]]  (Abnormal)  (Susceptibility) Collected: 09/04/20 1924    Order Status: Completed Specimen: Wound from Foot, Right Updated: 09/07/20 1156     Aerobic Bacterial Culture PROTEUS MIRABILIS  Moderate        GRAM NEGATIVE DARSHAN  Few  Identification and susceptibility pending      Blood culture x two cultures. Draw prior to antibiotics. [[id number]] Collected: 09/04/20 1914    Order Status: Completed Specimen: Blood from Antecubital, Right Arm Updated: 09/07/20 0613     Blood Culture, Routine No Growth to date      No Growth to date      No Growth to date    Narrative:      Aerobic and anaerobic    Blood culture x two cultures. Draw prior to antibiotics. [[id number]] Collected: 09/04/20 1914    Order Status: Completed Specimen: Blood from Antecubital, Left Arm Updated: 09/07/20 0613     Blood Culture, Routine No Growth to date      No Growth to date      No Growth to date    Narrative:      Aerobic and anaerobic    Aerobic culture [[id number]]     Order Status: Canceled Specimen: Wound from Foot, Right         Imaging Reviewed:   X-ray of the ft   MRI of the forefoot  1. Small nonhealing diabetic ulcer within the subcutaneous tissues adjacent to the 1st MTP joint with  associated cellulitis.  2. No MRI evidence to suggest an underlying focus of osteomyelitis.  3. Small effusion of the 1st MTP joint.     Cardiology:    IMPRESSION & PLAN   1. Diabetic foot infection, requiring full-thickness debridement, joint capsule exposed, negative MRI for osteomyelitis. Proteus and a second GNR on culture  2. Diabetes with hyperglycemia, inadequate knowledge base and habits for good outcomes but motivated to learn  3. Diabetes with polyneuropathy    Last tetanus unknown      Recommendations:  If second GNR is sensitive to quinolones, recommend levaquin 750 mg po daily x 3-4 weeks  F/u per Dr. Medrano  Please call if needed, thanks       Medical Decision Making during this encounter was  [_] Low Complexity  [x_] Moderate Complexity  [  ] High Complexity

## 2020-09-08 NOTE — CARE UPDATE
09/08/20 0735   Patient Assessment/Suction   Level of Consciousness (AVPU) alert   PRE-TX-O2   O2 Device (Oxygen Therapy) room air   SpO2 95 %   Pulse Oximetry Type Intermittent   $ Pulse Oximetry - Multiple Charge Pulse Oximetry - Multiple   Pulse 84   Resp 18

## 2020-09-08 NOTE — PLAN OF CARE
Garrick spoke with the manager-Bebe about the wound vac being ordered on Saturday and no one reviewed the order. Bebe realized they had all the information after I went over the packet with her. Bebe will get Berta to complete the order and send it out after they contact the insurance. PT's insurance is a national account in [place].   Cm will continue to follow-up.    2:34pm  Cm received a message form Chavo ([phone] with [facility].  He will deliver the wound vac at 5:00 pm.   Cm informed Amrita, RN-Charge Nurse.       09/08/20 1251   Discharge Reassessment   Assessment Type Discharge Planning Reassessment   Provided patient/caregiver education on the expected discharge date and the discharge plan Yes

## 2020-09-08 NOTE — PLAN OF CARE
Patient AAO X 4. Patient free from injury during shift. Pain managed pharmacologically. Provided full relief. Patient free from N/V during shift. IV access has IV ABX running. Patient placed on cardiac monitoring. NSR Remained afebrile during shift. Pt on room air  tolerating well. Wound vac in place draining serosanguineous drainage. Pt able to walk to restroom w/ stand by assistance. Restroom offered. Repositioned as needed. Safety maintained with bed alarm. Bed in lowest position, call light and personal items within reach. Patient verbalized understanding of care. Will continue to monitor with every 2 hour rounding.

## 2020-09-08 NOTE — PLAN OF CARE
Garrick scheduled pt's appointments.  Garrick spoke with Ally at the Diabetic center, she requested labs, H&P and referral to fax to [phone].  She will schedule pt's appointment and contact pt with appointment.  Garrick spoke with Alix at [facility], she will schedule pt with an appointment on Friday.  Pt is pending Wound Vac and HH orders, before discharging to home.       09/08/20 6058   Discharge Reassessment   Assessment Type Discharge Planning Reassessment   Provided patient/caregiver education on the expected discharge date and the discharge plan Yes   Do you have any problems affording any of your prescribed medications? No

## 2020-09-08 NOTE — PLAN OF CARE
SW met with patient at bedside regarding home health. Patient signed patient choice disclosure choosing [facility].  [facility] and acceptance.       09/08/20 1042   Post-Acute Status   Post-Acute Authorization Home Health   Home Health Status Referrals Sent   Patient choice form signed by patient/caregiver List with quality metrics by geographic area provided

## 2020-09-08 NOTE — PLAN OF CARE
Pt will be cleared for discharge when he gets his wound vac form [employer] at 5pm.  Please do not discharge pt without wound vac.  Pt is cleared form CM for D/C.       09/08/20 1534   Final Note   Assessment Type Final Discharge Note   Anticipated Discharge Disposition Home-Health   Hospital Follow Up  Appt(s) scheduled? Yes

## 2020-09-08 NOTE — RESPIRATORY THERAPY
09/07/20 1941   Patient Assessment/Suction   Level of Consciousness (AVPU) alert   Respiratory Effort Unlabored   PRE-TX-O2   O2 Device (Oxygen Therapy) room air   SpO2 97 %   Pulse Oximetry Type Intermittent   $ Pulse Oximetry - Multiple Charge Pulse Oximetry - Multiple

## 2020-09-08 NOTE — HOSPITAL COURSE
Pt was monitor closely during his stay.  He was evaluated by podiatry on 09/05 and underwent bedside debridement with extensive debridement required moving exposed joint capsule.  Underwent MRI which was negative for osteomyelitis.  Infectious disease was consulted.  He was continued on broad-spectrum Abx.  Cultures were followed.  Wound VAC was initiated to left medial foot wound per podiatrist orders.  Hemoglobin A1c of 13 revealed significantly uncontrolled DM2.  Pt was started on basal/bolus insulin regimen with improvement glucose.  Diabetic educator was consulted, as well as dietitian who provided verbal and written education on diabetic diet.  Cultures grew Proteus Abx were deescalated IV Zosyn.   was consulted to facilitate outpatient PCP, diabetes center appointments, as well as some health resources ongoing wound care and diabetic management. Wound vac ordered and placed prior to DC. He is stable for DC and cleared for DC from ID standpoint.     PE Chest CTA heart RRR  Muscl: left foot dressing intact with wound vac in place.   Discussed with patient importance of follow up. Diabetic equipment ordered for home monitoring.

## 2020-09-08 NOTE — CARE UPDATE
Patient okay to be discharged from a podiatry standpoint with follow up this Friday in outpatient wound clinic with myself- referral has been placed.    Order for [facility] wound VAC has been signed and submitted for home use.     Case management to set up home health for patient to go home with for wound VAC change twice weekly.    Long-term oral antibiotics per Infectious Disease recommendations.  Cultures growing Proteus and GNR.    Patient needs to remain in the Cam boot when discharged home with minimal weight-bearing while in Cam boot.  Additionally, patient should not get foot wet.  All of this has been discussed with patient in detail.    Please contact me with any additional questions.

## 2020-09-09 LAB
BACTERIA SPEC AEROBE CULT: ABNORMAL
BACTERIA SPEC AEROBE CULT: ABNORMAL

## 2020-09-09 PROCEDURE — G0180 PR HOME HEALTH MD CERTIFICATION: ICD-10-PCS | Mod: ,,, | Performed by: HOSPITALIST

## 2020-09-09 PROCEDURE — G0180 MD CERTIFICATION HHA PATIENT: HCPCS | Mod: ,,, | Performed by: HOSPITALIST

## 2020-09-09 NOTE — DISCHARGE SUMMARY
[facility] Hospital Medicine  Discharge Summary      Patient Name: Garret Munguia  MRN: [id number]  Admission Date: 9/4/2020  Hospital Length of Stay: 3 days  Discharge Date and Time: 9/8/2020  6:33 PM  Attending Physician: No att. providers found   Discharging Provider: Felipa Yu NP  Primary Care Provider: Primary Doctor No      HPI:   Garret Munguia is a 50-year-old male who presents to the emergency room for evaluation of wound infection to the right foot.  He reports his wound onset approximately 1-2 weeks ago.  He denies any known injury or trauma.  He denies fever chills.  No known sick contacts or travel.  He describes minimal pain to the right foot wound.  No aggravating alleviating factors.  Previous medical history includes type 2 diabetes.  ER workup:  CBC with mild anemia.  CMP with glucose of 287.  X-ray of the right foot was negative for osteomyelitis.  Wound and blood cultures are pending.  Lactic acid was within normal limits.  Patient was given vancomycin and Zosyn in the ER.  Patient will be admitted to Hospital Medicine for observation and management.  Will consult podiatry and wound care.    * No surgery found *      Hospital Course:   Pt was monitor closely during his stay.  He was evaluated by podiatry on 09/05 and underwent bedside debridement with extensive debridement required moving exposed joint capsule.  Underwent MRI which was negative for osteomyelitis.  Infectious disease was consulted.  He was continued on broad-spectrum Abx.  Cultures were followed.  Wound VAC was initiated to left medial foot wound per podiatrist orders.  Hemoglobin A1c of 13 revealed significantly uncontrolled DM2.  Pt was started on basal/bolus insulin regimen with improvement glucose.  Diabetic educator was consulted, as well as dietitian who provided verbal and written education on diabetic diet.  Cultures grew Proteus Abx were deescalated IV Zosyn.   was consulted to facilitate  outpatient PCP, diabetes center appointments, as well as some health resources ongoing wound care and diabetic management. Wound vac ordered and placed prior to DC. He is stable for DC and cleared for DC from ID standpoint.     PE Chest CTA heart RRR  Muscl: left foot dressing intact with wound vac in place.   Discussed with patient importance of follow up. Diabetic equipment ordered for home monitoring.      Consults:   Consults (From admission, onward)        Status Ordering Provider     Inpatient consult to Diabetes educator  Once     Provider:  (Not yet assigned)    Completed MIKE RODRIGUEZ     Inpatient consult to Infectious Diseases  Once     Provider:  Sandra Espino MD    Completed MIKE RODRIGUEZ     Inpatient consult to Podiatry  Once     Provider:  Bianca Medrano DPM    Acknowledged SHITAL ALEMAN     Inpatient consult to Social Work/Case Management  Once     Provider:  (Not yet assigned)    Completed BIANCA MEDRANO     Inpatient consult to Social Work/Case Management  Once     Provider:  (Not yet assigned)    Completed MIKE RODRIGUEZ     Inpatient consult to Social Work/Case Management  Once     Provider:  (Not yet assigned)    Completed BIANCA MEDRANO     IP consult to dietary  Once     Provider:  (Not yet assigned)    Completed SHITAL ALEMAN          Diabetic polyneuropathy associated with type 2 diabetes mellitus  With decreased sensation BLE on exam. Add gabapentin        Final Active Diagnoses:    Diagnosis Date Noted POA    PRINCIPAL PROBLEM:  Diabetic infection of right foot [E11.628, L08.9] 09/05/2020 Yes    Diabetic polyneuropathy associated with type 2 diabetes mellitus [E11.42] 09/08/2020 Yes    Foot ulceration, right, with unspecified severity [L97.519] 09/04/2020 Yes    Type 2 diabetes mellitus with foot ulcer, without long-term current use of insulin [E11.621, L97.509] 09/04/2020 Yes      Problems Resolved During this Admission:       Discharged Condition:  "stable    [place] [facility]    Follow Up:  Follow-up Information     Bianca Medrano DPM On 9/11/2020.    Specialties: Podiatry, Surgery, Wound Care  Why: pt will see dr in wound care clinic  Contact information:  [address]  [address]  [facility]  [place] [address]  [phone]             Eric Zavala MD, MD On 9/15/2020.    Why: hosp f/u in avs  Contact information:  [address]  [place] [address] [phone]             [facility].    Why: Ally will contadct pt with appointment  Contact information:  [facility]   [address] [facility] [place]   [address]     Phone: [phone]           Sandra Espino MD In 2 weeks.    Specialty: Infectious Diseases  Contact information:  [address]  [address]  [place] [address]  [phone]             [facility].    Why: Home Health  Contact information:  [address]  [facility] [address]  [phone]               Patient Instructions:      BATH/SHOWER CHAIR FOR HOME USE     Order Specific Question Answer Comments   Height: 5' 11" (1.803 m)    Weight: 98.2 kg (216 lb 7.9 oz)    Does patient have medical equipment at home? none    Length of need (1-99 months): 90    Type: Without back      Ambulatory referral/consult to Home Health   Standing Status: Future   Referral Priority: Routine Referral Type: Home Health   Referral Reason: Specialty Services Required   Requested Specialty: Home Health Services   Number of Visits Requested: 1     Ambulatory referral/consult to Endocrinology   Standing Status: Future   Referral Priority: Routine Referral Type: Consultation   Requested Specialty: Endocrinology   Number of Visits Requested: 1     Ambulatory referral/consult to Home Health   Standing Status: Future   Referral Priority: Routine Referral Type: Home Health   Referral Reason: Specialty Services Required   Requested Specialty: Home Health Services "   Number of Visits Requested: 1     Diet diabetic     Notify your health care provider if you experience any of the following:  worsening rash     Notify your health care provider if you experience any of the following:  difficulty breathing or increased cough     Notify your health care provider if you experience any of the following:  redness, tenderness, or signs of infection (pain, swelling, redness, odor or green/yellow discharge around incision site)     Notify your health care provider if you experience any of the following:  severe uncontrolled pain     Notify your health care provider if you experience any of the following:  persistent nausea and vomiting or diarrhea     Notify your health care provider if you experience any of the following:  temperature >100.4     Activity as tolerated       Significant Diagnostic Studies: Labs:   BMP:   Recent Labs   Lab 09/07/20 0424 09/08/20 0513   * 213*    140   K 4.0 4.5    101   CO2 27 29   BUN 15 16   CREATININE 1.0 1.1   CALCIUM 9.1 9.8   MG 1.9 2.0    and CMP   Recent Labs   Lab 09/07/20 0424 09/08/20 0513    140   K 4.0 4.5    101   CO2 27 29   * 213*   BUN 15 16   CREATININE 1.0 1.1   CALCIUM 9.1 9.8   PROT 6.5 7.0   ALBUMIN 2.5* 2.5*   BILITOT 0.4 0.3   ALKPHOS 68 82   AST 16 20   ALT 17 25   ANIONGAP 9 10   ESTGFRAFRICA >60 >60   EGFRNONAA >60 >60     Microbiology: Wound Culture: positive for   Proteus mirabilis Klebsiella aerogenes      CULTURE, AEROBIC  (SPECIFY SOURCE) (Preliminary) CULTURE, AEROBIC  (SPECIFY SOURCE) (Preliminary)       Radiology:   MRI Foot (Forefoot)Right With Contrast [[id number]] Resulted: 09/05/20 1253   Order Status: Completed Updated: 09/05/20 1256   Narrative:     EXAMINATION:   MRI FOOT (FOREFOOT) RIGHT WITH CONTRAST     CLINICAL HISTORY:   Osteomyelitis suspected, diabetic;     TECHNIQUE:   Multiplanar, multisequence MR imaging of the right forefoot prior to and following the intravenous  administration of 9 cc of Gadavist.     COMPARISON:   Plain films 09/04/2020.     FINDINGS:   There is a small approximately 2 cm cutaneous defect within the subcutaneous tissues just medial to the 1st MTP joint with associated induration and subcutaneous T2 hyperintense edema consistent with the patient's nonhealing diabetic ulcer with surrounding cellulitis.  No subcutaneous fluid collection identified.  No MRI evidence for soft tissue emphysema.     No adjacent abnormal focus of cortical erosion or focal marrow signal abnormality to suggest MRI evidence for osteomyelitis.  There is mild degenerative osteoarthrosis of the 1st MTP joint with small osteophyte formation.  There is a small effusion of the 1st MTP joint.     The visualized flexor and extensor tendons are intact.  No tenosynovitis.  There is mild T2 hyperintense edema within the plantar muscles of the forefoot consistent with diabetic related edema.    Impression:       1. Small nonhealing diabetic ulcer within the subcutaneous tissues adjacent to the 1st MTP joint with associated cellulitis.   2. No MRI evidence to suggest an underlying focus of osteomyelitis.   3. Small effusion of the 1st MTP joint.       Electronically signed by: Jack Layton   Date: 09/05/2020   Time: 12:53   US Lower Extremity Veins Right [[id number]] Resulted: 09/04/20 2053   Order Status: Completed Updated: 09/04/20 2055   Narrative:     EXAMINATION:   US LOWER EXTREMITY VEINS RIGHT     CLINICAL HISTORY:   Other specified soft tissue disorders     TECHNIQUE:   Duplex and color flow Doppler evaluation and graded compression of the right lower extremity veins was performed.     COMPARISON:   None     FINDINGS:   Right thigh veins: The common femoral, femoral, popliteal, upper greater saphenous, and deep femoral veins are patent and free of thrombus. The veins are normally compressible and have normal phasic flow and augmentation response.     Right calf veins: The visualized calf  veins are patent.     Contralateral CFV: The contralateral (left) common femoral vein is patent and free of thrombus.     Miscellaneous: There is no appreciable popliteal cyst on the right.  The    Impression:       No evidence of deep venous thrombosis in the right lower extremity.       Electronically signed by: Reyna Arevalo   Date: 09/04/2020   Time: 20:53   X-Ray Foot Complete Right [[id number]] Resulted: 09/04/20 1919   Order Status: Completed Updated: 09/04/20 1921   Narrative:     EXAMINATION:   XR FOOT COMPLETE 3 VIEW RIGHT     CLINICAL HISTORY:   . Non-pressure chronic ulcer of other part of right foot with unspecified severity     TECHNIQUE:   AP, lateral, and oblique views of the right foot were performed.     COMPARISON:   None     FINDINGS:   There is no erosion or focal area of sclerosis involving the imaged osseous structures of the right foot.  Joint spaces appear well maintained.  Degenerative changes are noted involving the great toe MTP joint with minor joint narrowing and osteophyte formation.  Bony mineralization is intact.  There are no foreign bodies which are opaque in the imaged soft tissues and there is no appreciable soft tissue gas to suggest abscess.  Calcaneal dorsal and plantar enthesophytes are incidentally noted.    Impression:       No evidence of erosive changes or focal osseous findings to suggest osteomyelitis in this patient with soft tissue area of ulceration.     Degenerative changes as described.            Pending Diagnostic Studies:     None         Medications:  Reconciled Home Medications:      Medication List      START taking these medications    blood sugar diagnostic Strp  1 each by Misc.(Non-Drug; Combo Route) route 4 (four) times daily with meals and nightly.     blood-glucose meter Misc  Commonly known as: FREESTYLE INSULINX  1 each by Misc.(Non-Drug; Combo Route) route 4 (four) times daily before meals and nightly.     gabapentin 100 MG capsule  Commonly known  as: NEURONTIN  Take 1 capsule (100 mg total) by mouth 3 (three) times daily.     lancets Misc  1 each by Misc.(Non-Drug; Combo Route) route 4 (four) times daily before meals and nightly.     levoFLOXacin 750 MG tablet  Commonly known as: LEVAQUIN  Take 1 tablet (750 mg total) by mouth once daily.        CONTINUE taking these medications    cetirizine 10 MG tablet  Commonly known as: ZYRTEC  Take 1 tablet (10 mg total) by mouth once daily.     fluticasone propionate 50 mcg/actuation nasal spray  Commonly known as: FLONASE  2 sprays (100 mcg total) by Each Nostril route once daily.     metFORMIN 500 MG tablet  Commonly known as: GLUCOPHAGE  Take 500 mg by mouth 2 (two) times daily with meals.            Indwelling Lines/Drains at time of discharge:   Lines/Drains/Airways     None                 Time spent on the discharge of patient: 60 minutes  Patient was seen and examined on the date of discharge and determined to be suitable for discharge.         Felipa Yu NP  Department of Hospital Medicine  [facility]

## 2020-09-09 NOTE — PLAN OF CARE
9/9/2020 @1:45pm  Garrick received a call from Nancy ([phone]) with [facility] in .  She informed me that she visited with pt today and he was informing her about his blood sugar being in the 200's and he only has po diabetic medication, but no Insulin. He thought he would be discharged on Insulin, but was not.   According to Nancy, pt was educated by MARY Pate on how to use insulin, per patient.   Garrick called Felipa Yu NP to inform her of the above.  LANIE Leo will call in script for pt and give  me information on Insulin sliding scale for the home health nurse to educate pt. On.  Garrick will follow-up with Osvaldo in .         09/09/20 1401   Discharge Reassessment   Assessment Type Discharge Planning Reassessment

## 2020-09-10 ENCOUNTER — PATIENT OUTREACH (OUTPATIENT)
Dept: ADMINISTRATIVE | Facility: CLINIC | Age: 50
End: 2020-09-10

## 2020-09-10 LAB
BACTERIA BLD CULT: NORMAL
BACTERIA BLD CULT: NORMAL

## 2020-09-10 RX ORDER — INSULIN LISPRO 100 [IU]/ML
5 INJECTION, SOLUTION INTRAVENOUS; SUBCUTANEOUS
Qty: 4.5 ML | Refills: 11
Start: 2020-09-10 | End: 2020-10-16 | Stop reason: SDUPTHER

## 2020-09-10 NOTE — TELEPHONE ENCOUNTER
C3 nurse attempted to contact patient. No answer. The following message was left for the patient to return the call:  Good afternoon  I am a nurse calling on behalf of [facility] from the [facility].  This is a Transitional Care Call for Garret Munguia. When you have a moment please contact us at [phone] or [phone] Monday through Friday, between the hours of 8 am to 4 pm. We look forward to speaking with you. On behalf of [facility] have a nice day.    The patient has a scheduled HOSFU appointment with LANIE Witt on 9/15/2020 @ 0820. Message sent to Physician staff.    9/14/2020 @ 0805  Left voicemail

## 2020-09-10 NOTE — PROGRESS NOTES
**MODERATE CORRECTION DOSE**    Blood Glucose  mg/dL                  Pre-meal                Bedtime  151-200                2 units                    1 unit  201-250                4 units                    2 units    251-300                6 units                    3 units    301-350                8 units                    4 units   >350                     10 units                  5 units  **CALL MD for BG >350**

## 2020-09-11 ENCOUNTER — OFFICE VISIT (OUTPATIENT)
Dept: WOUND CARE | Facility: HOSPITAL | Age: 50
End: 2020-09-11
Attending: PODIATRIST
Payer: COMMERCIAL

## 2020-09-11 VITALS
TEMPERATURE: 97 F | HEART RATE: 84 BPM | DIASTOLIC BLOOD PRESSURE: 78 MMHG | RESPIRATION RATE: 16 BRPM | SYSTOLIC BLOOD PRESSURE: 122 MMHG

## 2020-09-11 DIAGNOSIS — E11.69 DIABETIC FOOT ULCER WITH OSTEOMYELITIS: ICD-10-CM

## 2020-09-11 DIAGNOSIS — M86.9 DIABETIC FOOT ULCER WITH OSTEOMYELITIS: ICD-10-CM

## 2020-09-11 DIAGNOSIS — L97.514 ULCER OF RIGHT FOOT, WITH NECROSIS OF BONE: Primary | ICD-10-CM

## 2020-09-11 DIAGNOSIS — E11.621 DIABETIC FOOT ULCER WITH OSTEOMYELITIS: ICD-10-CM

## 2020-09-11 DIAGNOSIS — L97.509 DIABETIC FOOT ULCER WITH OSTEOMYELITIS: ICD-10-CM

## 2020-09-11 PROCEDURE — 99213 OFFICE O/P EST LOW 20 MIN: CPT | Mod: 25 | Performed by: PODIATRIST

## 2020-09-11 PROCEDURE — 11043 DBRDMT MUSC&/FSCA 1ST 20/<: CPT | Performed by: PODIATRIST

## 2020-09-11 PROCEDURE — 11043 DBRDMT MUSC&/FSCA 1ST 20/<: CPT | Mod: ,,, | Performed by: PODIATRIST

## 2020-09-11 PROCEDURE — 99213 OFFICE O/P EST LOW 20 MIN: CPT | Mod: 25,,, | Performed by: PODIATRIST

## 2020-09-11 PROCEDURE — 11043 PR DEBRIDEMENT, SKIN, SUB-Q TISSUE,MUSCLE,=<20 SQ CM: ICD-10-PCS | Mod: ,,, | Performed by: PODIATRIST

## 2020-09-11 PROCEDURE — 1111F PR DISCHARGE MEDS RECONCILED W/ CURRENT OUTPATIENT MED LIST: ICD-10-PCS | Mod: ,,, | Performed by: PODIATRIST

## 2020-09-11 PROCEDURE — 1111F DSCHRG MED/CURRENT MED MERGE: CPT | Mod: ,,, | Performed by: PODIATRIST

## 2020-09-11 PROCEDURE — 3046F PR MOST RECENT HEMOGLOBIN A1C LEVEL > 9.0%: ICD-10-PCS | Mod: ,,, | Performed by: PODIATRIST

## 2020-09-11 PROCEDURE — 3046F HEMOGLOBIN A1C LEVEL >9.0%: CPT | Mod: ,,, | Performed by: PODIATRIST

## 2020-09-11 PROCEDURE — 99213 PR OFFICE/OUTPT VISIT, EST, LEVL III, 20-29 MIN: ICD-10-PCS | Mod: 25,,, | Performed by: PODIATRIST

## 2020-09-11 NOTE — PROGRESS NOTES
[address] [address]  [place] [address]  Phone: [phone]   [phone]    Patient's PCP:Primary Doctor No  Referring Provider: No ref. provider found    Subjective:      Chief Complaint:: Wound Care    HPI  Garret Munguia is a 50 y.o. male who presents with a complaint of  right foot 1st metatarsophalangeal joint plantar wound with septic arthritis lasting for approximately 1 month. Onset of the symptoms was blister on bottom of foot.  Patient 1st presented to me while inpatient at [facility] or [facility].  MRI was done which showed joint effusion of the 1st metatarsophalangeal joint, right foot.  Bedside debridement was done down to joint capsule.  This is the 1st follow-up visit since hospital.  We will place wound VAC on.  Excess dead tissue has built up.  Debrided all of this tissue.  Patient has home health who will change dressing on Tuesdays and patient will follow up at wound Care on Fridays.  Patient to follow up in 1 week.  Continue Canelo supplementation, monitoring blood sugars, staying off foot with roller scooter, and increasing protein intake.  Patient is currently working from home.  His work will be contacted by myself in order for this to continue, with patient still receiving full pay/benefits..      Vitals:    09/11/20 1058   BP: 122/78   Pulse: 84   Resp: 16   Temp: 97.3 °F (36.3 °C)     Shoe Size:     Past Surgical History:   Procedure Laterality Date    CERVICAL SPINE SURGERY       Past Medical History:   Diagnosis Date    Diabetes mellitus, type 2      Family History   Problem Relation Age of Onset    Diabetes Brother     Diabetes Maternal Grandmother         Social History:   Marital Status: Significant Other  Alcohol History:  reports previous alcohol use.  Tobacco History:  reports that he has never smoked. He has never used smokeless tobacco.  Drug History:  reports no history of drug use.    Review of patient's allergies indicates:  No Known Allergies    Current Outpatient  Medications   Medication Sig Dispense Refill    blood sugar diagnostic Strp 1 each by Misc.(Non-Drug; Combo Route) route 4 (four) times daily with meals and nightly. 100 each 0    blood-glucose meter (FREESTYLE INSULINX) Misc 1 each by Misc.(Non-Drug; Combo Route) route 4 (four) times daily before meals and nightly. 1 each 0    cetirizine (ZYRTEC) 10 MG tablet Take 1 tablet (10 mg total) by mouth once daily. (Patient not taking: Reported on 9/4/2020) 90 tablet 0    fluticasone propionate (FLONASE) 50 mcg/actuation nasal spray 2 sprays (100 mcg total) by Each Nostril route once daily. (Patient not taking: Reported on 9/4/2020) 16 g 0    gabapentin (NEURONTIN) 100 MG capsule Take 1 capsule (100 mg total) by mouth 3 (three) times daily. 90 capsule 0    insulin lispro (HUMALOG U-100 INSULIN) 100 unit/mL injection Inject 5 Units into the skin before meals and at bedtime as needed for High Blood Sugar. 4.5 mL 11    lancets Misc 1 each by Misc.(Non-Drug; Combo Route) route 4 (four) times daily before meals and nightly. 100 each 0    levoFLOXacin (LEVAQUIN) 750 MG tablet Take 1 tablet (750 mg total) by mouth once daily. 30 tablet 0    metFORMIN (GLUCOPHAGE) 500 MG tablet Take 1 tablet (500 mg total) by mouth 2 (two) times daily with meals. 60 tablet 0     No current facility-administered medications for this visit.        Review of Systems      Objective:        Physical Exam:   Foot Exam  Physical Exam    Imaging:            Assessment:       1. Ulcer of right foot, with necrosis of bone    2. Diabetic foot ulcer with osteomyelitis      Plan:   Ulcer of right foot, with necrosis of bone  -     CV Segmental Pressures Low Ext WO Stress; Future    Diabetic foot ulcer with osteomyelitis      Follow up in about 1 week (around 9/18/2020) for wound care.    Procedures - None    Counseling:     I provided patient education verbally regarding:   Patient diagnosis, treatment options, as well as alternatives, risks, and  benefits.     This note was created using Dragon voice recognition software that occasionally misinterpreted phrases or words.

## 2020-09-14 ENCOUNTER — TELEPHONE (OUTPATIENT)
Dept: FAMILY MEDICINE | Facility: CLINIC | Age: 50
End: 2020-09-14

## 2020-09-14 NOTE — TELEPHONE ENCOUNTER
----- Message from Eris Contreras sent at 9/14/2020 10:57 AM CDT -----  Regarding: return call  Contact: sefl  Type:  Patient Returning Call    Who Called: self   Who Left Message for Patient:  Cathy   Does the patient know what this is regarding?:    Best Call Back Number:  [phone]  Additional Information:

## 2020-09-14 NOTE — TELEPHONE ENCOUNTER
----- Message from Geovanni Roblero sent at 9/14/2020  7:34 AM CDT -----  Type:  RX Refill Request    Who Called:  pt  Refill or New Rx:  refill / new  RX Name and Strength:  insulin  How is the patient currently taking it? (ex. 1XDay):    Is this a 30 day or 90 day RX:  90  Preferred Pharmacy with phone number:      [id number] - [place] - [address]  [address]  [place] [address]  Phone: [phone] Fax: [phone]    Local or Mail Order:    Ordering Provider:    Best Call Back Number:  [phone]  Additional Information:  pt states the insulin is defective and needing to be replaced. Pt was told by hosp to contact . Pt needs refill meanwhile. Please call to advise

## 2020-09-15 ENCOUNTER — EXTERNAL HOME HEALTH (OUTPATIENT)
Dept: HOME HEALTH SERVICES | Facility: HOSPITAL | Age: 50
End: 2020-09-15
Payer: COMMERCIAL

## 2020-09-17 ENCOUNTER — TELEPHONE (OUTPATIENT)
Dept: CARDIOLOGY | Facility: HOSPITAL | Age: 50
End: 2020-09-17

## 2020-09-18 ENCOUNTER — OFFICE VISIT (OUTPATIENT)
Dept: WOUND CARE | Facility: HOSPITAL | Age: 50
End: 2020-09-18
Attending: PODIATRIST
Payer: COMMERCIAL

## 2020-09-18 VITALS
TEMPERATURE: 97 F | DIASTOLIC BLOOD PRESSURE: 84 MMHG | HEART RATE: 73 BPM | RESPIRATION RATE: 16 BRPM | SYSTOLIC BLOOD PRESSURE: 126 MMHG

## 2020-09-18 DIAGNOSIS — L97.514 ULCER OF RIGHT FOOT, WITH NECROSIS OF BONE: Primary | ICD-10-CM

## 2020-09-18 PROCEDURE — 11043 PR DEBRIDEMENT, SKIN, SUB-Q TISSUE,MUSCLE,=<20 SQ CM: ICD-10-PCS | Mod: ,,, | Performed by: PODIATRIST

## 2020-09-18 PROCEDURE — 11043 DBRDMT MUSC&/FSCA 1ST 20/<: CPT | Performed by: PODIATRIST

## 2020-09-18 PROCEDURE — 11043 DBRDMT MUSC&/FSCA 1ST 20/<: CPT | Mod: ,,, | Performed by: PODIATRIST

## 2020-09-18 NOTE — PROGRESS NOTES
[address] [facility] [address] [place] [address]  Phone: [phone]   [phone]    Patient's PCP:Primary Doctor No  Referring Provider: Dr. Bianca Medrano    Subjective:      Chief Complaint:: Wound Care    HPI  Garret Munguia is a 50 y.o. male who presents with a complaint of  right foot 1st metatarsophalangeal joint plantar wound with septic arthritis lasting for approximately 1 month. Onset of the symptoms was blister on bottom of foot.  Patient 1st presented to me while inpatient at [facility] or [facility].  MRI was done which showed joint effusion of the 1st metatarsophalangeal joint, right foot.  Bedside debridement was done down to joint capsule.  This is the 1st follow-up visit since hospital.  We will place wound VAC on.  Excess dead tissue has built up.  Debrided all of this tissue.  Patient has home health who will change dressing on Tuesdays and patient will follow up at wound Care on Fridays.  Patient to follow up in 1 week.  Continue Canelo supplementation, monitoring blood sugars, staying off foot with roller scooter, and increasing protein intake.  Patient is currently working from home.  His work will be contacted by myself in order for this to continue, with patient still receiving full pay/benefits..    9/18:  Continue with wound VAC.  Debrided wound to muscle today.  Follow up in 1 week.  No signs of infection.    Vitals:    09/18/20 1252   BP: 126/84   Pulse: 73   Resp: 16   Temp: 97.3 °F (36.3 °C)     Shoe Size:     Past Surgical History:   Procedure Laterality Date    CERVICAL SPINE SURGERY       Past Medical History:   Diagnosis Date    Diabetes mellitus, type 2      Family History   Problem Relation Age of Onset    Diabetes Brother     Diabetes Maternal Grandmother         Social History:   Marital Status: Significant Other  Alcohol History:  reports previous alcohol use.  Tobacco History:  reports that he has never smoked. He has never used smokeless tobacco.  Drug History:  reports no  history of drug use.    Review of patient's allergies indicates:  No Known Allergies    Current Outpatient Medications   Medication Sig Dispense Refill    blood sugar diagnostic Strp 1 each by Misc.(Non-Drug; Combo Route) route 4 (four) times daily with meals and nightly. 100 each 0    blood-glucose meter (FREESTYLE INSULINX) Misc 1 each by Misc.(Non-Drug; Combo Route) route 4 (four) times daily before meals and nightly. 1 each 0    cetirizine (ZYRTEC) 10 MG tablet Take 1 tablet (10 mg total) by mouth once daily. (Patient not taking: Reported on 9/4/2020) 90 tablet 0    fluticasone propionate (FLONASE) 50 mcg/actuation nasal spray 2 sprays (100 mcg total) by Each Nostril route once daily. (Patient not taking: Reported on 9/4/2020) 16 g 0    gabapentin (NEURONTIN) 100 MG capsule Take 1 capsule (100 mg total) by mouth 3 (three) times daily. 90 capsule 0    insulin lispro (HUMALOG U-100 INSULIN) 100 unit/mL injection Inject 5 Units into the skin before meals and at bedtime as needed for High Blood Sugar. 4.5 mL 11    lancets Misc 1 each by Misc.(Non-Drug; Combo Route) route 4 (four) times daily before meals and nightly. 100 each 0    levoFLOXacin (LEVAQUIN) 750 MG tablet Take 1 tablet (750 mg total) by mouth once daily. 30 tablet 0    metFORMIN (GLUCOPHAGE) 500 MG tablet Take 1 tablet (500 mg total) by mouth 2 (two) times daily with meals. 60 tablet 0     No current facility-administered medications for this visit.        Review of Systems      Objective:        Physical Exam:   Foot Exam  Physical Exam    Imaging:            Assessment:       1. Ulcer of right foot, with necrosis of bone      Plan:   Ulcer of right foot, with necrosis of bone      Follow up in about 1 year (around 9/18/2021) for wound care.    Procedures - None    Counseling:     I provided patient education verbally regarding:   Patient diagnosis, treatment options, as well as alternatives, risks, and benefits.     This note was created  using Dragon voice recognition software that occasionally misinterpreted phrases or words.

## 2020-09-21 ENCOUNTER — OFFICE VISIT (OUTPATIENT)
Dept: FAMILY MEDICINE | Facility: CLINIC | Age: 50
End: 2020-09-21
Payer: COMMERCIAL

## 2020-09-21 VITALS
TEMPERATURE: 98 F | RESPIRATION RATE: 18 BRPM | WEIGHT: 207 LBS | DIASTOLIC BLOOD PRESSURE: 82 MMHG | OXYGEN SATURATION: 97 % | HEIGHT: 71 IN | BODY MASS INDEX: 28.98 KG/M2 | HEART RATE: 95 BPM | SYSTOLIC BLOOD PRESSURE: 127 MMHG

## 2020-09-21 DIAGNOSIS — E11.621 TYPE 2 DIABETES MELLITUS WITH FOOT ULCER, WITHOUT LONG-TERM CURRENT USE OF INSULIN: ICD-10-CM

## 2020-09-21 DIAGNOSIS — Z09 HOSPITAL DISCHARGE FOLLOW-UP: Primary | ICD-10-CM

## 2020-09-21 DIAGNOSIS — L97.509 TYPE 2 DIABETES MELLITUS WITH FOOT ULCER, WITHOUT LONG-TERM CURRENT USE OF INSULIN: ICD-10-CM

## 2020-09-21 DIAGNOSIS — R21 FACIAL RASH: ICD-10-CM

## 2020-09-21 PROCEDURE — 99204 OFFICE O/P NEW MOD 45 MIN: CPT | Mod: S$GLB,,, | Performed by: NURSE PRACTITIONER

## 2020-09-21 PROCEDURE — 99204 PR OFFICE/OUTPT VISIT, NEW, LEVL IV, 45-59 MIN: ICD-10-PCS | Mod: S$GLB,,, | Performed by: NURSE PRACTITIONER

## 2020-09-21 PROCEDURE — 3046F HEMOGLOBIN A1C LEVEL >9.0%: CPT | Mod: S$GLB,,, | Performed by: NURSE PRACTITIONER

## 2020-09-21 PROCEDURE — 3008F BODY MASS INDEX DOCD: CPT | Mod: S$GLB,,, | Performed by: NURSE PRACTITIONER

## 2020-09-21 PROCEDURE — 3046F PR MOST RECENT HEMOGLOBIN A1C LEVEL > 9.0%: ICD-10-PCS | Mod: S$GLB,,, | Performed by: NURSE PRACTITIONER

## 2020-09-21 PROCEDURE — 99999 PR PBB SHADOW E&M-EST. PATIENT-LVL IV: ICD-10-PCS | Mod: PBBFAC,,, | Performed by: NURSE PRACTITIONER

## 2020-09-21 PROCEDURE — 3008F PR BODY MASS INDEX (BMI) DOCUMENTED: ICD-10-PCS | Mod: S$GLB,,, | Performed by: NURSE PRACTITIONER

## 2020-09-21 PROCEDURE — 99999 PR PBB SHADOW E&M-EST. PATIENT-LVL IV: CPT | Mod: PBBFAC,,, | Performed by: NURSE PRACTITIONER

## 2020-09-21 RX ORDER — ONDANSETRON HYDROCHLORIDE 8 MG/1
8 TABLET, FILM COATED ORAL EVERY 8 HOURS PRN
Qty: 30 TABLET | Refills: 0 | Status: SHIPPED | OUTPATIENT
Start: 2020-09-21 | End: 2020-10-01

## 2020-09-21 RX ORDER — CLINDAMYCIN PHOSPHATE 10 MG/G
GEL TOPICAL 2 TIMES DAILY
Qty: 30 G | Refills: 0 | Status: SHIPPED | OUTPATIENT
Start: 2020-09-21 | End: 2021-06-16

## 2020-09-21 RX ORDER — INSULIN GLARGINE 100 [IU]/ML
5 INJECTION, SOLUTION SUBCUTANEOUS DAILY
Qty: 3 ML | Refills: 0 | Status: SHIPPED | OUTPATIENT
Start: 2020-09-21 | End: 2020-10-19

## 2020-09-21 NOTE — PROGRESS NOTES
"Subjective:       Patient ID: Garret Munguia is a 50 y.o. male.    Chief Complaint: Follow-up (hospital f/u)    Mr. Garret Munguia is a 50 year old male who presents today for hospital follow up. Mr. Munguia  Reports he dx with T2DM in 2006. Recently, on 9/4/20, was seen at Urgent Care for a "ulcer" on his foot. He was sent to ER, but then was admitted. He has a strong family hx of T2DM. [facility] He is currently seeing wound care on Fridays. He has a wound vac, and reports HH comes on Tuesday and does wound cleaning with saline, and dressing changes. He has been referred to endo with no appt.  He is requesting to transition care. In regard to the patient's diabetes, patient reports that blood sugars have been Uncontrolled. Patient denies hypoglycemia. Patient is currently on insulin therapy. Patient is not on ACE/ARB and is not on statin. Last a1c was Hemoglobin A1C       Date                     Value               Ref Range           Status                09/05/2020               13.0 (H)            4.0 - 5.6 %         Final            Comment:  ADA Screening Guidelines: 5.7-6.4%  Consistent with prediabetes >or=6.5%  Consistent with diabetes High levels of fetal hemoglobin interfere with the HbA1C assay. Heterozygous hemoglobin variants (HbS, HgC, etc)do not significantly interfere with this assay.  However, presence of multiple variants may affect accuracy.    ----------        Follow-up  Associated symptoms include arthralgias. Pertinent negatives include no abdominal pain, chest pain, chills, congestion, coughing, fatigue, fever, headaches, joint swelling, myalgias, nausea, rash, sore throat, vomiting or weakness.     Review of Systems   Constitutional: Positive for activity change. Negative for appetite change, chills, fatigue, fever and unexpected weight change.   HENT: Negative for congestion, ear pain, postnasal drip, sore throat and tinnitus.    Eyes: Negative for pain and visual " "disturbance.   Respiratory: Negative for apnea, cough, chest tightness, shortness of breath and wheezing.    Cardiovascular: Negative for chest pain, palpitations and leg swelling.   Gastrointestinal: Negative for abdominal distention, abdominal pain, blood in stool, constipation, diarrhea, nausea and vomiting.   Endocrine: Negative for polydipsia and polyuria.   Genitourinary: Negative for difficulty urinating, flank pain, frequency and urgency.   Musculoskeletal: Positive for arthralgias. Negative for back pain, joint swelling and myalgias.   Skin: Positive for wound. Negative for color change, pallor and rash.   Allergic/Immunologic: Negative for environmental allergies and food allergies.   Neurological: Negative for dizziness, tremors, syncope, weakness, light-headedness and headaches.   Hematological: Does not bruise/bleed easily.   Psychiatric/Behavioral: Negative for agitation, decreased concentration, self-injury, sleep disturbance and suicidal ideas. The patient is not nervous/anxious.          Reviewed family, medical, surgical, and social history.    Objective:      /82 (BP Location: Left arm, Patient Position: Sitting, BP Method: Medium (Automatic))   Pulse 95   Temp 97.8 °F (36.6 °C) (Tympanic)   Resp 18   Ht 5' 11" (1.803 m)   Wt 93.9 kg (207 lb)   SpO2 97%   BMI 28.87 kg/m²   Physical Exam  Constitutional:       General: He is not in acute distress.     Appearance: Normal appearance. He is well-developed. He is not diaphoretic.   HENT:      Head: Normocephalic and atraumatic.      Nose: Nose normal.      Mouth/Throat:      Pharynx: Uvula midline. No uvula swelling.   Eyes:      General: Lids are normal.      Conjunctiva/sclera: Conjunctivae normal.      Pupils: Pupils are equal, round, and reactive to light.   Neck:      Musculoskeletal: Full passive range of motion without pain. No neck rigidity.      Thyroid: No thyroid mass.      Trachea: Trachea normal. No tracheal tenderness. "   Cardiovascular:      Rate and Rhythm: Normal rate and regular rhythm.      Pulses: Normal pulses.      Heart sounds: Normal heart sounds, S1 normal and S2 normal.   Pulmonary:      Effort: Pulmonary effort is normal. No respiratory distress.      Breath sounds: Normal breath sounds. No decreased breath sounds or wheezing.   Abdominal:      General: Bowel sounds are normal. There is no distension or abdominal bruit.      Palpations: Abdomen is soft.      Tenderness: There is no guarding.   Musculoskeletal:         General: No tenderness or deformity.   Feet:      Comments: Right boot noted to right foot  Wound vac noted    Lymphadenopathy:      Cervical: No cervical adenopathy.   Skin:     General: Skin is warm and dry.      Capillary Refill: Capillary refill takes less than 2 seconds.      Findings: No abrasion, laceration, lesion or rash.      Nails: There is no clubbing.     Neurological:      Mental Status: He is alert and oriented to person, place, and time.      Motor: No abnormal muscle tone.   Psychiatric:         Speech: Speech normal.         Behavior: Behavior normal. Behavior is cooperative.         Thought Content: Thought content normal.         Judgment: Judgment normal.         Assessment:       1. Hospital discharge follow-up    2. Type 2 diabetes mellitus with foot ulcer, without long-term current use of insulin    3. Facial rash        Plan:       Hospital discharge follow-up    Type 2 diabetes mellitus with foot ulcer, without long-term current use of insulin  -     insulin (LANTUS SOLOSTAR U-100 INSULIN) glargine 100 units/mL (3mL) SubQ pen; Inject 5 Units into the skin once daily.  Dispense: 3 mL; Refill: 0  -     ondansetron (ZOFRAN) 8 MG tablet; Take 1 tablet (8 mg total) by mouth every 8 (eight) hours as needed for Nausea.  Dispense: 30 tablet; Refill: 0    Facial rash  -     clindamycin phosphate 1% (CLINDAGEL) 1 % gel; Apply topically 2 (two) times daily.  Dispense: 30 g; Refill:  0    PLAN:  - Discussed with patient the plan of care  - refax referral to endo  - Start long acting insulin  - Continue to monitor cbgs  - protein diet reviewed  - continue to f/u with wound care as ordered  - Medications reviewed. Medication side effects discussed. Patient has no questions or concerns at this time. Informed patient to notify me regarding any concerns.    - Informed patient to please notify me with any questions or concerns at anytime  - Follow up ordered for 4 weeks            Risks, benefits, and side effects were discussed with the patient. All questions were answered to the fullest satisfaction of the patient, and pt verbalized understanding and agreement to treatment plan. Pt was to call with any new or worsening symptoms, or present to the ER.

## 2020-09-23 ENCOUNTER — TELEPHONE (OUTPATIENT)
Dept: PODIATRY | Facility: CLINIC | Age: 50
End: 2020-09-23

## 2020-09-23 NOTE — TELEPHONE ENCOUNTER
Received VM from Carly with [facility]. Called regarding patient's wound vac therapy that is currently being processed for discontinuation. Said they need the information of the Home Health agency or who is taking care of the dressing changes for the wound vac.  Can callback at: [phone] or fax to [phone].

## 2020-09-25 ENCOUNTER — OFFICE VISIT (OUTPATIENT)
Dept: WOUND CARE | Facility: HOSPITAL | Age: 50
End: 2020-09-25
Attending: PODIATRIST
Payer: COMMERCIAL

## 2020-09-25 VITALS
TEMPERATURE: 98 F | RESPIRATION RATE: 17 BRPM | HEART RATE: 88 BPM | DIASTOLIC BLOOD PRESSURE: 80 MMHG | SYSTOLIC BLOOD PRESSURE: 127 MMHG

## 2020-09-25 DIAGNOSIS — L97.509 DIABETIC FOOT ULCER WITH OSTEOMYELITIS: ICD-10-CM

## 2020-09-25 DIAGNOSIS — E11.621 DIABETIC FOOT ULCER WITH OSTEOMYELITIS: ICD-10-CM

## 2020-09-25 DIAGNOSIS — M86.9 DIABETIC FOOT ULCER WITH OSTEOMYELITIS: ICD-10-CM

## 2020-09-25 DIAGNOSIS — E11.69 DIABETIC FOOT ULCER WITH OSTEOMYELITIS: ICD-10-CM

## 2020-09-25 DIAGNOSIS — L97.514 ULCER OF RIGHT FOOT, WITH NECROSIS OF BONE: Primary | ICD-10-CM

## 2020-09-25 PROCEDURE — 11044 DBRDMT BONE 1ST 20 SQ CM/<: CPT | Performed by: PODIATRIST

## 2020-09-25 PROCEDURE — 11044 DBRDMT BONE 1ST 20 SQ CM/<: CPT | Mod: ,,, | Performed by: PODIATRIST

## 2020-09-25 PROCEDURE — 11044 PR DEBRIDEMENT, SKIN, SUB-Q TISSUE,MUSCLE,BONE,=<20 SQ CM: ICD-10-PCS | Mod: ,,, | Performed by: PODIATRIST

## 2020-09-25 RX ORDER — LEVOFLOXACIN 750 MG/1
750 TABLET ORAL DAILY
Qty: 20 TABLET | Refills: 0 | Status: SHIPPED | OUTPATIENT
Start: 2020-09-25 | End: 2020-10-15

## 2020-09-25 RX ORDER — AMOXICILLIN AND CLAVULANATE POTASSIUM 875; 125 MG/1; MG/1
1 TABLET, FILM COATED ORAL EVERY 12 HOURS
Qty: 32 TABLET | Refills: 0 | Status: SHIPPED | OUTPATIENT
Start: 2020-09-25 | End: 2020-10-11

## 2020-09-25 NOTE — PROGRESS NOTES
[address] [facility] [address]  [place] [address]  Phone: [phone]   [phone]    Patient's PCP:Primary Doctor No  Referring Provider: No ref. provider found    Subjective:      Chief Complaint:: Wound Care    HPI  Garret Munguia is a 50 y.o. male who presents with a complaint of  right foot 1st metatarsophalangeal joint plantar wound with septic arthritis lasting for approximately 1 month. Onset of the symptoms was blister on bottom of foot.  Patient 1st presented to me while inpatient at [facility].  MRI was done which showed joint effusion of the 1st metatarsophalangeal joint, right foot.  Bedside debridement was done down to joint capsule.  This is the 1st follow-up visit since hospital.  We will place wound VAC on.  Excess dead tissue has built up.  Debrided all of this tissue.  Patient has home health who will change dressing on Tuesdays and patient will follow up at wound Care on Fridays.  Patient to follow up in 1 week.  Continue Canelo supplementation, monitoring blood sugars, staying off foot with roller scooter, and increasing protein intake.  Patient is currently working from home.  His work will be contacted by myself in order for this to continue, with patient still receiving full pay/benefits..     9/18:  Continue with wound VAC.  Debrided wound to muscle today.  Follow up in 1 week.  No signs of infection.    9/25:  Continue with wound VAC.  Debrided wound to muscle today.  Follow up in 1 week.  No signs of infection.  However there is an odor to the wound.  Therefore will refill patient's antibiotics from hospital.       Vitals:    09/25/20 1248   BP: 127/80   Pulse: 88   Resp: 17   Temp: 98.3 °F (36.8 °C)     Shoe Size:     Past Surgical History:   Procedure Laterality Date    CERVICAL SPINE SURGERY       Past Medical History:   Diagnosis Date    Diabetes mellitus, type 2      Family History   Problem Relation Age of Onset    Diabetes Brother     Diabetes Maternal Grandmother          Social History:   Marital Status: Significant Other  Alcohol History:  reports previous alcohol use.  Tobacco History:  reports that he has never smoked. He has never used smokeless tobacco.  Drug History:  reports no history of drug use.    Review of patient's allergies indicates:  No Known Allergies    Current Outpatient Medications   Medication Sig Dispense Refill    amoxicillin-clavulanate 875-125mg (AUGMENTIN) 875-125 mg per tablet Take 1 tablet by mouth every 12 (twelve) hours. for 16 days 32 tablet 0    blood sugar diagnostic Strp 1 each by Misc.(Non-Drug; Combo Route) route 4 (four) times daily with meals and nightly. 100 each 0    blood-glucose meter (FREESTYLE INSULINX) Misc 1 each by Misc.(Non-Drug; Combo Route) route 4 (four) times daily before meals and nightly. 1 each 0    clindamycin phosphate 1% (CLINDAGEL) 1 % gel Apply topically 2 (two) times daily. 30 g 0    gabapentin (NEURONTIN) 100 MG capsule Take 1 capsule (100 mg total) by mouth 3 (three) times daily. 90 capsule 0    insulin (LANTUS SOLOSTAR U-100 INSULIN) glargine 100 units/mL (3mL) SubQ pen Inject 5 Units into the skin once daily. 3 mL 0    insulin lispro (HUMALOG U-100 INSULIN) 100 unit/mL injection Inject 5 Units into the skin before meals and at bedtime as needed for High Blood Sugar. 4.5 mL 11    lancets Misc 1 each by Misc.(Non-Drug; Combo Route) route 4 (four) times daily before meals and nightly. 100 each 0    levoFLOXacin (LEVAQUIN) 750 MG tablet Take 1 tablet (750 mg total) by mouth once daily. for 20 days 20 tablet 0    metFORMIN (GLUCOPHAGE) 500 MG tablet Take 1 tablet (500 mg total) by mouth 2 (two) times daily with meals. (Patient not taking: Reported on 9/21/2020) 60 tablet 0    ondansetron (ZOFRAN) 8 MG tablet Take 1 tablet (8 mg total) by mouth every 8 (eight) hours as needed for Nausea. 30 tablet 0     No current facility-administered medications for this visit.        Review of Systems      Objective:         Physical Exam:   Foot Exam  Physical Exam    Imaging:            Assessment:       1. Ulcer of right foot, with necrosis of bone    2. Diabetic foot ulcer with osteomyelitis      Plan:   Ulcer of right foot, with necrosis of bone  -     levoFLOXacin (LEVAQUIN) 750 MG tablet; Take 1 tablet (750 mg total) by mouth once daily. for 20 days  Dispense: 20 tablet; Refill: 0  -     amoxicillin-clavulanate 875-125mg (AUGMENTIN) 875-125 mg per tablet; Take 1 tablet by mouth every 12 (twelve) hours. for 16 days  Dispense: 32 tablet; Refill: 0    Diabetic foot ulcer with osteomyelitis  -     levoFLOXacin (LEVAQUIN) 750 MG tablet; Take 1 tablet (750 mg total) by mouth once daily. for 20 days  Dispense: 20 tablet; Refill: 0  -     amoxicillin-clavulanate 875-125mg (AUGMENTIN) 875-125 mg per tablet; Take 1 tablet by mouth every 12 (twelve) hours. for 16 days  Dispense: 32 tablet; Refill: 0      Follow up in about 1 week (around 10/2/2020).    Procedures - None    Counseling:     I provided patient education verbally regarding:   Patient diagnosis, treatment options, as well as alternatives, risks, and benefits.     This note was created using Dragon voice recognition software that occasionally misinterpreted phrases or words.

## 2020-10-02 ENCOUNTER — OFFICE VISIT (OUTPATIENT)
Dept: WOUND CARE | Facility: HOSPITAL | Age: 50
End: 2020-10-02
Attending: PODIATRIST
Payer: COMMERCIAL

## 2020-10-02 ENCOUNTER — TELEPHONE (OUTPATIENT)
Dept: FAMILY MEDICINE | Facility: CLINIC | Age: 50
End: 2020-10-02

## 2020-10-02 DIAGNOSIS — R11.2 NAUSEA AND VOMITING, INTRACTABILITY OF VOMITING NOT SPECIFIED, UNSPECIFIED VOMITING TYPE: Primary | ICD-10-CM

## 2020-10-02 DIAGNOSIS — L97.514 ULCER OF RIGHT FOOT, WITH NECROSIS OF BONE: Primary | ICD-10-CM

## 2020-10-02 DIAGNOSIS — M86.9 DIABETIC FOOT ULCER WITH OSTEOMYELITIS: ICD-10-CM

## 2020-10-02 DIAGNOSIS — E11.621 DIABETIC FOOT ULCER WITH OSTEOMYELITIS: ICD-10-CM

## 2020-10-02 DIAGNOSIS — L97.509 DIABETIC FOOT ULCER WITH OSTEOMYELITIS: ICD-10-CM

## 2020-10-02 DIAGNOSIS — E11.69 DIABETIC FOOT ULCER WITH OSTEOMYELITIS: ICD-10-CM

## 2020-10-02 PROCEDURE — 11044 DBRDMT BONE 1ST 20 SQ CM/<: CPT | Mod: ,,, | Performed by: PODIATRIST

## 2020-10-02 PROCEDURE — 11044 DBRDMT BONE 1ST 20 SQ CM/<: CPT | Performed by: PODIATRIST

## 2020-10-02 PROCEDURE — 11044 PR DEBRIDEMENT, SKIN, SUB-Q TISSUE,MUSCLE,BONE,=<20 SQ CM: ICD-10-PCS | Mod: ,,, | Performed by: PODIATRIST

## 2020-10-02 RX ORDER — ONDANSETRON HYDROCHLORIDE 8 MG/1
8 TABLET, FILM COATED ORAL EVERY 8 HOURS PRN
Qty: 6 TABLET | Refills: 0 | Status: SHIPPED | OUTPATIENT
Start: 2020-10-02 | End: 2021-02-11

## 2020-10-02 NOTE — TELEPHONE ENCOUNTER
Spoke with pt's wife. Notified of Rx and labs. Instructions given. Number to Central Scheduling given. Voices understanding and will keep appt for 10/05/2020.

## 2020-10-02 NOTE — PROGRESS NOTES
[address] [facility] [address]  [place] [address]  Phone: [phone]   [phone]    Patient's PCP:Primary Doctor No  Referring Provider: No ref. provider found    Subjective:      Chief Complaint:: No chief complaint on file.    HPI  metatarsophalangeal joint plantar wound with septic arthritis lasting for approximately 1 month. Onset of the symptoms was blister on bottom of foot.  Patient 1st presented to me while inpatient at [facility] or [facility].  MRI was done which showed joint effusion of the 1st metatarsophalangeal joint, right foot.  Bedside debridement was done down to joint capsule.  This is the 1st follow-up visit since hospital.  We will place wound VAC on.  Excess dead tissue has built up.  Debrided all of this tissue.  Patient has home health who will change dressing on Tuesdays and patient will follow up at wound Care on Fridays.  Patient to follow up in 1 week.  Continue Canelo supplementation, monitoring blood sugars, staying off foot with roller scooter, and increasing protein intake.  Patient is currently working from home.  His work will be contacted by myself in order for this to continue, with patient still receiving full pay/benefits..     9/18:  Continue with wound VAC.  Debrided wound to muscle today.  Follow up in 1 week.  No signs of infection.       9/25:  Continue with wound VAC.  Debrided wound to muscle today.  Follow up in 1 week.  No signs of infection.  However there is an odor to the wound.  Therefore will refill patient's antibiotics from hospital.    10/2: Continue with wound VAC.  Debrided wound to bone today- necrotic sesamoids removed.  Purulent drainage was able to be expressed from joint.  Washed out joint.  Follow up in 1 week.  Continue with antibiotics.         There were no vitals filed for this visit.  Shoe Size:     Past Surgical History:   Procedure Laterality Date    CERVICAL SPINE SURGERY       Past Medical History:   Diagnosis Date    Diabetes mellitus, type 2       Family History   Problem Relation Age of Onset    Diabetes Brother     Diabetes Maternal Grandmother         Social History:   Marital Status: Significant Other  Alcohol History:  reports previous alcohol use.  Tobacco History:  reports that he has never smoked. He has never used smokeless tobacco.  Drug History:  reports no history of drug use.    Review of patient's allergies indicates:  No Known Allergies    Current Outpatient Medications   Medication Sig Dispense Refill    amoxicillin-clavulanate 875-125mg (AUGMENTIN) 875-125 mg per tablet Take 1 tablet by mouth every 12 (twelve) hours. for 16 days 32 tablet 0    blood sugar diagnostic Strp 1 each by Misc.(Non-Drug; Combo Route) route 4 (four) times daily with meals and nightly. 100 each 0    blood-glucose meter (FREESTYLE INSULINX) Misc 1 each by Misc.(Non-Drug; Combo Route) route 4 (four) times daily before meals and nightly. 1 each 0    clindamycin phosphate 1% (CLINDAGEL) 1 % gel Apply topically 2 (two) times daily. 30 g 0    gabapentin (NEURONTIN) 100 MG capsule Take 1 capsule (100 mg total) by mouth 3 (three) times daily. 90 capsule 0    insulin (LANTUS SOLOSTAR U-100 INSULIN) glargine 100 units/mL (3mL) SubQ pen Inject 5 Units into the skin once daily. 3 mL 0    insulin lispro (HUMALOG U-100 INSULIN) 100 unit/mL injection Inject 5 Units into the skin before meals and at bedtime as needed for High Blood Sugar. 4.5 mL 11    lancets Misc 1 each by Misc.(Non-Drug; Combo Route) route 4 (four) times daily before meals and nightly. 100 each 0    levoFLOXacin (LEVAQUIN) 750 MG tablet Take 1 tablet (750 mg total) by mouth once daily. for 20 days 20 tablet 0    metFORMIN (GLUCOPHAGE) 500 MG tablet Take 1 tablet (500 mg total) by mouth 2 (two) times daily with meals. (Patient not taking: Reported on 9/21/2020) 60 tablet 0     No current facility-administered medications for this visit.        Review of Systems      Objective:        Physical Exam:    Foot Exam  Physical Exam    Imaging:            Assessment:       1. Ulcer of right foot, with necrosis of bone    2. Diabetic foot ulcer with osteomyelitis      Plan:   Ulcer of right foot, with necrosis of bone    Diabetic foot ulcer with osteomyelitis      No follow-ups on file.    Procedures - None    Counseling:     I provided patient education verbally regarding:   Patient diagnosis, treatment options, as well as alternatives, risks, and benefits.     This note was created using Dragon voice recognition software that occasionally misinterpreted phrases or words.

## 2020-10-02 NOTE — TELEPHONE ENCOUNTER
Wife/caregiver contacted. She states pt has been vomiting several times per day since he was D/Cd from hospital 1 month ago. Wound care physician has pt on two antibiotics for diabetic foot wound. Saw wound care this AM. Also takes Metformin and insulin. I made appt to see NP on 10/05/2020. Please advise.                  ----- Message from Chery Carter sent at 10/2/2020 12:27 PM CDT -----  Regarding: Pt Message  Contact: [facility] [phone]  Type: Needs Medical Advice  Who Called:  pt wife Ayala Pacheco Call Back Number: work [phone]  Additional Information: wife is requesting a call back in regard to patient vomiting on meds. Please and thank you

## 2020-10-05 ENCOUNTER — OFFICE VISIT (OUTPATIENT)
Dept: FAMILY MEDICINE | Facility: CLINIC | Age: 50
End: 2020-10-05
Payer: COMMERCIAL

## 2020-10-05 ENCOUNTER — PATIENT OUTREACH (OUTPATIENT)
Dept: ADMINISTRATIVE | Facility: HOSPITAL | Age: 50
End: 2020-10-05

## 2020-10-05 VITALS
RESPIRATION RATE: 19 BRPM | WEIGHT: 207 LBS | SYSTOLIC BLOOD PRESSURE: 117 MMHG | HEART RATE: 88 BPM | BODY MASS INDEX: 28.98 KG/M2 | HEIGHT: 71 IN | TEMPERATURE: 98 F | DIASTOLIC BLOOD PRESSURE: 76 MMHG | OXYGEN SATURATION: 97 %

## 2020-10-05 DIAGNOSIS — L97.509 TYPE 2 DIABETES MELLITUS WITH FOOT ULCER, WITHOUT LONG-TERM CURRENT USE OF INSULIN: ICD-10-CM

## 2020-10-05 DIAGNOSIS — K59.00 CONSTIPATION, UNSPECIFIED CONSTIPATION TYPE: ICD-10-CM

## 2020-10-05 DIAGNOSIS — R11.2 NAUSEA AND VOMITING, INTRACTABILITY OF VOMITING NOT SPECIFIED, UNSPECIFIED VOMITING TYPE: ICD-10-CM

## 2020-10-05 DIAGNOSIS — R11.2 NAUSEA AND VOMITING, INTRACTABILITY OF VOMITING NOT SPECIFIED, UNSPECIFIED VOMITING TYPE: Primary | ICD-10-CM

## 2020-10-05 DIAGNOSIS — E11.621 TYPE 2 DIABETES MELLITUS WITH FOOT ULCER, WITHOUT LONG-TERM CURRENT USE OF INSULIN: ICD-10-CM

## 2020-10-05 PROCEDURE — 3008F BODY MASS INDEX DOCD: CPT | Mod: S$GLB,,, | Performed by: NURSE PRACTITIONER

## 2020-10-05 PROCEDURE — 99999 PR PBB SHADOW E&M-EST. PATIENT-LVL IV: CPT | Mod: PBBFAC,,, | Performed by: NURSE PRACTITIONER

## 2020-10-05 PROCEDURE — 3046F PR MOST RECENT HEMOGLOBIN A1C LEVEL > 9.0%: ICD-10-PCS | Mod: S$GLB,,, | Performed by: NURSE PRACTITIONER

## 2020-10-05 PROCEDURE — 3008F PR BODY MASS INDEX (BMI) DOCUMENTED: ICD-10-PCS | Mod: S$GLB,,, | Performed by: NURSE PRACTITIONER

## 2020-10-05 PROCEDURE — 99214 OFFICE O/P EST MOD 30 MIN: CPT | Mod: S$GLB,,, | Performed by: NURSE PRACTITIONER

## 2020-10-05 PROCEDURE — 3046F HEMOGLOBIN A1C LEVEL >9.0%: CPT | Mod: S$GLB,,, | Performed by: NURSE PRACTITIONER

## 2020-10-05 PROCEDURE — 99214 PR OFFICE/OUTPT VISIT, EST, LEVL IV, 30-39 MIN: ICD-10-PCS | Mod: S$GLB,,, | Performed by: NURSE PRACTITIONER

## 2020-10-05 PROCEDURE — 99999 PR PBB SHADOW E&M-EST. PATIENT-LVL IV: ICD-10-PCS | Mod: PBBFAC,,, | Performed by: NURSE PRACTITIONER

## 2020-10-05 RX ORDER — PROMETHAZINE HYDROCHLORIDE 25 MG/1
25 TABLET ORAL EVERY 6 HOURS PRN
Qty: 28 TABLET | Refills: 0 | Status: SHIPPED | OUTPATIENT
Start: 2020-10-05 | End: 2020-10-05 | Stop reason: SDUPTHER

## 2020-10-05 RX ORDER — PROMETHAZINE HYDROCHLORIDE 25 MG/1
25 TABLET ORAL EVERY 6 HOURS PRN
Qty: 28 TABLET | Refills: 0 | Status: SHIPPED | OUTPATIENT
Start: 2020-10-05 | End: 2020-10-12

## 2020-10-05 NOTE — LETTER
October 5, 2020    Garret Munguia  [address] [place] [address]             [facility]  [address]  [place] [address]  Phone: [phone] Dear John [facility] is committed to your overall health and would like to ensure that you are up to date on your recommended test and/or procedures.   Primary Doctor No  has found that your chart shows you may be due for the following:    Health Maintenance Due   Topic Date Due    Hepatitis C Screening  1970    Lipid Panel  1970    Foot Exam  01/06/1980    Eye Exam  01/06/1980    Urine Microalbumin  01/06/1980    HIV Screening  01/06/1985    Pneumococcal Vaccine (Medium Risk) (1 of 1 - PPSV23) 01/06/1989    Low Dose Statin  01/06/1991    Shingles Vaccine (1 of 2) 01/06/2020    Colorectal Cancer Screening  01/06/2020    Influenza Vaccine (1) 08/01/2020     If you have had any of the above done at another facility, please let us know so that we may obtain copies from that facility.  If you have a copy of these records, please provide a copy for us to scan into your chart.  You are welcome to request that the report be faxed to us at  ([phone]).     If you have an upcoming scheduled appointment for the item above, please disregard this letter.    Sincerely,  Your [facility] Team  Primary Doctor Radha Cohen L.P.N. Clinical Care Coordinator  [address] [place] [address], [place] [address] [phone]

## 2020-10-05 NOTE — TELEPHONE ENCOUNTER
----- Message from Kalpana Arzola, Patient Care Assistant sent at 10/5/2020  3:44 PM CDT -----  Regarding: RX sent to wrong pharamcy  Contact: patient  Type: Needs Medical Advice  Who Called:  patient  Symptoms (please be specific):  na promethazine (PHENERGAN) 25 MG tablet   How long has patient had these symptoms:  na  Pharmacy name and phone #:  [facility] [address]  [phone]  Best Call Back Number: [phone] (home) [phone] (work)    Additional Information: patient needs RX sent to above pharmacy , was sent to wrong pharmacy , please call when complete. Thanks.

## 2020-10-05 NOTE — LETTER
October 5, 2020    Garret Munguia  [address] [facility] [place] [address]             [facility]  [address]  [facility] [address]  Phone: [phone] Dear John [facility] is committed to your overall health and would like to ensure that you are up to date on your recommended test and/or procedures. MARIBELL Dela Cruz NP  has found that your chart shows you may be due for the following:    Health Maintenance Due   Topic Date Due    Hepatitis C Screening  1970    Lipid Panel  1970    Foot Exam  01/06/1980    Eye Exam  01/06/1980    Urine Microalbumin  01/06/1980    HIV Screening  01/06/1985    Pneumococcal Vaccine (Medium Risk) (1 of 1 - PPSV23) 01/06/1989    Low Dose Statin  01/06/1991    Shingles Vaccine (1 of 2) 01/06/2020    Colorectal Cancer Screening  01/06/2020    Influenza Vaccine (1) 08/01/2020     If you have had any of the above done at another facility, please let us know so that we may obtain copies from that facility.  If you have a copy of these records, please provide a copy for us to scan into your chart.  You are welcome to request that the report be faxed to us at  ([phone]).     If you have an upcoming scheduled appointment for the item above, please disregard this letter.    Sincerely,  Your [facility]  LANIE Francis L.P.N. Clinical Care Coordinator  [address] [place] [address], [place] [address] [phone]

## 2020-10-05 NOTE — PROGRESS NOTES
"Subjective:       Patient ID: Garret Munguia is a 50 y.o. male.    Chief Complaint: Emesis    Mr. Garret Munguia is a 50 year old male who presents for ongoing nausea and vomiting. Reports seems to be happening less. However, reports it was worse last week. Reports started about a week ago. Reports zofran not effective. Finds that dramamine helps. Continues to take levaquin and augmentin for wound. Wound vac present. Continues to see wound care on Fridays. He reports he has not had a bowel movement in 2 weeks.  Denies fever or chills. Denies abdominal pain. Denies any recent travel.      Review of Systems   Constitutional: Negative for activity change, chills, fatigue and fever.   Respiratory: Negative for cough, chest tightness, shortness of breath and wheezing.    Gastrointestinal: Positive for constipation, nausea and vomiting. Negative for abdominal pain.   Musculoskeletal: Positive for gait problem. Negative for arthralgias.   Skin: Positive for wound. Negative for color change.   Neurological: Negative for dizziness, tremors, weakness and headaches.   Psychiatric/Behavioral: Negative for dysphoric mood and sleep disturbance. The patient is not nervous/anxious.          Reviewed family, medical, surgical, and social history.    Objective:      /76 (BP Location: Left arm, Patient Position: Sitting, BP Method: Medium (Automatic))   Pulse 88   Temp 98.3 °F (36.8 °C) (Tympanic)   Resp 19   Ht 5' 11" (1.803 m)   Wt 93.9 kg (207 lb)   SpO2 97%   BMI 28.87 kg/m²   Physical Exam  Vitals signs reviewed.   Constitutional:       Appearance: Normal appearance.   HENT:      Head: Normocephalic.   Neck:      Musculoskeletal: Normal range of motion.   Cardiovascular:      Rate and Rhythm: Normal rate and regular rhythm.      Pulses: Normal pulses.      Heart sounds: Normal heart sounds. No murmur.   Pulmonary:      Effort: Pulmonary effort is normal.      Breath sounds: No wheezing.   Abdominal:      General: Bowel " sounds are decreased.      Tenderness: There is no abdominal tenderness.   Musculoskeletal: Normal range of motion.   Skin:     General: Skin is warm.      Capillary Refill: Capillary refill takes less than 2 seconds.   Neurological:      Mental Status: He is alert and oriented to person, place, and time.   Psychiatric:         Mood and Affect: Mood normal.         Thought Content: Thought content normal.         Judgment: Judgment normal.         Assessment:       1. Nausea and vomiting, intractability of vomiting not specified, unspecified vomiting type    2. Type 2 diabetes mellitus with foot ulcer, without long-term current use of insulin    3. Constipation, unspecified constipation type        Plan:       Nausea and vomiting, intractability of vomiting not specified, unspecified vomiting type  -     promethazine (PHENERGAN) 25 MG tablet; Take 1 tablet (25 mg total) by mouth every 6 (six) hours as needed for Nausea.  Dispense: 28 tablet; Refill: 0    Type 2 diabetes mellitus with foot ulcer, without long-term current use of insulin  -     Hemoglobin A1C; Future; Expected date: 10/05/2020    Constipation, unspecified constipation type        PLAN:  - Discussed with patient the plan of care  - Obtain labs   - Medications reviewed. Medication side effects discussed. Patient has no questions or concerns at this time. Informed patient to notify me regarding any concerns.   - Continue monitoring CBG    - Informed patient to please notify me with any questions or concerns at anytime  - Follow up ordered for 4 weeks        Risks, benefits, and side effects were discussed with the patient. All questions were answered to the fullest satisfaction of the patient, and pt verbalized understanding and agreement to treatment plan. Pt was to call with any new or worsening symptoms, or present to the ER.

## 2020-10-05 NOTE — PATIENT INSTRUCTIONS
Start taking colace 100 mg twice daily. This is a stool softener.  I would recommend taking magnesium citrate for constipation over the counter   Increase water intake

## 2020-10-06 ENCOUNTER — LAB VISIT (OUTPATIENT)
Dept: LAB | Facility: HOSPITAL | Age: 50
End: 2020-10-06
Attending: NURSE PRACTITIONER
Payer: COMMERCIAL

## 2020-10-06 DIAGNOSIS — L97.509 TYPE 2 DIABETES MELLITUS WITH FOOT ULCER, WITHOUT LONG-TERM CURRENT USE OF INSULIN: ICD-10-CM

## 2020-10-06 DIAGNOSIS — E11.621 TYPE 2 DIABETES MELLITUS WITH FOOT ULCER, WITHOUT LONG-TERM CURRENT USE OF INSULIN: ICD-10-CM

## 2020-10-06 DIAGNOSIS — R11.2 NAUSEA AND VOMITING, INTRACTABILITY OF VOMITING NOT SPECIFIED, UNSPECIFIED VOMITING TYPE: ICD-10-CM

## 2020-10-06 LAB
ALBUMIN SERPL BCP-MCNC: 3.2 G/DL (ref 3.5–5.2)
ALP SERPL-CCNC: 52 U/L (ref 55–135)
ALT SERPL W/O P-5'-P-CCNC: 11 U/L (ref 10–44)
AMYLASE SERPL-CCNC: 21 U/L (ref 20–110)
ANION GAP SERPL CALC-SCNC: 10 MMOL/L (ref 8–16)
AST SERPL-CCNC: 13 U/L (ref 10–40)
BASOPHILS # BLD AUTO: 0.03 K/UL (ref 0–0.2)
BASOPHILS NFR BLD: 0.4 % (ref 0–1.9)
BILIRUB SERPL-MCNC: 0.4 MG/DL (ref 0.1–1)
BUN SERPL-MCNC: 15 MG/DL (ref 6–20)
CALCIUM SERPL-MCNC: 9.2 MG/DL (ref 8.7–10.5)
CHLORIDE SERPL-SCNC: 99 MMOL/L (ref 95–110)
CO2 SERPL-SCNC: 30 MMOL/L (ref 23–29)
CREAT SERPL-MCNC: 1 MG/DL (ref 0.5–1.4)
DIFFERENTIAL METHOD: ABNORMAL
EOSINOPHIL # BLD AUTO: 0.3 K/UL (ref 0–0.5)
EOSINOPHIL NFR BLD: 3 % (ref 0–8)
ERYTHROCYTE [DISTWIDTH] IN BLOOD BY AUTOMATED COUNT: 11.6 % (ref 11.5–14.5)
EST. GFR  (AFRICAN AMERICAN): >60 ML/MIN/1.73 M^2
EST. GFR  (NON AFRICAN AMERICAN): >60 ML/MIN/1.73 M^2
ESTIMATED AVG GLUCOSE: 272 MG/DL (ref 68–131)
GLUCOSE SERPL-MCNC: 151 MG/DL (ref 70–110)
HBA1C MFR BLD HPLC: 11.1 % (ref 4.5–6.2)
HCT VFR BLD AUTO: 37.9 % (ref 40–54)
HGB BLD-MCNC: 11.6 G/DL (ref 14–18)
IMM GRANULOCYTES # BLD AUTO: 0.03 K/UL (ref 0–0.04)
IMM GRANULOCYTES NFR BLD AUTO: 0.4 % (ref 0–0.5)
LIPASE SERPL-CCNC: 16 U/L (ref 4–60)
LYMPHOCYTES # BLD AUTO: 2.5 K/UL (ref 1–4.8)
LYMPHOCYTES NFR BLD: 30.1 % (ref 18–48)
MCH RBC QN AUTO: 26.8 PG (ref 27–31)
MCHC RBC AUTO-ENTMCNC: 30.6 G/DL (ref 32–36)
MCV RBC AUTO: 88 FL (ref 82–98)
MONOCYTES # BLD AUTO: 0.6 K/UL (ref 0.3–1)
MONOCYTES NFR BLD: 7.7 % (ref 4–15)
NEUTROPHILS # BLD AUTO: 4.9 K/UL (ref 1.8–7.7)
NEUTROPHILS NFR BLD: 58.4 % (ref 38–73)
NRBC BLD-RTO: 0 /100 WBC
PLATELET # BLD AUTO: 436 K/UL (ref 150–350)
PMV BLD AUTO: 8.9 FL (ref 9.2–12.9)
POTASSIUM SERPL-SCNC: 4.2 MMOL/L (ref 3.5–5.1)
PROT SERPL-MCNC: 7.2 G/DL (ref 6–8.4)
RBC # BLD AUTO: 4.33 M/UL (ref 4.6–6.2)
SODIUM SERPL-SCNC: 139 MMOL/L (ref 136–145)
WBC # BLD AUTO: 8.34 K/UL (ref 3.9–12.7)

## 2020-10-06 PROCEDURE — 80053 COMPREHEN METABOLIC PANEL: CPT

## 2020-10-06 PROCEDURE — 36415 COLL VENOUS BLD VENIPUNCTURE: CPT

## 2020-10-06 PROCEDURE — 83690 ASSAY OF LIPASE: CPT

## 2020-10-06 PROCEDURE — 85025 COMPLETE CBC W/AUTO DIFF WBC: CPT

## 2020-10-06 PROCEDURE — 82150 ASSAY OF AMYLASE: CPT

## 2020-10-06 PROCEDURE — 83036 HEMOGLOBIN GLYCOSYLATED A1C: CPT

## 2020-10-07 NOTE — PROGRESS NOTES
Please let Mr. Combs know his sugar when he got it checked was 151. A1c decreased from 13 to 11. Pancreas is fine. suspicious of antibiotics causing the n/v. Take phenergan as ordered. F/u in 4 weeks  Thanks

## 2020-10-08 ENCOUNTER — DOCUMENT SCAN (OUTPATIENT)
Dept: HOME HEALTH SERVICES | Facility: HOSPITAL | Age: 50
End: 2020-10-08
Payer: COMMERCIAL

## 2020-10-08 ENCOUNTER — TELEPHONE (OUTPATIENT)
Dept: FAMILY MEDICINE | Facility: CLINIC | Age: 50
End: 2020-10-08

## 2020-10-08 DIAGNOSIS — S61.209A OPEN WOUND OF FINGER, INITIAL ENCOUNTER: Primary | ICD-10-CM

## 2020-10-08 RX ORDER — MUPIROCIN 20 MG/G
OINTMENT TOPICAL 3 TIMES DAILY
Qty: 15 G | Refills: 0 | Status: SHIPPED | OUTPATIENT
Start: 2020-10-08 | End: 2021-06-16

## 2020-10-08 NOTE — TELEPHONE ENCOUNTER
Pt has a blister on the side of his finger that resembles the one he had on his foot (that turned into a large wound). Wife/caregiver is concerned that this one will become like the foot wound. Please advise. Thanks.                ----- Message from Eris Contreras sent at 10/8/2020  7:35 AM CDT -----  Regarding: advice  Contact: wife  Type: Needs Medical Advice  Who Called:  wife-Keri   Symptoms (please be specific):    How long has patient had these symptoms:    Pharmacy name and phone #:    Best Call Back Number: [phone]- cell / [phone] - home / [phone]- work. Additional Information: Garret Munguia 's wife requesting to speak with the nurse, pt has a blister on the finger on the left hand. The wife  is concern, requesting to discuss with the nurse.

## 2020-10-09 ENCOUNTER — OFFICE VISIT (OUTPATIENT)
Dept: WOUND CARE | Facility: HOSPITAL | Age: 50
End: 2020-10-09
Attending: PODIATRIST
Payer: COMMERCIAL

## 2020-10-09 VITALS
HEART RATE: 88 BPM | DIASTOLIC BLOOD PRESSURE: 80 MMHG | SYSTOLIC BLOOD PRESSURE: 128 MMHG | TEMPERATURE: 99 F | RESPIRATION RATE: 17 BRPM

## 2020-10-09 DIAGNOSIS — E11.69 DIABETIC FOOT ULCER WITH OSTEOMYELITIS: ICD-10-CM

## 2020-10-09 DIAGNOSIS — M86.9 DIABETIC FOOT ULCER WITH OSTEOMYELITIS: ICD-10-CM

## 2020-10-09 DIAGNOSIS — L97.514 ULCER OF RIGHT FOOT, WITH NECROSIS OF BONE: Primary | ICD-10-CM

## 2020-10-09 DIAGNOSIS — E11.621 DIABETIC FOOT ULCER WITH OSTEOMYELITIS: ICD-10-CM

## 2020-10-09 DIAGNOSIS — L97.509 DIABETIC FOOT ULCER WITH OSTEOMYELITIS: ICD-10-CM

## 2020-10-09 PROCEDURE — 97605 NEG PRS WND THER DME<=50SQCM: CPT | Performed by: PODIATRIST

## 2020-10-09 PROCEDURE — 11043 DBRDMT MUSC&/FSCA 1ST 20/<: CPT | Performed by: PODIATRIST

## 2020-10-09 NOTE — PROGRESS NOTES
[address] [facility] [address]  [place] [address]  Phone: [phone]   [phone]    Patient's PCP:Primary Doctor No  Referring Provider: No ref. provider found    Subjective:      Chief Complaint:: Wound Care    HPI  metatarsophalangeal joint plantar wound with septic arthritis lasting for approximately 1 month. Onset of the symptoms was blister on bottom of foot.  Patient 1st presented to me while inpatient at [facility] or [facility].  MRI was done which showed joint effusion of the 1st metatarsophalangeal joint, right foot.  Bedside debridement was done down to joint capsule.  This is the 1st follow-up visit since hospital.  We will place wound VAC on.  Excess dead tissue has built up.  Debrided all of this tissue.  Patient has home health who will change dressing on Tuesdays and patient will follow up at wound Care on Fridays.  Patient to follow up in 1 week.  Continue Canelo supplementation, monitoring blood sugars, staying off foot with roller scooter, and increasing protein intake.  Patient is currently working from home.  His work will be contacted by myself in order for this to continue, with patient still receiving full pay/benefits..     9/18:  Continue with wound VAC.  Debrided wound to muscle today.  Follow up in 1 week.  No signs of infection.        9/25:  Continue with wound VAC.  Debrided wound to muscle today.  Follow up in 1 week.  No signs of infection.  However there is an odor to the wound.  Therefore will refill patient's antibiotics from hospital.     10/2: Continue with wound VAC.  Debrided wound to bone today- necrotic sesamoids removed.  Purulent drainage was able to be expressed from joint.  Washed out joint.  Follow up in 1 week.  Continue with antibiotics.      10/9: : Continue with wound VAC.  Debrided wound to muscle today-  Follow up in 1 week.  Continue with antibiotics.   no additional pus was expressed        Vitals:    10/09/20 1308   BP: 128/80   Pulse: 88   Resp: 17   Temp:  98.5 °F (36.9 °C)     Shoe Size:     Past Surgical History:   Procedure Laterality Date    CERVICAL SPINE SURGERY       Past Medical History:   Diagnosis Date    Diabetes mellitus, type 2      Family History   Problem Relation Age of Onset    Diabetes Brother     Diabetes Maternal Grandmother         Social History:   Marital Status: Significant Other  Alcohol History:  reports previous alcohol use.  Tobacco History:  reports that he has never smoked. He has never used smokeless tobacco.  Drug History:  reports no history of drug use.    Review of patient's allergies indicates:  No Known Allergies    Current Outpatient Medications   Medication Sig Dispense Refill    amoxicillin-clavulanate 875-125mg (AUGMENTIN) 875-125 mg per tablet Take 1 tablet by mouth every 12 (twelve) hours. for 16 days 32 tablet 0    blood sugar diagnostic Strp 1 each by Misc.(Non-Drug; Combo Route) route 4 (four) times daily with meals and nightly. 100 each 0    blood-glucose meter (FREESTYLE INSULINX) Misc 1 each by Misc.(Non-Drug; Combo Route) route 4 (four) times daily before meals and nightly. 1 each 0    clindamycin phosphate 1% (CLINDAGEL) 1 % gel Apply topically 2 (two) times daily. 30 g 0    gabapentin (NEURONTIN) 100 MG capsule Take 1 capsule (100 mg total) by mouth 3 (three) times daily. 90 capsule 0    insulin (LANTUS SOLOSTAR U-100 INSULIN) glargine 100 units/mL (3mL) SubQ pen Inject 5 Units into the skin once daily. 3 mL 0    insulin lispro (HUMALOG U-100 INSULIN) 100 unit/mL injection Inject 5 Units into the skin before meals and at bedtime as needed for High Blood Sugar. 4.5 mL 11    lancets Misc 1 each by Misc.(Non-Drug; Combo Route) route 4 (four) times daily before meals and nightly. 100 each 0    levoFLOXacin (LEVAQUIN) 750 MG tablet Take 1 tablet (750 mg total) by mouth once daily. for 20 days 20 tablet 0    metFORMIN (GLUCOPHAGE) 500 MG tablet Take 1 tablet (500 mg total) by mouth 2 (two) times daily with  meals. 60 tablet 0    mupirocin (BACTROBAN) 2 % ointment Apply topically 3 (three) times daily. 15 g 0    ondansetron (ZOFRAN) 8 MG tablet Take 1 tablet (8 mg total) by mouth every 8 (eight) hours as needed for Nausea. 6 tablet 0    promethazine (PHENERGAN) 25 MG tablet Take 1 tablet (25 mg total) by mouth every 6 (six) hours as needed for Nausea. 28 tablet 0     No current facility-administered medications for this visit.        Review of Systems      Objective:        Physical Exam:   Foot Exam  Physical Exam    Imaging:            Assessment:       1. Ulcer of right foot, with necrosis of bone    2. Diabetic foot ulcer with osteomyelitis      Plan:   Ulcer of right foot, with necrosis of bone    Diabetic foot ulcer with osteomyelitis      Follow up in about 1 week (around 10/16/2020).    Procedures - None    Counseling:     I provided patient education verbally regarding:   Patient diagnosis, treatment options, as well as alternatives, risks, and benefits.     This note was created using Dragon voice recognition software that occasionally misinterpreted phrases or words.

## 2020-10-12 ENCOUNTER — TELEPHONE (OUTPATIENT)
Dept: FAMILY MEDICINE | Facility: CLINIC | Age: 50
End: 2020-10-12

## 2020-10-12 NOTE — TELEPHONE ENCOUNTER
----- Message from Brionna Veliz sent at 10/12/2020 10:20 AM CDT -----  Type: Needs Medical Advice  Who Called:  Wife Keri  Symptoms (please be specific):vomiting a couple times daily  How long has patient had these symptoms:  weeks  Pharmacy name and phone #:    [id number] - [place] - [address]  [address]  [facility] [address]  Phone: [phone] Fax: [phone]  Best Call Back Number: [phone]  Additional Information: She said the medication that was prescribed for it is not working

## 2020-10-16 ENCOUNTER — OFFICE VISIT (OUTPATIENT)
Dept: WOUND CARE | Facility: HOSPITAL | Age: 50
End: 2020-10-16
Attending: PODIATRIST
Payer: COMMERCIAL

## 2020-10-16 VITALS
DIASTOLIC BLOOD PRESSURE: 76 MMHG | TEMPERATURE: 97 F | HEART RATE: 76 BPM | RESPIRATION RATE: 17 BRPM | SYSTOLIC BLOOD PRESSURE: 128 MMHG

## 2020-10-16 DIAGNOSIS — L97.509 DIABETIC FOOT ULCER WITH OSTEOMYELITIS: Primary | ICD-10-CM

## 2020-10-16 DIAGNOSIS — E11.621 DIABETIC FOOT ULCER WITH OSTEOMYELITIS: Primary | ICD-10-CM

## 2020-10-16 DIAGNOSIS — M86.9 DIABETIC FOOT ULCER WITH OSTEOMYELITIS: Primary | ICD-10-CM

## 2020-10-16 DIAGNOSIS — E11.69 DIABETIC FOOT ULCER WITH OSTEOMYELITIS: Primary | ICD-10-CM

## 2020-10-16 DIAGNOSIS — L97.514 ULCER OF RIGHT FOOT, WITH NECROSIS OF BONE: ICD-10-CM

## 2020-10-16 PROCEDURE — 11044 PR DEBRIDEMENT, SKIN, SUB-Q TISSUE,MUSCLE,BONE,=<20 SQ CM: ICD-10-PCS | Mod: ,,, | Performed by: PODIATRIST

## 2020-10-16 PROCEDURE — 11044 DBRDMT BONE 1ST 20 SQ CM/<: CPT | Mod: ,,, | Performed by: PODIATRIST

## 2020-10-16 PROCEDURE — 11044 DBRDMT BONE 1ST 20 SQ CM/<: CPT | Performed by: PODIATRIST

## 2020-10-16 PROCEDURE — 97605 NEG PRS WND THER DME<=50SQCM: CPT | Performed by: PODIATRIST

## 2020-10-16 NOTE — PROGRESS NOTES
[address] [facility] [address]  [place] [address]  Phone: [phone]   [phone]    Patient's PCP:Primary Doctor No  Referring Provider: No ref. provider found    Subjective:      Chief Complaint:: Wound Care    HPI  1st metatarsophalangeal joint plantar wound with septic arthritis lasting for approximately 1 month. Onset of the symptoms was blister on bottom of foot.  Patient 1st [place] to me [place] [facility] [place] [facility].  MRI was done which showed joint effusion of the 1st metatarsophalangeal joint, right foot.  Bedside debridement was done down to joint capsule.  This is the 1st follow-up visit since hospital.  We will place wound VAC on.  Excess dead tissue has built up.  Debrided all of this tissue.  Patient has home health who will change dressing on Tuesdays and patient will follow up at wound Care on Fridays.  Patient to follow up in 1 week.  Continue Canelo supplementation, monitoring blood sugars, staying off foot with roller scooter, and increasing protein intake.  Patient is currently working from home.  His work will be contacted by myself in order for this to continue, with patient still receiving full pay/benefits..     9/18:  Continue with wound VAC.  Debrided wound to muscle today.  Follow up in 1 week.  No signs of infection.     9/25:  Continue with wound VAC.  Debrided wound to muscle today.  Follow up in 1 week.  No signs of infection.  However there is an odor to the wound.  Therefore will refill patient's antibiotics from hospital.     10/2: Continue with wound VAC.  Debrided wound to bone today- necrotic sesamoids removed.  Purulent drainage was able to be expressed from joint.  Washed out joint.  Follow up in 1 week.  Continue with antibiotics.       10/9: : Continue with wound VAC.  Debrided wound to muscle today-  Follow up in 1 week.  Continue with antibiotics.   no additional pus was expressed    10/16:  Debrided wound.  Follow up in 1 week.  Although there was no pus  present, wound still has a lingering odor.  Antibiotics orally are being taken based on culture results from hospital.  Recommend patient also see Infectious Disease.  Infectious Disease saw patient while he was in the hospital, however, due to lingering odor,  Would appreciate Infectious Disease recommendations regarding antibiotics or possibly switching to IV antibiotics?  Continue with wound VAC.    There were no vitals filed for this visit.  Shoe Size:     Past Surgical History:   Procedure Laterality Date    CERVICAL SPINE SURGERY       Past Medical History:   Diagnosis Date    Diabetes mellitus, type 2      Family History   Problem Relation Age of Onset    Diabetes Brother     Diabetes Maternal Grandmother         Social History:   Marital Status: Significant Other  Alcohol History:  reports previous alcohol use.  Tobacco History:  reports that he has never smoked. He has never used smokeless tobacco.  Drug History:  reports no history of drug use.    Review of patient's allergies indicates:  No Known Allergies    Current Outpatient Medications   Medication Sig Dispense Refill    blood sugar diagnostic Strp 1 each by Misc.(Non-Drug; Combo Route) route 4 (four) times daily with meals and nightly. 100 each 0    blood-glucose meter (FREESTYLE INSULINX) Misc 1 each by Misc.(Non-Drug; Combo Route) route 4 (four) times daily before meals and nightly. 1 each 0    clindamycin phosphate 1% (CLINDAGEL) 1 % gel Apply topically 2 (two) times daily. 30 g 0    gabapentin (NEURONTIN) 100 MG capsule Take 1 capsule (100 mg total) by mouth 3 (three) times daily. 90 capsule 0    insulin (LANTUS SOLOSTAR U-100 INSULIN) glargine 100 units/mL (3mL) SubQ pen Inject 5 Units into the skin once daily. 3 mL 0    insulin lispro (HUMALOG U-100 INSULIN) 100 unit/mL injection Inject 5 Units into the skin before meals and at bedtime as needed for High Blood Sugar. 4.5 mL 11    lancets Misc 1 each by Misc.(Non-Drug; Combo Route)  route 4 (four) times daily before meals and nightly. 100 each 0    metFORMIN (GLUCOPHAGE) 500 MG tablet Take 1 tablet (500 mg total) by mouth 2 (two) times daily with meals. 60 tablet 0    mupirocin (BACTROBAN) 2 % ointment Apply topically 3 (three) times daily. 15 g 0    ondansetron (ZOFRAN) 8 MG tablet Take 1 tablet (8 mg total) by mouth every 8 (eight) hours as needed for Nausea. 6 tablet 0     No current facility-administered medications for this visit.        Review of Systems      Objective:        Physical Exam:   Foot Exam  Physical Exam    Imaging:            Assessment:       1. Diabetic foot ulcer with osteomyelitis    2. Ulcer of right foot, with necrosis of bone      Plan:   Diabetic foot ulcer with osteomyelitis    Ulcer of right foot, with necrosis of bone  -     Ambulatory referral/consult to Infectious Disease; Future; Expected date: 10/23/2020      No follow-ups on file.    Procedures  No notes on file       Counseling:     I provided patient education verbally regarding:   Patient diagnosis, treatment options, as well as alternatives, risks, and benefits.     This note was created using Dragon voice recognition software that occasionally misinterpreted phrases or words.

## 2020-10-19 ENCOUNTER — TELEPHONE (OUTPATIENT)
Dept: FAMILY MEDICINE | Facility: CLINIC | Age: 50
End: 2020-10-19

## 2020-10-19 ENCOUNTER — TELEPHONE (OUTPATIENT)
Dept: INFECTIOUS DISEASES | Facility: CLINIC | Age: 50
End: 2020-10-19

## 2020-10-19 NOTE — TELEPHONE ENCOUNTER
----- Message from Eris Contreras sent at 10/19/2020  7:24 AM CDT -----  Regarding: advice  Contact: self  Type: Needs Medical Advice  Who Called:  self   Symptoms (please be specific):    How long has patient had these symptoms:    Pharmacy name and phone #:    Best Call Back Number: [phone]  Additional Information: Patient is not feeling well, dizziness, nausea. Patient requesting to come in later today. Patient states he need to be seen today to get diabetic supplies.

## 2020-10-20 ENCOUNTER — TELEPHONE (OUTPATIENT)
Dept: INFECTIOUS DISEASES | Facility: CLINIC | Age: 50
End: 2020-10-20

## 2020-10-21 ENCOUNTER — DOCUMENT SCAN (OUTPATIENT)
Dept: HOME HEALTH SERVICES | Facility: HOSPITAL | Age: 50
End: 2020-10-21
Payer: COMMERCIAL

## 2020-10-22 ENCOUNTER — INITIAL CONSULT (OUTPATIENT)
Dept: INFECTIOUS DISEASES | Facility: CLINIC | Age: 50
End: 2020-10-22
Payer: COMMERCIAL

## 2020-10-22 VITALS
BODY MASS INDEX: 28.98 KG/M2 | SYSTOLIC BLOOD PRESSURE: 113 MMHG | WEIGHT: 207 LBS | OXYGEN SATURATION: 100 % | TEMPERATURE: 98 F | DIASTOLIC BLOOD PRESSURE: 62 MMHG | HEIGHT: 71 IN | HEART RATE: 98 BPM

## 2020-10-22 DIAGNOSIS — L97.514 ULCER OF RIGHT FOOT, WITH NECROSIS OF BONE: ICD-10-CM

## 2020-10-22 DIAGNOSIS — L97.509 TYPE 2 DIABETES MELLITUS WITH FOOT ULCER, WITHOUT LONG-TERM CURRENT USE OF INSULIN: ICD-10-CM

## 2020-10-22 DIAGNOSIS — E11.621 TYPE 2 DIABETES MELLITUS WITH FOOT ULCER, WITHOUT LONG-TERM CURRENT USE OF INSULIN: ICD-10-CM

## 2020-10-22 DIAGNOSIS — Z79.899 ENCOUNTER FOR LONG-TERM (CURRENT) USE OF MEDICATIONS: ICD-10-CM

## 2020-10-22 DIAGNOSIS — M86.171 ACUTE OSTEOMYELITIS OF RIGHT FOOT: Primary | ICD-10-CM

## 2020-10-22 PROCEDURE — 99214 PR OFFICE/OUTPT VISIT, EST, LEVL IV, 30-39 MIN: ICD-10-PCS | Mod: S$GLB,,, | Performed by: INTERNAL MEDICINE

## 2020-10-22 PROCEDURE — 3046F HEMOGLOBIN A1C LEVEL >9.0%: CPT | Mod: S$GLB,,, | Performed by: INTERNAL MEDICINE

## 2020-10-22 PROCEDURE — 99214 OFFICE O/P EST MOD 30 MIN: CPT | Mod: S$GLB,,, | Performed by: INTERNAL MEDICINE

## 2020-10-22 PROCEDURE — 3046F PR MOST RECENT HEMOGLOBIN A1C LEVEL > 9.0%: ICD-10-PCS | Mod: S$GLB,,, | Performed by: INTERNAL MEDICINE

## 2020-10-22 RX ORDER — PROMETHAZINE HYDROCHLORIDE 25 MG/1
TABLET ORAL
COMMUNITY
Start: 2020-10-16 | End: 2021-02-11

## 2020-10-22 RX ORDER — LEVOFLOXACIN 750 MG/1
750 TABLET ORAL DAILY
COMMUNITY
End: 2021-02-11 | Stop reason: SDUPTHER

## 2020-10-22 NOTE — PROGRESS NOTES
"Subjective:       Patient ID: Garret Munguia is a 50 y.o. male.    Chief Complaint:: Wound Infection    HPI  Seen at [facility] 9/5,7"50 y.o. male with uncontrolled diabetes, presented to the emergency room last night for wound infection right foot.  He has peripheral neuropathy and did not recognize any injury.  The blister had been present for 2 and half weeks.  He had been cleaning with peroxide and applying Neosporin.  Plain film was negative for osteomyelitis, wound and blood cultures were obtained, vancomycin and Zosyn were begun in the emergency room.  An MRI was performed which did not show any osteomyelitis but did show a very small effusion in the MTP joint.  He was seen by Dr. Bianca Medrano and she debrided the blister tissue and subcutaneous tissue down to the fascia  and the joint capsule.  The wound had a foul odor but no abscess was encountered.  A wound VAC was ordered.  9/7: cultures from admit have Proteus(pansensitive) and a second GNR. Unsure if home wound vac has been arranged. He has no complaints. '    10/22/20: consult in office. He was discharged on levofloxacin . A few weeks ago augmentin was added but he could not tolerate it. No fever. No sore mouth or diarrhea.  He feels weakness with exertion, but has been chair bound since early september  "10/2 visit with Dr. Medrano: Continue with wound VAC.  Debrided wound to bone today- necrotic sesamoids removed.  Purulent drainage was able to be expressed from joint.  Washed out joint."  No photos are available in Epic. Through communication with Dr. Medrano, his metatarsal head is exposed. I did see a photo(no date) on his p justus.  Blood sugars are improved, A1c down to 11%. Reviewed his BS record on his ph one. Half of the sugars are under 180.     Review of patient's allergies indicates:  No Known Allergies  Past Medical History:   Diagnosis Date    Diabetes mellitus, type 2     Diabetic infection of right foot 09/2020     Past Surgical " "History:   Procedure Laterality Date    CERVICAL SPINE SURGERY       Social History     Tobacco Use    Smoking status: Never Smoker    Smokeless tobacco: Never Used   Substance Use Topics    Alcohol use: Not Currently     Social History     Occupational History    Not on file     Family History   Problem Relation Age of Onset    Diabetes Brother     Diabetes Maternal Grandmother          Review of Systems    Constitutional: No fever, chills, sweats, fatigue,     Eyes: No change in vision,      ENT: No sore throat, mouth pains, or lesions    Cardiovascular: No chest pain,   pedal edema    Respiratory: No shortness of breath, ANGEL,     Gastrointestinal: No abdominal pain, nausea, vomiting, diarrhea,      Genitourinary:      Musculoskeletal: No new pain, joint swelling, or injuries    Integumentary: No new rashes, skin lesions, or wounds    Neurological: No   unusual headaches, neuropathy, , falls    Psychiatric: No anxiety, depression    Endocrine: Blood sugars are better but not well controlled    Lymphatic: No lymphadenopathy, blood loss, anemia, malignancy    VAD:     Objective:      Blood pressure 113/62, pulse 98, temperature 97.7 °F (36.5 °C), temperature source Temporal, height 5' 11" (1.803 m), weight 93.9 kg (207 lb), SpO2 100 %. Body mass index is 28.87 kg/m².  Physical Exam      General: Alert and attentive, cooperative and in no distress    Eyes:  , anicteric, EOMI    Neck: Supple,     ENT:   nares patent, no oral lesions, teeth in good condition, no thrush    Cardiovascular: Regular rate and rhythm, no murmurs, rubs, or gallop    Respiratory: Lungs clear without wheezes, rales, rubs or rhonci    Gastrointestinal:       Genitourinary:      Integumentary: Skin without rashes,      Vascular: No peripheral edema or phlebitis, warm and well perfused    Musculoskeletal:  Using a knee scooter,  no acute arthritis, synovitis or myositis. Normal muscle bulk and strength    Lymphatic:      Neurological: " Normal LOC, cranial nerves, speech,     Psychiatric: Normal mood, speech,  demeanor     Wound:  Wound vac in place    VAD:        Recent Diagnostics:   Reviewed hospital records, cultures, etc  Component 1mo ago  9/27   Aerobic Bacterial Culture Abnormal   PROTEUS MIRABILIS   Moderate     Aerobic Bacterial Culture Abnormal   KLEBSIELLA AEROGENES   Few     Resulting Agency [facility]   Susceptibility     Proteus mirabilis Klebsiella aerogenes     CULTURE, AEROBIC  (SPECIFY SOURCE) CULTURE, AEROBIC  (SPECIFY SOURCE)     Amox/K Clav'ate <=8/4 mcg/mL Sensitive       Amp/Sulbactam <=8/4 mcg/mL Sensitive       Ampicillin <=8 mcg/mL Sensitive       Cefazolin <=2 mcg/mL Sensitive       Cefepime <=2 mcg/mL Sensitive <=2 mcg/mL Sensitive     Ceftriaxone <=1 mcg/mL Sensitive <=1 mcg/mL Sensitive     Ciprofloxacin <=1 mcg/mL Sensitive <=1 mcg/mL Sensitive     Ertapenem <=0.5 mcg/mL Sensitive <=0.5 mcg/mL Sensitive     Gentamicin <=4 mcg/mL Sensitive <=4 mcg/mL Sensitive     Levofloxacin <=2 mcg/mL Sensitive <=2 mcg/mL Sensitive     Meropenem <=1 mcg/mL Sensitive <=1 mcg/mL Sensitive     Piperacillin/Tazo <=16 mcg/mL Sensitive <=16 mcg/mL Sensitive     Tetracycline   <=4 mcg/mL Sensitive     Tobramycin <=4 mcg/mL Sensitive <=4 mcg/mL Sensitive     Trimeth/Sulfa <=2/38 mcg/mL Sensitive <=2/38 mcg/mL Sensitive              Assessment and Plan:           Acute osteomyelitis of right foot    Ulcer of right foot, with necrosis of bone  -     Ambulatory referral/consult to Infectious Disease  -     Midline catheter placement; Future; Expected date: 10/23/2020  -     X-Ray Foot Complete 3 view Right; Future; Expected date: 10/22/2020  -     Ambulatory referral/consult to Infusion Dept; Future; Expected date: 10/23/2020  -     CBC auto differential; Future; Expected date: 10/22/2020  -     Comprehensive Metabolic Panel; Future; Expected date: 10/22/2020  -     C-Reactive Protein; Future; Expected date: 10/22/2020  -     Hemoglobin A1C;  Future; Expected date: 10/22/2020    Type 2 diabetes mellitus with foot ulcer, without long-term current use of insulin  -     Hemoglobin A1C; Future; Expected date: 10/22/2020    Encounter for long-term (current) use of medications      He was reluctant to have a picc but agreed with having a midline. Orders placed tentatively to begin tomorrow  Let me know if you want to proceed with the IV antibiotic, rocephin(ceftriaxone) for home, through a picc line.    We will work on authorization from your insurance company.   We will contact [facility] for which infusion companies are in your area. [facility]. Orders sent    Extended time spent in discussion of options with patient, >30 minutes and in conference with Dr. Medrano    This note was created using Dragon voice recognition software that occasionally misinterpreted phrases or words.

## 2020-10-22 NOTE — PATIENT INSTRUCTIONS
Let me know if you want to proceed with the IV antibiotic, rocephin(ceftriaxone) for home, through a picc line.    We will work on authorization from your insurance company.   We will contact [facility] for which infusion companies are in your area.

## 2020-10-22 NOTE — LETTER
October 22, 2020      Bianca Medrano DPM  [address] [facility]  [address]  [facility]  [place] [address]           [facility]  [address]  [place] [address]  Phone: [phone]  Fax: [phone]          Patient: Garret Munguia   MR Number: [id number]   YOB: 1970   Date of Visit: 10/22/2020       Dear Dr. Bianca Medrano:    Thank you for referring Garret Munguia to me for evaluation. Attached you will find relevant portions of my assessment and plan of care.    If you have questions, please do not hesitate to call me. I look forward to following Garret Munguia along with you.    Sincerely,    Sandra Espino MD    Enclosure  CC:  No Recipients    If you would like to receive this communication electronically, please contact [email or web address] or [phone] to request more information on [employer] Link access.    For providers and/or their staff who would like to refer a patient to [facility], please contact us through our one-stop-shop provider referral line, [facility], at [phone].    If you feel you have received this communication in error or would no longer like to receive these types of communications, please e-mail [email or web address]

## 2020-10-23 ENCOUNTER — TELEPHONE (OUTPATIENT)
Dept: INFECTIOUS DISEASES | Facility: CLINIC | Age: 50
End: 2020-10-23

## 2020-10-23 ENCOUNTER — OFFICE VISIT (OUTPATIENT)
Dept: WOUND CARE | Facility: HOSPITAL | Age: 50
End: 2020-10-23
Attending: PODIATRIST
Payer: COMMERCIAL

## 2020-10-23 VITALS
SYSTOLIC BLOOD PRESSURE: 120 MMHG | DIASTOLIC BLOOD PRESSURE: 70 MMHG | RESPIRATION RATE: 17 BRPM | HEART RATE: 74 BPM | TEMPERATURE: 97 F

## 2020-10-23 DIAGNOSIS — L97.514 ULCER OF RIGHT FOOT, WITH NECROSIS OF BONE: ICD-10-CM

## 2020-10-23 DIAGNOSIS — E11.69 DIABETIC FOOT ULCER WITH OSTEOMYELITIS: Primary | ICD-10-CM

## 2020-10-23 DIAGNOSIS — L97.509 DIABETIC FOOT ULCER WITH OSTEOMYELITIS: Primary | ICD-10-CM

## 2020-10-23 DIAGNOSIS — L97.509 TYPE 2 DIABETES MELLITUS WITH FOOT ULCER, WITHOUT LONG-TERM CURRENT USE OF INSULIN: ICD-10-CM

## 2020-10-23 DIAGNOSIS — E11.621 DIABETIC FOOT ULCER WITH OSTEOMYELITIS: Primary | ICD-10-CM

## 2020-10-23 DIAGNOSIS — E11.621 TYPE 2 DIABETES MELLITUS WITH FOOT ULCER, WITHOUT LONG-TERM CURRENT USE OF INSULIN: ICD-10-CM

## 2020-10-23 DIAGNOSIS — M86.9 DIABETIC FOOT ULCER WITH OSTEOMYELITIS: Primary | ICD-10-CM

## 2020-10-23 DIAGNOSIS — M86.179 OTHER ACUTE OSTEOMYELITIS, UNSPECIFIED ANKLE AND FOOT: ICD-10-CM

## 2020-10-23 PROCEDURE — 87070 CULTURE OTHR SPECIMN AEROBIC: CPT

## 2020-10-23 PROCEDURE — 11044 DBRDMT BONE 1ST 20 SQ CM/<: CPT | Mod: ,,, | Performed by: PODIATRIST

## 2020-10-23 PROCEDURE — 11044 PR DEBRIDEMENT, SKIN, SUB-Q TISSUE,MUSCLE,BONE,=<20 SQ CM: ICD-10-PCS | Mod: ,,, | Performed by: PODIATRIST

## 2020-10-23 PROCEDURE — 87205 SMEAR GRAM STAIN: CPT

## 2020-10-23 PROCEDURE — 11044 DBRDMT BONE 1ST 20 SQ CM/<: CPT | Performed by: PODIATRIST

## 2020-10-23 PROCEDURE — 87206 SMEAR FLUORESCENT/ACID STAI: CPT

## 2020-10-23 PROCEDURE — 87118 MYCOBACTERIC IDENTIFICATION: CPT

## 2020-10-23 PROCEDURE — 87116 MYCOBACTERIA CULTURE: CPT

## 2020-10-23 PROCEDURE — 87102 FUNGUS ISOLATION CULTURE: CPT

## 2020-10-23 PROCEDURE — 87075 CULTR BACTERIA EXCEPT BLOOD: CPT

## 2020-10-23 NOTE — PROGRESS NOTES
[address]  [place] [address]  Phone: [phone]   [phone]    Patient's PCP:Primary Doctor No  Referring Provider: No ref. provider found    Subjective:      Chief Complaint:: Wound Care    HPI  1st metatarsophalangeal joint plantar wound with septic arthritis lasting for approximately 1 month. Onset of the symptoms was blister on bottom of foot.  Patient 1st presented to me while inpatient at [facility] or [facility].  MRI was done which showed joint effusion of the 1st metatarsophalangeal joint, right foot.  Bedside debridement was done down to joint capsule.  This is the 1st follow-up visit since hospital.  We will place wound VAC on.  Excess dead tissue has built up.  Debrided all of this tissue.  Patient has home health who will change dressing on Tuesdays and patient will follow up at wound Care on Fridays.  Patient to follow up in 1 week.  Continue Canelo supplementation, monitoring blood sugars, staying off foot with roller scooter, and increasing protein intake.  Patient is currently working from home.  His work will be contacted by myself in order for this to continue, with patient still receiving full pay/benefits..     9/18:  Continue with wound VAC.  Debrided wound to muscle today.  Follow up in 1 week.  No signs of infection.     9/25:  Continue with wound VAC.  Debrided wound to muscle today.  Follow up in 1 week.  No signs of infection.  However there is an odor to the wound.  Therefore will refill patient's antibiotics from hospital.     10/2: Continue with wound VAC.  Debrided wound to bone today- necrotic sesamoids removed.  Purulent drainage was able to be expressed from joint.  Washed out joint.  Follow up in 1 week.  Continue with antibiotics.       10/9: : Continue with wound VAC.  Debrided wound to muscle today-  Follow up in 1 week.  Continue with antibiotics.   no additional pus was expressed     10/16:  Debrided wound.  Follow up in 1 week.  Although there was no pus  present, wound still has a lingering odor.  Antibiotics orally are being taken based on culture results from hospital.  Recommend patient also see Infectious Disease.  Infectious Disease saw patient while he was in the hospital, however, due to lingering odor,  Would appreciate Infectious Disease recommendations regarding antibiotics or possibly switching to IV antibiotics?  Continue with wound VAC.      10/23:  Debrided wound.  Bone biopsy taken.  MRI ordered.  Patient is is currently refusing IV antibiotics as he is concerned forming a blood clot.  Patient states that blood clots run in his family.  Today the wound has actually improved since last week.  No purulent drainage nor odor.  The swelling in his foot has decreased as well.  Moderate amount of surrounding erythema.  This has also decreased since last week.  Patient has been compliant with taking medications as directed, working on lowering his blood sugar levels, in addition to taking in protein and drinking Canelo daily.  Patient is determined to heal.  Continue with wound VAC.  Follow up in 1 week.  Picture in epic chart.            Vitals:    10/23/20 1336   BP: 120/70   Pulse: 74   Resp: 17   Temp: 97.3 °F (36.3 °C)     Shoe Size:     Past Surgical History:   Procedure Laterality Date    CERVICAL SPINE SURGERY       Past Medical History:   Diagnosis Date    Diabetes mellitus, type 2     Diabetic infection of right foot 09/2020     Family History   Problem Relation Age of Onset    Diabetes Brother     Diabetes Maternal Grandmother         Social History:   Marital Status: Significant Other  Alcohol History:  reports previous alcohol use.  Tobacco History:  reports that he has never smoked. He has never used smokeless tobacco.  Drug History:  reports no history of drug use.    Review of patient's allergies indicates:  No Known Allergies    Current Outpatient Medications   Medication Sig Dispense Refill    blood sugar diagnostic Strp 1 each by  Misc.(Non-Drug; Combo Route) route 4 (four) times daily with meals and nightly. 100 each 0    blood-glucose meter (FREESTYLE INSULINX) Misc 1 each by Misc.(Non-Drug; Combo Route) route 4 (four) times daily before meals and nightly. 1 each 0    clindamycin phosphate 1% (CLINDAGEL) 1 % gel Apply topically 2 (two) times daily. 30 g 0    gabapentin (NEURONTIN) 100 MG capsule Take 1 capsule (100 mg total) by mouth 3 (three) times daily. 90 capsule 0    insulin lispro (HUMALOG U-100 INSULIN) 100 unit/mL injection Inject 5 Units into the skin before meals and at bedtime as needed for High Blood Sugar. 4.5 mL 11    lancets Misc 1 each by Misc.(Non-Drug; Combo Route) route 4 (four) times daily before meals and nightly. 100 each 0    LANTUS SOLOSTAR U-100 INSULIN glargine 100 units/mL (3mL) SubQ pen ADMINISTER 5 UNITS UNDER THE SKIN EVERY DAY 3 mL 0    levoFLOXacin (LEVAQUIN) 750 MG tablet Take 750 mg by mouth once daily.      metFORMIN (GLUCOPHAGE) 500 MG tablet Take 1 tablet (500 mg total) by mouth 2 (two) times daily with meals. 60 tablet 0    mupirocin (BACTROBAN) 2 % ointment Apply topically 3 (three) times daily. 15 g 0    ondansetron (ZOFRAN) 8 MG tablet Take 1 tablet (8 mg total) by mouth every 8 (eight) hours as needed for Nausea. 6 tablet 0    promethazine (PHENERGAN) 25 MG tablet        No current facility-administered medications for this visit.        Review of Systems      Objective:        Physical Exam:   Foot Exam  Physical Exam    Imaging:            Assessment:       1. Diabetic foot ulcer with osteomyelitis    2. Ulcer of right foot, with necrosis of bone    3. Other acute osteomyelitis, unspecified ankle and foot      Plan:   Diabetic foot ulcer with osteomyelitis  -     Aerobic culture  -     Culture, Anaerobic  -     AFB Culture & Smear  -     Fungus culture    Ulcer of right foot, with necrosis of bone  -     Aerobic culture  -     Culture, Anaerobic  -     AFB Culture & Smear  -     Fungus  culture    Other acute osteomyelitis, unspecified ankle and foot  -     MRI Foot (Forefoot) Left Without Contrast; Future; Expected date: 10/23/2020      Follow up in about 1 week (around 10/30/2020).    Procedures  No notes on file       Counseling:     I provided patient education verbally regarding:   Patient diagnosis, treatment options, as well as alternatives, risks, and benefits.     This note was created using Dragon voice recognition software that occasionally misinterpreted phrases or words.

## 2020-10-26 LAB
BACTERIA SPEC ANAEROBE CULT: NORMAL
BACTERIA TISS AEROBE CULT: NORMAL
GRAM STN SPEC: NORMAL
GRAM STN SPEC: NORMAL

## 2020-10-28 RX ORDER — LEVOFLOXACIN 750 MG/1
750 TABLET ORAL DAILY
Qty: 28 TABLET | Refills: 0 | Status: SHIPPED | OUTPATIENT
Start: 2020-10-28 | End: 2020-11-25

## 2020-10-29 ENCOUNTER — DOCUMENT SCAN (OUTPATIENT)
Dept: HOME HEALTH SERVICES | Facility: HOSPITAL | Age: 50
End: 2020-10-29
Payer: COMMERCIAL

## 2020-10-30 ENCOUNTER — OFFICE VISIT (OUTPATIENT)
Dept: WOUND CARE | Facility: HOSPITAL | Age: 50
End: 2020-10-30
Attending: PODIATRIST
Payer: COMMERCIAL

## 2020-10-30 VITALS
HEART RATE: 74 BPM | RESPIRATION RATE: 17 BRPM | TEMPERATURE: 98 F | SYSTOLIC BLOOD PRESSURE: 130 MMHG | DIASTOLIC BLOOD PRESSURE: 74 MMHG

## 2020-10-30 DIAGNOSIS — E11.621 DIABETIC FOOT ULCER WITH OSTEOMYELITIS: Primary | ICD-10-CM

## 2020-10-30 DIAGNOSIS — E11.69 DIABETIC FOOT ULCER WITH OSTEOMYELITIS: Primary | ICD-10-CM

## 2020-10-30 DIAGNOSIS — M86.9 DIABETIC FOOT ULCER WITH OSTEOMYELITIS: Primary | ICD-10-CM

## 2020-10-30 DIAGNOSIS — L97.514 ULCER OF RIGHT FOOT, WITH NECROSIS OF BONE: ICD-10-CM

## 2020-10-30 DIAGNOSIS — L97.509 DIABETIC FOOT ULCER WITH OSTEOMYELITIS: Primary | ICD-10-CM

## 2020-10-30 PROCEDURE — 11042 PR DEBRIDEMENT, SKIN, SUB-Q TISSUE,=<20 SQ CM: ICD-10-PCS | Mod: ,,, | Performed by: PODIATRIST

## 2020-10-30 PROCEDURE — 97605 NEG PRS WND THER DME<=50SQCM: CPT | Performed by: PODIATRIST

## 2020-10-30 PROCEDURE — 11042 DBRDMT SUBQ TIS 1ST 20SQCM/<: CPT | Mod: ,,, | Performed by: PODIATRIST

## 2020-10-30 PROCEDURE — 11042 DBRDMT SUBQ TIS 1ST 20SQCM/<: CPT | Performed by: PODIATRIST

## 2020-10-30 NOTE — PROGRESS NOTES
[address] [facility] [facility] [address]  [place] [address]  Phone: [phone]   [phone]    Patient's PCP:Primary Doctor No  Referring Provider: No ref. provider found    Subjective:      Chief Complaint:: Wound Care    HPI  1st metatarsophalangeal joint plantar wound with septic arthritis lasting for approximately 1 month. Onset of the symptoms was blister on bottom of foot.  Patient 1st presented to me while inpatient at [facility] or [facility].  MRI was done which showed joint effusion of the 1st metatarsophalangeal joint, right foot.  Bedside debridement was done down to joint capsule.  This is the 1st follow-up visit since hospital.  We will place wound VAC on.  Excess dead tissue has built up.  Debrided all of this tissue.  Patient has home health who will change dressing on Tuesdays and patient will follow up at wound Care on Fridays.  Patient to follow up in 1 week.  Continue Canelo supplementation, monitoring blood sugars, staying off foot with roller scooter, and increasing protein intake.  Patient is currently working from home.  His work will be contacted by myself in order for this to continue, with patient still receiving full pay/benefits..     9/18:  Continue with wound VAC.  Debrided wound to muscle today.  Follow up in 1 week.  No signs of infection.     9/25:  Continue with wound VAC.  Debrided wound to muscle today.  Follow up in 1 week.  No signs of infection.  However there is an odor to the wound.  Therefore will refill patient's antibiotics from hospital.     10/2: Continue with wound VAC.  Debrided wound to bone today- necrotic sesamoids removed.  Purulent drainage was able to be expressed from joint.  Washed out joint.  Follow up in 1 week.  Continue with antibiotics.       10/9: : Continue with wound VAC.  Debrided wound to muscle today-  Follow up in 1 week.  Continue with antibiotics.   no additional pus was expressed     10/16:  Debrided wound.  Follow up in 1 week.  Although there was no pus  present, wound still has a lingering odor.  Antibiotics orally are being taken based on culture results from hospital.  Recommend patient also see Infectious Disease.  Infectious Disease saw patient while he was in the hospital, however, due to lingering odor,  Would appreciate Infectious Disease recommendations regarding antibiotics or possibly switching to IV antibiotics?  Continue with wound VAC.        10/23:  Debrided wound.  Bone biopsy taken.  MRI ordered.  Patient is is currently refusing IV antibiotics as he is concerned forming a blood clot.  Patient states that blood clots run in his family.  Today the wound has actually improved since last week.  No purulent drainage nor odor.  The swelling in his foot has decreased as well.  Moderate amount of surrounding erythema.  This has also decreased since last week.  Patient has been compliant with taking medications as directed, working on lowering his blood sugar levels, in addition to taking in protein and drinking Canelo daily.  Patient is determined to heal.  Continue with wound VAC.  Follow up in 1 week.  Picture in epic chart.    10/30:  Debrided wound.  Biopsy results not growing any bacteria.  Patient to continue taking Levaquin.  No signs of infection or odor.  Follow up in 1 week.  Continue with wound VAC.        Vitals:    10/30/20 1328   BP: 130/74   Pulse: 74   Resp: 17   Temp: 97.8 °F (36.6 °C)     Shoe Size:     Past Surgical History:   Procedure Laterality Date    CERVICAL SPINE SURGERY       Past Medical History:   Diagnosis Date    Diabetes mellitus, type 2     Diabetic infection of right foot 09/2020     Family History   Problem Relation Age of Onset    Diabetes Brother     Diabetes Maternal Grandmother         Social History:   Marital Status: Significant Other  Alcohol History:  reports previous alcohol use.  Tobacco History:  reports that he has never smoked. He has never used smokeless tobacco.  Drug History:  reports no history of  drug use.    Review of patient's allergies indicates:  No Known Allergies    Current Outpatient Medications   Medication Sig Dispense Refill    blood sugar diagnostic Strp 1 each by Misc.(Non-Drug; Combo Route) route 4 (four) times daily with meals and nightly. 100 each 0    blood-glucose meter (FREESTYLE INSULINX) Misc 1 each by Misc.(Non-Drug; Combo Route) route 4 (four) times daily before meals and nightly. 1 each 0    clindamycin phosphate 1% (CLINDAGEL) 1 % gel Apply topically 2 (two) times daily. 30 g 0    gabapentin (NEURONTIN) 100 MG capsule Take 1 capsule (100 mg total) by mouth 3 (three) times daily. 90 capsule 0    insulin lispro (HUMALOG U-100 INSULIN) 100 unit/mL injection Inject 5 Units into the skin before meals and at bedtime as needed for High Blood Sugar. 4.5 mL 11    lancets Misc 1 each by Misc.(Non-Drug; Combo Route) route 4 (four) times daily before meals and nightly. 100 each 0    LANTUS SOLOSTAR U-100 INSULIN glargine 100 units/mL (3mL) SubQ pen ADMINISTER 5 UNITS UNDER THE SKIN EVERY DAY 3 mL 0    levoFLOXacin (LEVAQUIN) 750 MG tablet Take 750 mg by mouth once daily.      levoFLOXacin (LEVAQUIN) 750 MG tablet Take 1 tablet (750 mg total) by mouth once daily. 28 tablet 0    metFORMIN (GLUCOPHAGE) 500 MG tablet Take 1 tablet (500 mg total) by mouth 2 (two) times daily with meals. 60 tablet 0    mupirocin (BACTROBAN) 2 % ointment Apply topically 3 (three) times daily. 15 g 0    ondansetron (ZOFRAN) 8 MG tablet Take 1 tablet (8 mg total) by mouth every 8 (eight) hours as needed for Nausea. 6 tablet 0    promethazine (PHENERGAN) 25 MG tablet        No current facility-administered medications for this visit.        Review of Systems      Objective:        Physical Exam:   Foot Exam  Physical Exam    Imaging:            Assessment:       1. Diabetic foot ulcer with osteomyelitis    2. Ulcer of right foot, with necrosis of bone      Plan:   Diabetic foot ulcer with  osteomyelitis    Ulcer of right foot, with necrosis of bone      Follow up in about 1 week (around 11/6/2020).    Procedures  No notes on file       Counseling:     I provided patient education verbally regarding:   Patient diagnosis, treatment options, as well as alternatives, risks, and benefits.     This note was created using Dragon voice recognition software that occasionally misinterpreted phrases or words.

## 2020-11-06 ENCOUNTER — OFFICE VISIT (OUTPATIENT)
Dept: WOUND CARE | Facility: HOSPITAL | Age: 50
End: 2020-11-06
Attending: PODIATRIST
Payer: COMMERCIAL

## 2020-11-06 VITALS
HEART RATE: 84 BPM | DIASTOLIC BLOOD PRESSURE: 76 MMHG | TEMPERATURE: 98 F | SYSTOLIC BLOOD PRESSURE: 130 MMHG | RESPIRATION RATE: 18 BRPM

## 2020-11-06 DIAGNOSIS — L97.509 TYPE 2 DIABETES MELLITUS WITH FOOT ULCER, WITHOUT LONG-TERM CURRENT USE OF INSULIN: ICD-10-CM

## 2020-11-06 DIAGNOSIS — L97.514 ULCER OF RIGHT FOOT, WITH NECROSIS OF BONE: Primary | ICD-10-CM

## 2020-11-06 DIAGNOSIS — E11.621 TYPE 2 DIABETES MELLITUS WITH FOOT ULCER, WITHOUT LONG-TERM CURRENT USE OF INSULIN: ICD-10-CM

## 2020-11-06 PROCEDURE — 97605 NEG PRS WND THER DME<=50SQCM: CPT | Performed by: PODIATRIST

## 2020-11-06 PROCEDURE — 11042 DBRDMT SUBQ TIS 1ST 20SQCM/<: CPT | Mod: ,,, | Performed by: PODIATRIST

## 2020-11-06 PROCEDURE — 11042 PR DEBRIDEMENT, SKIN, SUB-Q TISSUE,=<20 SQ CM: ICD-10-PCS | Mod: ,,, | Performed by: PODIATRIST

## 2020-11-06 PROCEDURE — 11042 DBRDMT SUBQ TIS 1ST 20SQCM/<: CPT | Performed by: PODIATRIST

## 2020-11-06 NOTE — PROGRESS NOTES
[address] [facility] [address]  [place] [address]  Phone: [phone]   [phone]    Patient's PCP:Primary Doctor No  Referring Provider: No ref. provider found    Subjective:      Chief Complaint:: Wound Care    HPI  1st metatarsophalangeal joint plantar wound with septic arthritis lasting for approximately 1 month. Onset of the symptoms was blister on bottom of foot.  Patient 1st presented to me [place] [facility].  MRI was done which showed joint effusion of the 1st metatarsophalangeal joint, right foot.  Bedside debridement was done down to joint capsule.  This is the 1st follow-up visit since hospital.  We will place wound VAC on.  Excess dead tissue has built up.  Debrided all of this tissue.  Patient has home health who will change dressing on Tuesdays and patient will follow up at wound Care on Fridays.  Patient to follow up in 1 week.  Continue Canelo supplementation, monitoring blood sugars, staying off foot with roller scooter, and increasing protein intake.  Patient is currently working from home.  His work will be contacted by myself in order for this to continue, with patient still receiving full pay/benefits..     9/18:  Continue with wound VAC.  Debrided wound to muscle today.  Follow up in 1 week.  No signs of infection.     9/25:  Continue with wound VAC.  Debrided wound to muscle today.  Follow up in 1 week.  No signs of infection.  However there is an odor to the wound.  Therefore will refill patient's antibiotics from hospital.     10/2: Continue with wound VAC.  Debrided wound to bone today- necrotic sesamoids removed.  Purulent drainage was able to be expressed from joint.  Washed out joint.  Follow up in 1 week.  Continue with antibiotics.       10/9: : Continue with wound VAC.  Debrided wound to muscle today-  Follow up in 1 week.  Continue with antibiotics.   no additional pus was expressed     10/16:  Debrided wound.  Follow up in 1 week.  Although there was no pus  present, wound still has a lingering odor.  Antibiotics orally are being taken based on culture results from hospital.  Recommend patient also see Infectious Disease.  Infectious Disease saw patient while he was in the hospital, however, due to lingering odor,  Would appreciate Infectious Disease recommendations regarding antibiotics or possibly switching to IV antibiotics?  Continue with wound VAC.        10/23:  Debrided wound.  Bone biopsy taken.  MRI ordered.  Patient is is currently refusing IV antibiotics as he is concerned forming a blood clot.  Patient states that blood clots run in his family.  Today the wound has actually improved since last week.  No purulent drainage nor odor.  The swelling in his foot has decreased as well.  Moderate amount of surrounding erythema.  This has also decreased since last week.  Patient has been compliant with taking medications as directed, working on lowering his blood sugar levels, in addition to taking in protein and drinking Canelo daily.  Patient is determined to heal.  Continue with wound VAC.  Follow up in 1 week.  Picture in epic chart.     10/30:  Debrided wound.  Biopsy results not growing any bacteria.  Patient to continue taking Levaquin.  No signs of infection or odor.  Follow up in 1 week.  Continue with wound VAC.       11/6:  Debrided wound today.  Wound has significantly improved.  Counseled patient on continuing to stay off of foot an increase protein intake.  Patient has been compliant.  No signs of infection or odor.  Follow up in 1 week.  Continue with wound VAC.    Vitals:    11/06/20 1402   BP: 130/76   Pulse: 84   Resp: 18   Temp: 97.8 °F (36.6 °C)     Shoe Size:     Past Surgical History:   Procedure Laterality Date    CERVICAL SPINE SURGERY       Past Medical History:   Diagnosis Date    Diabetes mellitus, type 2     Diabetic infection of right foot 09/2020     Family History   Problem Relation Age of Onset    Diabetes Brother     Diabetes  Maternal Grandmother         Social History:   Marital Status: Significant Other  Alcohol History:  reports previous alcohol use.  Tobacco History:  reports that he has never smoked. He has never used smokeless tobacco.  Drug History:  reports no history of drug use.    Review of patient's allergies indicates:  No Known Allergies    Current Outpatient Medications   Medication Sig Dispense Refill    blood sugar diagnostic Strp 1 each by Misc.(Non-Drug; Combo Route) route 4 (four) times daily with meals and nightly. 100 each 0    blood-glucose meter (FREESTYLE INSULINX) Misc 1 each by Misc.(Non-Drug; Combo Route) route 4 (four) times daily before meals and nightly. 1 each 0    clindamycin phosphate 1% (CLINDAGEL) 1 % gel Apply topically 2 (two) times daily. 30 g 0    gabapentin (NEURONTIN) 100 MG capsule Take 1 capsule (100 mg total) by mouth 3 (three) times daily. 90 capsule 0    insulin lispro (HUMALOG U-100 INSULIN) 100 unit/mL injection Inject 5 Units into the skin before meals and at bedtime as needed for High Blood Sugar. 4.5 mL 11    lancets Misc 1 each by Misc.(Non-Drug; Combo Route) route 4 (four) times daily before meals and nightly. 100 each 0    LANTUS SOLOSTAR U-100 INSULIN glargine 100 units/mL (3mL) SubQ pen ADMINISTER 5 UNITS UNDER THE SKIN EVERY DAY 3 mL 0    levoFLOXacin (LEVAQUIN) 750 MG tablet Take 750 mg by mouth once daily.      levoFLOXacin (LEVAQUIN) 750 MG tablet Take 1 tablet (750 mg total) by mouth once daily. 28 tablet 0    metFORMIN (GLUCOPHAGE) 500 MG tablet Take 1 tablet (500 mg total) by mouth 2 (two) times daily with meals. 60 tablet 0    mupirocin (BACTROBAN) 2 % ointment Apply topically 3 (three) times daily. 15 g 0    ondansetron (ZOFRAN) 8 MG tablet Take 1 tablet (8 mg total) by mouth every 8 (eight) hours as needed for Nausea. 6 tablet 0    promethazine (PHENERGAN) 25 MG tablet        No current facility-administered medications for this visit.        Review of  Systems      Objective:        Physical Exam:   Foot Exam  Physical Exam    Imaging:            Assessment:       1. Ulcer of right foot, with necrosis of bone    2. Type 2 diabetes mellitus with foot ulcer, without long-term current use of insulin      Plan:   Ulcer of right foot, with necrosis of bone    Type 2 diabetes mellitus with foot ulcer, without long-term current use of insulin      Follow up in about 1 week (around 11/13/2020).    Procedures  No notes on file       Counseling:     I provided patient education verbally regarding:   Patient diagnosis, treatment options, as well as alternatives, risks, and benefits.     This note was created using Dragon voice recognition software that occasionally misinterpreted phrases or words.

## 2020-11-08 PROCEDURE — G0179 MD RECERTIFICATION HHA PT: HCPCS | Mod: ,,, | Performed by: HOSPITALIST

## 2020-11-08 PROCEDURE — G0179 PR HOME HEALTH MD RECERTIFICATION: ICD-10-PCS | Mod: ,,, | Performed by: HOSPITALIST

## 2020-11-09 ENCOUNTER — EXTERNAL HOME HEALTH (OUTPATIENT)
Dept: HOME HEALTH SERVICES | Facility: HOSPITAL | Age: 50
End: 2020-11-09
Payer: COMMERCIAL

## 2020-11-13 ENCOUNTER — OFFICE VISIT (OUTPATIENT)
Dept: WOUND CARE | Facility: HOSPITAL | Age: 50
End: 2020-11-13
Attending: PODIATRIST
Payer: COMMERCIAL

## 2020-11-13 VITALS
RESPIRATION RATE: 17 BRPM | TEMPERATURE: 99 F | DIASTOLIC BLOOD PRESSURE: 74 MMHG | SYSTOLIC BLOOD PRESSURE: 137 MMHG | HEART RATE: 102 BPM

## 2020-11-13 DIAGNOSIS — M86.9 DIABETIC FOOT ULCER WITH OSTEOMYELITIS: ICD-10-CM

## 2020-11-13 DIAGNOSIS — E11.621 TYPE 2 DIABETES MELLITUS WITH FOOT ULCER, WITHOUT LONG-TERM CURRENT USE OF INSULIN: ICD-10-CM

## 2020-11-13 DIAGNOSIS — L97.509 DIABETIC FOOT ULCER WITH OSTEOMYELITIS: ICD-10-CM

## 2020-11-13 DIAGNOSIS — L97.514 ULCER OF RIGHT FOOT, WITH NECROSIS OF BONE: Primary | ICD-10-CM

## 2020-11-13 DIAGNOSIS — E11.621 DIABETIC FOOT ULCER WITH OSTEOMYELITIS: ICD-10-CM

## 2020-11-13 DIAGNOSIS — L97.509 TYPE 2 DIABETES MELLITUS WITH FOOT ULCER, WITHOUT LONG-TERM CURRENT USE OF INSULIN: ICD-10-CM

## 2020-11-13 DIAGNOSIS — E11.69 DIABETIC FOOT ULCER WITH OSTEOMYELITIS: ICD-10-CM

## 2020-11-13 DIAGNOSIS — M86.179 OTHER ACUTE OSTEOMYELITIS, UNSPECIFIED ANKLE AND FOOT: ICD-10-CM

## 2020-11-13 PROCEDURE — 11043 PR DEBRIDEMENT, SKIN, SUB-Q TISSUE,MUSCLE,=<20 SQ CM: ICD-10-PCS | Mod: ,,, | Performed by: PODIATRIST

## 2020-11-13 PROCEDURE — 11043 DBRDMT MUSC&/FSCA 1ST 20/<: CPT | Performed by: PODIATRIST

## 2020-11-13 PROCEDURE — 97605 NEG PRS WND THER DME<=50SQCM: CPT | Performed by: PODIATRIST

## 2020-11-13 PROCEDURE — 11043 DBRDMT MUSC&/FSCA 1ST 20/<: CPT | Mod: ,,, | Performed by: PODIATRIST

## 2020-11-13 NOTE — PROGRESS NOTES
[address] [facility] [address]  [place] [address]  Phone: [phone]   [phone]    Patient's PCP:Primary Doctor No  Referring Provider: No ref. provider found    Subjective:      Chief Complaint:: No chief complaint on file.    HPI  1st metatarsophalangeal joint plantar wound with septic arthritis lasting for approximately 1 month. Onset of the symptoms was blister on bottom of foot.  Patient 1st presented to me while inpatient at [facility] or [facility].  MRI was done which showed joint effusion of the 1st metatarsophalangeal joint, right foot.  Bedside debridement was done down to joint capsule.  This is the 1st follow-up visit since hospital.  We will place wound VAC on.  Excess dead tissue has built up.  Debrided all of this tissue.  Patient has home health who will change dressing on Tuesdays and patient will follow up at wound Care on Fridays.  Patient to follow up in 1 week.  Continue Canelo supplementation, monitoring blood sugars, staying off foot with roller scooter, and increasing protein intake.  Patient is currently working from home.  His work will be contacted by myself in order for this to continue, with patient still receiving full pay/benefits..     9/18:  Continue with wound VAC.  Debrided wound to muscle today.  Follow up in 1 week.  No signs of infection.     9/25:  Continue with wound VAC.  Debrided wound to muscle today.  Follow up in 1 week.  No signs of infection.  However there is an odor to the wound.  Therefore will refill patient's antibiotics from hospital.     10/2: Continue with wound VAC.  Debrided wound to bone today- necrotic sesamoids removed.  Purulent drainage was able to be expressed from joint.  Washed out joint.  Follow up in 1 week.  Continue with antibiotics.       10/9: : Continue with wound VAC.  Debrided wound to muscle today-  Follow up in 1 week.  Continue with antibiotics.   no additional pus was expressed     10/16:  Debrided wound.  Follow up in 1 week.  Although  there was no pus present, wound still has a lingering odor.  Antibiotics orally are being taken based on culture results from hospital.  Recommend patient also see Infectious Disease.  Infectious Disease saw patient while he was in the hospital, however, due to lingering odor,  Would appreciate Infectious Disease recommendations regarding antibiotics or possibly switching to IV antibiotics?  Continue with wound VAC.        10/23:  Debrided wound.  Bone biopsy taken.  MRI ordered.  Patient is is currently refusing IV antibiotics as he is concerned forming a blood clot.  Patient states that blood clots run in his family.  Today the wound has actually improved since last week.  No purulent drainage nor odor.  The swelling in his foot has decreased as well.  Moderate amount of surrounding erythema.  This has also decreased since last week.  Patient has been compliant with taking medications as directed, working on lowering his blood sugar levels, in addition to taking in protein and drinking Canelo daily.  Patient is determined to heal.  Continue with wound VAC.  Follow up in 1 week.  Picture in epic chart.     10/30:  Debrided wound.  Biopsy results not growing any bacteria.  Patient to continue taking Levaquin.  No signs of infection or odor.  Follow up in 1 week.  Continue with wound VAC.            11/6:  Debrided wound today.  Wound has significantly improved.  Counseled patient on continuing to stay off of foot an increase protein intake.  Patient has been compliant.  No signs of infection or odor.  Follow up in 1 week.  Continue with wound VAC.      11/13:  Debrided wound to muscle.  Follow up in 1 week.  Continue to knee with wound VAC.  Wound is improving.       There were no vitals filed for this visit.  Shoe Size:     Past Surgical History:   Procedure Laterality Date    CERVICAL SPINE SURGERY       Past Medical History:   Diagnosis Date    Diabetes mellitus, type 2     Diabetic infection of right foot  09/2020     Family History   Problem Relation Age of Onset    Diabetes Brother     Diabetes Maternal Grandmother         Social History:   Marital Status: Significant Other  Alcohol History:  reports previous alcohol use.  Tobacco History:  reports that he has never smoked. He has never used smokeless tobacco.  Drug History:  reports no history of drug use.    Review of patient's allergies indicates:  No Known Allergies    Current Outpatient Medications   Medication Sig Dispense Refill    blood-glucose meter (FREESTYLE INSULINX) Misc 1 each by Misc.(Non-Drug; Combo Route) route 4 (four) times daily before meals and nightly. 1 each 0    clindamycin phosphate 1% (CLINDAGEL) 1 % gel Apply topically 2 (two) times daily. 30 g 0    FREESTYLE LITE STRIPS Strp TEST SUGAR FOUR TIMES DAILY WITH MEALS AND NIGHTLY 100 strip 0    gabapentin (NEURONTIN) 100 MG capsule Take 1 capsule (100 mg total) by mouth 3 (three) times daily. 90 capsule 0    insulin lispro (HUMALOG U-100 INSULIN) 100 unit/mL injection Inject 5 Units into the skin before meals and at bedtime as needed for High Blood Sugar. 4.5 mL 11    lancets Misc 1 each by Misc.(Non-Drug; Combo Route) route 4 (four) times daily before meals and nightly. 100 each 0    LANTUS SOLOSTAR U-100 INSULIN glargine 100 units/mL (3mL) SubQ pen ADMINISTER 5 UNITS UNDER THE SKIN EVERY DAY 3 mL 0    levoFLOXacin (LEVAQUIN) 750 MG tablet Take 750 mg by mouth once daily.      levoFLOXacin (LEVAQUIN) 750 MG tablet Take 1 tablet (750 mg total) by mouth once daily. 28 tablet 0    metFORMIN (GLUCOPHAGE) 500 MG tablet TAKE 1 TABLET(500 MG) BY MOUTH TWICE DAILY WITH MEALS 60 tablet 0    mupirocin (BACTROBAN) 2 % ointment Apply topically 3 (three) times daily. 15 g 0    ondansetron (ZOFRAN) 8 MG tablet Take 1 tablet (8 mg total) by mouth every 8 (eight) hours as needed for Nausea. 6 tablet 0    promethazine (PHENERGAN) 25 MG tablet        No current facility-administered medications  for this visit.        Review of Systems      Objective:        Physical Exam:   Foot Exam  Physical Exam    Imaging:            Assessment:       1. Ulcer of right foot, with necrosis of bone    2. Type 2 diabetes mellitus with foot ulcer, without long-term current use of insulin    3. Diabetic foot ulcer with osteomyelitis    4. Other acute osteomyelitis, unspecified ankle and foot      Plan:   Ulcer of right foot, with necrosis of bone    Type 2 diabetes mellitus with foot ulcer, without long-term current use of insulin    Diabetic foot ulcer with osteomyelitis    Other acute osteomyelitis, unspecified ankle and foot      No follow-ups on file.    Procedures  No notes on file       Counseling:     I provided patient education verbally regarding:   Patient diagnosis, treatment options, as well as alternatives, risks, and benefits.     This note was created using Dragon voice recognition software that occasionally misinterpreted phrases or words.

## 2020-11-17 ENCOUNTER — TELEPHONE (OUTPATIENT)
Dept: FAMILY MEDICINE | Facility: CLINIC | Age: 50
End: 2020-11-17

## 2020-11-17 DIAGNOSIS — L97.509 TYPE 2 DIABETES MELLITUS WITH FOOT ULCER, WITHOUT LONG-TERM CURRENT USE OF INSULIN: ICD-10-CM

## 2020-11-17 DIAGNOSIS — E11.621 TYPE 2 DIABETES MELLITUS WITH FOOT ULCER, WITHOUT LONG-TERM CURRENT USE OF INSULIN: ICD-10-CM

## 2020-11-17 RX ORDER — BLOOD-GLUCOSE METER
1 KIT MISCELLANEOUS 2 TIMES DAILY PRN
Qty: 200 STRIP | Refills: 0 | Status: SHIPPED | OUTPATIENT
Start: 2020-11-17 | End: 2021-02-11 | Stop reason: SDUPTHER

## 2020-11-17 RX ORDER — INSULIN LISPRO 100 [IU]/ML
5 INJECTION, SOLUTION INTRAVENOUS; SUBCUTANEOUS
Qty: 4.5 ML | Refills: 11
Start: 2020-11-17 | End: 2021-06-16

## 2020-11-17 RX ORDER — INSULIN GLARGINE 100 [IU]/ML
INJECTION, SOLUTION SUBCUTANEOUS
Qty: 3 ML | Refills: 0 | Status: SHIPPED | OUTPATIENT
Start: 2020-11-17 | End: 2021-01-11
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
## 2020-11-17 NOTE — TELEPHONE ENCOUNTER
----- Message from Anne Jolly sent at 11/17/2020  3:50 PM CST -----  Regarding: med correction  Contact: pt  Patient called back he is asking if you will order  2 boxes of the BD AALIYAH Second Generation pen   Needles .  Also he is asking for the FREESTYLE light BLOOD glucose strips .   Patient is asking to please call this in today.    Pharmacy 24 hrs in [facility] on [address]  Patient states he has called several times to get this done.  Call back [phone]

## 2020-11-17 NOTE — TELEPHONE ENCOUNTER
----- Message from Elodia Townsend sent at 11/17/2020  2:44 PM CST -----  Contact: pt  Type:  Patient Returning Call    Who Called:  pt  Who Left Message for Patient:  nurse  Does the patient know what this is regarding?:  yes  Best Call Back Number:  [phone]  Additional Information:  Please Advise

## 2020-11-17 NOTE — TELEPHONE ENCOUNTER
----- Message from Princess LIZETTE Flannery sent at 11/17/2020  3:02 PM CST -----  Contact: pt  Type: Needs Medical Advice  Who Called:  patient     [id number] - [place], [place] - [address]  [address]  [place] [address]  Phone: [phone] Fax: [phone]      Best Call Back Number: [phone] (home) [phone] (work)    Additional Information: please send his Diabetic supply to pharmacy above.

## 2020-11-20 ENCOUNTER — OFFICE VISIT (OUTPATIENT)
Dept: WOUND CARE | Facility: HOSPITAL | Age: 50
End: 2020-11-20
Attending: PODIATRIST
Payer: COMMERCIAL

## 2020-11-20 ENCOUNTER — DOCUMENT SCAN (OUTPATIENT)
Dept: HOME HEALTH SERVICES | Facility: HOSPITAL | Age: 50
End: 2020-11-20
Payer: COMMERCIAL

## 2020-11-20 VITALS
SYSTOLIC BLOOD PRESSURE: 134 MMHG | DIASTOLIC BLOOD PRESSURE: 70 MMHG | TEMPERATURE: 98 F | RESPIRATION RATE: 16 BRPM | HEART RATE: 88 BPM

## 2020-11-20 DIAGNOSIS — E11.621 TYPE 2 DIABETES MELLITUS WITH FOOT ULCER, WITHOUT LONG-TERM CURRENT USE OF INSULIN: ICD-10-CM

## 2020-11-20 DIAGNOSIS — L97.514 ULCER OF RIGHT FOOT, WITH NECROSIS OF BONE: Primary | ICD-10-CM

## 2020-11-20 DIAGNOSIS — M86.9 DIABETIC FOOT ULCER WITH OSTEOMYELITIS: ICD-10-CM

## 2020-11-20 DIAGNOSIS — E11.621 DIABETIC FOOT ULCER WITH OSTEOMYELITIS: ICD-10-CM

## 2020-11-20 DIAGNOSIS — E11.69 DIABETIC FOOT ULCER WITH OSTEOMYELITIS: ICD-10-CM

## 2020-11-20 DIAGNOSIS — L97.509 DIABETIC FOOT ULCER WITH OSTEOMYELITIS: ICD-10-CM

## 2020-11-20 DIAGNOSIS — L97.509 TYPE 2 DIABETES MELLITUS WITH FOOT ULCER, WITHOUT LONG-TERM CURRENT USE OF INSULIN: ICD-10-CM

## 2020-11-20 PROCEDURE — 11042 DBRDMT SUBQ TIS 1ST 20SQCM/<: CPT | Performed by: PODIATRIST

## 2020-11-20 PROCEDURE — 11042 DBRDMT SUBQ TIS 1ST 20SQCM/<: CPT | Mod: ,,, | Performed by: PODIATRIST

## 2020-11-20 PROCEDURE — 97605 NEG PRS WND THER DME<=50SQCM: CPT | Performed by: PODIATRIST

## 2020-11-20 PROCEDURE — 11042 PR DEBRIDEMENT, SKIN, SUB-Q TISSUE,=<20 SQ CM: ICD-10-PCS | Mod: ,,, | Performed by: PODIATRIST

## 2020-11-20 RX ORDER — LEVOFLOXACIN 750 MG/1
750 TABLET ORAL DAILY
Qty: 30 TABLET | Refills: 0 | Status: SHIPPED | OUTPATIENT
Start: 2020-11-20 | End: 2020-12-20

## 2020-11-20 NOTE — PROGRESS NOTES
[address] [facility] [address]  [place] [address]  Phone: [phone]   [phone]    Patient's PCP:Primary Doctor No  Referring Provider: No ref. provider found    Subjective:      Chief Complaint:: Wound Care    HPI  1st metatarsophalangeal joint plantar wound with septic arthritis lasting for approximately 1 month. Onset of the symptoms was blister on bottom of foot.  Patient 1st presented to me while inpatient at [facility] or [facility].  MRI was done which showed joint effusion of the 1st metatarsophalangeal joint, right foot.  Bedside debridement was done down to joint capsule.  This is the 1st follow-up visit since hospital.  We will place wound VAC on.  Excess dead tissue has built up.  Debrided all of this tissue.  Patient has home health who will change dressing on Tuesdays and patient will follow up at wound Care on Fridays.  Patient to follow up in 1 week.  Continue Canelo supplementation, monitoring blood sugars, staying off foot with roller scooter, and increasing protein intake.  Patient is currently working from home.  His work will be contacted by myself in order for this to continue, with patient still receiving full pay/benefits..     9/18:  Continue with wound VAC.  Debrided wound to muscle today.  Follow up in 1 week.  No signs of infection.     9/25:  Continue with wound VAC.  Debrided wound to muscle today.  Follow up in 1 week.  No signs of infection.  However there is an odor to the wound.  Therefore will refill patient's antibiotics from hospital.     10/2: Continue with wound VAC.  Debrided wound to bone today- necrotic sesamoids removed.  Purulent drainage was able to be expressed from joint.  Washed out joint.  Follow up in 1 week.  Continue with antibiotics.       10/9: : Continue with wound VAC.  Debrided wound to muscle today-  Follow up in 1 week.  Continue with antibiotics.   no additional pus was expressed     10/16:  Debrided wound.  Follow up in 1 week.  Although there was no pus  present, wound still has a lingering odor.  Antibiotics orally are being taken based on culture results from hospital.  Recommend patient also see Infectious Disease.  Infectious Disease saw patient while he was in the hospital, however, due to lingering odor,  Would appreciate Infectious Disease recommendations regarding antibiotics or possibly switching to IV antibiotics?  Continue with wound VAC.        10/23:  Debrided wound.  Bone biopsy taken.  MRI ordered.  Patient is is currently refusing IV antibiotics as he is concerned forming a blood clot.  Patient states that blood clots run in his family.  Today the wound has actually improved since last week.  No purulent drainage nor odor.  The swelling in his foot has decreased as well.  Moderate amount of surrounding erythema.  This has also decreased since last week.  Patient has been compliant with taking medications as directed, working on lowering his blood sugar levels, in addition to taking in protein and drinking Canelo daily.  Patient is determined to heal.  Continue with wound VAC.  Follow up in 1 week.  Picture in epic chart.     10/30:  Debrided wound.  Biopsy results not growing any bacteria.  Patient to continue taking Levaquin.  No signs of infection or odor.  Follow up in 1 week.  Continue with wound VAC.              11/6:  Debrided wound today.  Wound has significantly improved.  Counseled patient on continuing to stay off of foot an increase protein intake.  Patient has been compliant.  No signs of infection or odor.  Follow up in 1 week.  Continue with wound VAC.        11/13:  Debrided wound to muscle.  Follow up in 1 week.  Continue to knee with wound VAC.  Wound is improving.    11/20:  Debrided wound to muscle.  Follow up in 2 weeks.  Continue to knee with wound VAC.  Wound is improving.    Vitals:    11/20/20 1148   BP: 134/70   Pulse: 88   Resp: 16   Temp: 97.7 °F (36.5 °C)     Shoe Size:     Past Surgical History:   Procedure Laterality  Date    CERVICAL SPINE SURGERY       Past Medical History:   Diagnosis Date    Diabetes mellitus, type 2     Diabetic infection of right foot 09/2020     Family History   Problem Relation Age of Onset    Diabetes Brother     Diabetes Maternal Grandmother         Social History:   Marital Status: Significant Other  Alcohol History:  reports previous alcohol use.  Tobacco History:  reports that he has never smoked. He has never used smokeless tobacco.  Drug History:  reports no history of drug use.    Review of patient's allergies indicates:  No Known Allergies    Current Outpatient Medications   Medication Sig Dispense Refill    blood sugar diagnostic (FREESTYLE LITE STRIPS) Strp Inject 1 strip into the skin 2 (two) times daily as needed. 200 strip 0    blood-glucose meter (FREESTYLE INSULINX) Misc 1 each by Misc.(Non-Drug; Combo Route) route 4 (four) times daily before meals and nightly. 1 each 0    clindamycin phosphate 1% (CLINDAGEL) 1 % gel Apply topically 2 (two) times daily. 30 g 0    gabapentin (NEURONTIN) 100 MG capsule Take 1 capsule (100 mg total) by mouth 3 (three) times daily. 90 capsule 0    insulin (LANTUS SOLOSTAR U-100 INSULIN) glargine 100 units/mL (3mL) SubQ pen ADMINISTER 5 UNITS UNDER THE SKIN EVERY DAY 3 mL 0    insulin lispro (HUMALOG U-100 INSULIN) 100 unit/mL injection Inject 5 Units into the skin before meals and at bedtime as needed for High Blood Sugar. 4.5 mL 11    lancets Misc 1 each by Misc.(Non-Drug; Combo Route) route 4 (four) times daily before meals and nightly. 100 each 0    levoFLOXacin (LEVAQUIN) 750 MG tablet Take 750 mg by mouth once daily.      levoFLOXacin (LEVAQUIN) 750 MG tablet Take 1 tablet (750 mg total) by mouth once daily. 28 tablet 0    metFORMIN (GLUCOPHAGE) 500 MG tablet TAKE 1 TABLET(500 MG) BY MOUTH TWICE DAILY WITH MEALS 60 tablet 0    mupirocin (BACTROBAN) 2 % ointment Apply topically 3 (three) times daily. 15 g 0    ondansetron (ZOFRAN) 8 MG  tablet Take 1 tablet (8 mg total) by mouth every 8 (eight) hours as needed for Nausea. 6 tablet 0    promethazine (PHENERGAN) 25 MG tablet        No current facility-administered medications for this visit.        Review of Systems      Objective:        Physical Exam:   Foot Exam  Physical Exam    Imaging:            Assessment:       1. Ulcer of right foot, with necrosis of bone    2. Type 2 diabetes mellitus with foot ulcer, without long-term current use of insulin    3. Diabetic foot ulcer with osteomyelitis      Plan:   Ulcer of right foot, with necrosis of bone    Type 2 diabetes mellitus with foot ulcer, without long-term current use of insulin    Diabetic foot ulcer with osteomyelitis      Follow up in about 2 weeks (around 12/4/2020).    Procedures  No notes on file       Counseling:     I provided patient education verbally regarding:   Patient diagnosis, treatment options, as well as alternatives, risks, and benefits.     This note was created using Dragon voice recognition software that occasionally misinterpreted phrases or words.

## 2020-11-24 LAB — FUNGUS SPEC CULT: NORMAL

## 2020-12-04 ENCOUNTER — OFFICE VISIT (OUTPATIENT)
Dept: WOUND CARE | Facility: HOSPITAL | Age: 50
End: 2020-12-04
Attending: PODIATRIST
Payer: COMMERCIAL

## 2020-12-04 VITALS
DIASTOLIC BLOOD PRESSURE: 74 MMHG | TEMPERATURE: 98 F | HEART RATE: 70 BPM | SYSTOLIC BLOOD PRESSURE: 130 MMHG | RESPIRATION RATE: 17 BRPM

## 2020-12-04 DIAGNOSIS — L97.509 TYPE 2 DIABETES MELLITUS WITH FOOT ULCER, WITHOUT LONG-TERM CURRENT USE OF INSULIN: ICD-10-CM

## 2020-12-04 DIAGNOSIS — L97.514 ULCER OF RIGHT FOOT, WITH NECROSIS OF BONE: Primary | ICD-10-CM

## 2020-12-04 DIAGNOSIS — E11.621 TYPE 2 DIABETES MELLITUS WITH FOOT ULCER, WITHOUT LONG-TERM CURRENT USE OF INSULIN: ICD-10-CM

## 2020-12-04 PROCEDURE — 11042 DBRDMT SUBQ TIS 1ST 20SQCM/<: CPT | Mod: ,,, | Performed by: PODIATRIST

## 2020-12-04 PROCEDURE — 97605 NEG PRS WND THER DME<=50SQCM: CPT | Performed by: PODIATRIST

## 2020-12-04 PROCEDURE — 11042 PR DEBRIDEMENT, SKIN, SUB-Q TISSUE,=<20 SQ CM: ICD-10-PCS | Mod: ,,, | Performed by: PODIATRIST

## 2020-12-04 PROCEDURE — 11042 DBRDMT SUBQ TIS 1ST 20SQCM/<: CPT | Performed by: PODIATRIST

## 2020-12-04 NOTE — PROGRESS NOTES
[address] [facility] [address]  [place] [address]  Phone: [phone]   [phone]    Patient's PCP:Primary Doctor No  Referring Provider: No ref. provider found    Subjective:      Chief Complaint:: Wound Care    HPI  1st metatarsophalangeal joint plantar wound with septic arthritis lasting for approximately 1 month. Onset of the symptoms was blister on bottom of foot.  Patient 1st presented to me while inpatient at [facility] or [facility].  MRI was done which showed joint effusion of the 1st metatarsophalangeal joint, right foot.  Bedside debridement was done down to joint capsule.  This is the 1st follow-up visit since hospital.  We will place wound VAC on.  Excess dead tissue has built up.  Debrided all of this tissue.  Patient has home health who will change dressing on Tuesdays and patient will follow up at wound Care on Fridays.  Patient to follow up in 1 week.  Continue Canelo supplementation, monitoring blood sugars, staying off foot with roller scooter, and increasing protein intake.  Patient is currently working from home.  His work will be contacted by myself in order for this to continue, with patient still receiving full pay/benefits..     9/18:  Continue with wound VAC.  Debrided wound to muscle today.  Follow up in 1 week.  No signs of infection.     9/25:  Continue with wound VAC.  Debrided wound to muscle today.  Follow up in 1 week.  No signs of infection.  However there is an odor to the wound.  Therefore will refill patient's antibiotics from hospital.     10/2: Continue with wound VAC.  Debrided wound to bone today- necrotic sesamoids removed.  Purulent drainage was able to be expressed from joint.  Washed out joint.  Follow up in 1 week.  Continue with antibiotics.       10/9: : Continue with wound VAC.  Debrided wound to muscle today-  Follow up in 1 week.  Continue with antibiotics.   no additional pus was expressed     10/16:  Debrided wound.  Follow up in 1 week.  Although there was no pus  present, wound still has a lingering odor.  Antibiotics orally are being taken based on culture results from hospital.  Recommend patient also see Infectious Disease.  Infectious Disease saw patient while he was in the hospital, however, due to lingering odor,  Would appreciate Infectious Disease recommendations regarding antibiotics or possibly switching to IV antibiotics?  Continue with wound VAC.        10/23:  Debrided wound.  Bone biopsy taken.  MRI ordered.  Patient is is currently refusing IV antibiotics as he is concerned forming a blood clot.  Patient states that blood clots run in his family.  Today the wound has actually improved since last week.  No purulent drainage nor odor.  The swelling in his foot has decreased as well.  Moderate amount of surrounding erythema.  This has also decreased since last week.  Patient has been compliant with taking medications as directed, working on lowering his blood sugar levels, in addition to taking in protein and drinking Canelo daily.  Patient is determined to heal.  Continue with wound VAC.  Follow up in 1 week.  Picture in epic chart.     10/30:  Debrided wound.  Biopsy results not growing any bacteria.  Patient to continue taking Levaquin.  No signs of infection or odor.  Follow up in 1 week.  Continue with wound VAC.              11/6:  Debrided wound today.  Wound has significantly improved.  Counseled patient on continuing to stay off of foot an increase protein intake.  Patient has been compliant.  No signs of infection or odor.  Follow up in 1 week.  Continue with wound VAC.        11/13:  Debrided wound to muscle.  Follow up in 1 week.  Continue to knee with wound VAC.  Wound is improving.     11/20:  Debrided wound to muscle.  Follow up in 2 weeks.  Continue to knee with wound VAC.  Wound is improving.    12/4:  Debrided wound.  Follow up in 1 week.  Continue with wound VAC.  Wound is continuing to improve    Vitals:    12/04/20 1243   BP: 130/74   Pulse:  70   Resp: 17   Temp: 97.7 °F (36.5 °C)      Shoe Size:     Past Surgical History:   Procedure Laterality Date    CERVICAL SPINE SURGERY       Past Medical History:   Diagnosis Date    Diabetes mellitus, type 2     Diabetic infection of right foot 09/2020     Family History   Problem Relation Age of Onset    Diabetes Brother     Diabetes Maternal Grandmother         Social History:   Marital Status: Significant Other  Alcohol History:  reports previous alcohol use.  Tobacco History:  reports that he has never smoked. He has never used smokeless tobacco.  Drug History:  reports no history of drug use.    Review of patient's allergies indicates:  No Known Allergies    Current Outpatient Medications   Medication Sig Dispense Refill    blood sugar diagnostic (FREESTYLE LITE STRIPS) Strp Inject 1 strip into the skin 2 (two) times daily as needed. 200 strip 0    blood-glucose meter (FREESTYLE INSULINX) Misc 1 each by Misc.(Non-Drug; Combo Route) route 4 (four) times daily before meals and nightly. 1 each 0    clindamycin phosphate 1% (CLINDAGEL) 1 % gel Apply topically 2 (two) times daily. 30 g 0    gabapentin (NEURONTIN) 100 MG capsule Take 1 capsule (100 mg total) by mouth 3 (three) times daily. 90 capsule 0    insulin (LANTUS SOLOSTAR U-100 INSULIN) glargine 100 units/mL (3mL) SubQ pen ADMINISTER 5 UNITS UNDER THE SKIN EVERY DAY 3 mL 0    insulin lispro (HUMALOG U-100 INSULIN) 100 unit/mL injection Inject 5 Units into the skin before meals and at bedtime as needed for High Blood Sugar. 4.5 mL 11    lancets Misc 1 each by Misc.(Non-Drug; Combo Route) route 4 (four) times daily before meals and nightly. 100 each 0    levoFLOXacin (LEVAQUIN) 750 MG tablet Take 750 mg by mouth once daily.      levoFLOXacin (LEVAQUIN) 750 MG tablet Take 1 tablet (750 mg total) by mouth once daily. 30 tablet 0    metFORMIN (GLUCOPHAGE) 500 MG tablet TAKE 1 TABLET(500 MG) BY MOUTH TWICE DAILY WITH MEALS 60 tablet 0     mupirocin (BACTROBAN) 2 % ointment Apply topically 3 (three) times daily. 15 g 0    ondansetron (ZOFRAN) 8 MG tablet Take 1 tablet (8 mg total) by mouth every 8 (eight) hours as needed for Nausea. 6 tablet 0    promethazine (PHENERGAN) 25 MG tablet        No current facility-administered medications for this visit.        Review of Systems      Objective:        Physical Exam:   Foot Exam  Physical Exam    Imaging:            Assessment:       1. Ulcer of right foot, with necrosis of bone    2. Type 2 diabetes mellitus with foot ulcer, without long-term current use of insulin      Plan:   Ulcer of right foot, with necrosis of bone    Type 2 diabetes mellitus with foot ulcer, without long-term current use of insulin      Follow up in about 1 week (around 12/11/2020).    Procedures  No notes on file       Counseling:     I provided patient education verbally regarding:   Patient diagnosis, treatment options, as well as alternatives, risks, and benefits.     This note was created using Dragon voice recognition software that occasionally misinterpreted phrases or words.

## 2020-12-10 LAB
ACID FAST MOD KINY STN SPEC: NORMAL
MYCOBACTERIUM SPEC QL CULT: NORMAL

## 2020-12-10 RX ORDER — LEVOFLOXACIN 500 MG/1
500 TABLET, FILM COATED ORAL DAILY
Qty: 30 TABLET | Refills: 1 | Status: SHIPPED | OUTPATIENT
Start: 2020-12-10 | End: 2021-01-09

## 2020-12-11 ENCOUNTER — OFFICE VISIT (OUTPATIENT)
Dept: WOUND CARE | Facility: HOSPITAL | Age: 50
End: 2020-12-11
Attending: PODIATRIST
Payer: COMMERCIAL

## 2020-12-11 VITALS — SYSTOLIC BLOOD PRESSURE: 125 MMHG | DIASTOLIC BLOOD PRESSURE: 66 MMHG | TEMPERATURE: 98 F | RESPIRATION RATE: 18 BRPM

## 2020-12-11 DIAGNOSIS — M86.9 DIABETIC FOOT ULCER WITH OSTEOMYELITIS: ICD-10-CM

## 2020-12-11 DIAGNOSIS — L97.509 TYPE 2 DIABETES MELLITUS WITH FOOT ULCER, WITHOUT LONG-TERM CURRENT USE OF INSULIN: ICD-10-CM

## 2020-12-11 DIAGNOSIS — E11.621 DIABETIC FOOT ULCER WITH OSTEOMYELITIS: ICD-10-CM

## 2020-12-11 DIAGNOSIS — E11.621 TYPE 2 DIABETES MELLITUS WITH FOOT ULCER, WITHOUT LONG-TERM CURRENT USE OF INSULIN: ICD-10-CM

## 2020-12-11 DIAGNOSIS — E11.69 DIABETIC FOOT ULCER WITH OSTEOMYELITIS: ICD-10-CM

## 2020-12-11 DIAGNOSIS — M86.179 OTHER ACUTE OSTEOMYELITIS, UNSPECIFIED ANKLE AND FOOT: ICD-10-CM

## 2020-12-11 DIAGNOSIS — L97.514 ULCER OF RIGHT FOOT, WITH NECROSIS OF BONE: Primary | ICD-10-CM

## 2020-12-11 DIAGNOSIS — L97.509 DIABETIC FOOT ULCER WITH OSTEOMYELITIS: ICD-10-CM

## 2020-12-11 PROCEDURE — 1126F PR PAIN SEVERITY QUANTIFIED, NO PAIN PRESENT: ICD-10-PCS | Mod: ,,, | Performed by: PODIATRIST

## 2020-12-11 PROCEDURE — 3046F PR MOST RECENT HEMOGLOBIN A1C LEVEL > 9.0%: ICD-10-PCS | Mod: ,,, | Performed by: PODIATRIST

## 2020-12-11 PROCEDURE — 11042 DBRDMT SUBQ TIS 1ST 20SQCM/<: CPT | Mod: ,,, | Performed by: PODIATRIST

## 2020-12-11 PROCEDURE — 99213 PR OFFICE/OUTPT VISIT, EST, LEVL III, 20-29 MIN: ICD-10-PCS | Mod: 25,,, | Performed by: PODIATRIST

## 2020-12-11 PROCEDURE — 11042 PR DEBRIDEMENT, SKIN, SUB-Q TISSUE,=<20 SQ CM: ICD-10-PCS | Mod: ,,, | Performed by: PODIATRIST

## 2020-12-11 PROCEDURE — 1126F AMNT PAIN NOTED NONE PRSNT: CPT | Mod: ,,, | Performed by: PODIATRIST

## 2020-12-11 PROCEDURE — 99213 OFFICE O/P EST LOW 20 MIN: CPT | Mod: 25,,, | Performed by: PODIATRIST

## 2020-12-11 PROCEDURE — 11042 DBRDMT SUBQ TIS 1ST 20SQCM/<: CPT | Performed by: PODIATRIST

## 2020-12-11 PROCEDURE — 3046F HEMOGLOBIN A1C LEVEL >9.0%: CPT | Mod: ,,, | Performed by: PODIATRIST

## 2020-12-11 NOTE — PROGRESS NOTES
[address] [facility] [address]  [place] [address]  Phone: [phone]   [phone]    Patient's PCP:Primary Doctor No  Referring Provider: No ref. provider found    Subjective:      Chief Complaint:: Wound Care    HPI  1st metatarsophalangeal joint plantar wound with septic arthritis lasting for approximately 1 month. Onset of the symptoms was blister on bottom of foot.  [place] 1st presented [place] [facility] [place] [facility].  MRI was done which showed joint effusion of the 1st metatarsophalangeal joint, right foot.  Bedside debridement was done down to joint capsule.  This is the 1st follow-up visit since hospital.  We will place wound VAC on.  Excess dead tissue has built up.  Debrided all of this tissue.  Patient has home health who will change dressing on Tuesdays and patient will follow up at wound Care on Fridays.  Patient to follow up in 1 week.  Continue Canelo supplementation, monitoring blood sugars, staying off foot with roller scooter, and increasing protein intake.  Patient is currently working from home.  His work will be contacted by myself in order for this to continue, with patient still receiving full pay/benefits..     9/18:  Continue with wound VAC.  Debrided wound to muscle today.  Follow up in 1 week.  No signs of infection.     9/25:  Continue with wound VAC.  Debrided wound to muscle today.  Follow up in 1 week.  No signs of infection.  However there is an odor to the wound.  Therefore will refill patient's antibiotics from hospital.     10/2: Continue with wound VAC.  Debrided wound to bone today- necrotic sesamoids removed.  Purulent drainage was able to be expressed from joint.  Washed out joint.  Follow up in 1 week.  Continue with antibiotics.       10/9: : Continue with wound VAC.  Debrided wound to muscle today-  Follow up in 1 week.  Continue with antibiotics.   no additional pus was expressed     10/16:  Debrided wound.  Follow up in 1 week.  Although there was no pus  present, wound still has a lingering odor.  Antibiotics orally are being taken based on culture results from hospital.  Recommend patient also see Infectious Disease.  Infectious Disease saw patient while he was in the hospital, however, due to lingering odor,  Would appreciate Infectious Disease recommendations regarding antibiotics or possibly switching to IV antibiotics?  Continue with wound VAC.        10/23:  Debrided wound.  Bone biopsy taken.  MRI ordered.  Patient is is currently refusing IV antibiotics as he is concerned forming a blood clot.  Patient states that blood clots run in his family.  Today the wound has actually improved since last week.  No purulent drainage nor odor.  The swelling in his foot has decreased as well.  Moderate amount of surrounding erythema.  This has also decreased since last week.  Patient has been compliant with taking medications as directed, working on lowering his blood sugar levels, in addition to taking in protein and drinking Canelo daily.  Patient is determined to heal.  Continue with wound VAC.  Follow up in 1 week.  Picture in epic chart.     10/30:  Debrided wound.  Biopsy results not growing any bacteria.  Patient to continue taking Levaquin.  No signs of infection or odor.  Follow up in 1 week.  Continue with wound VAC.           11/6:  Debrided wound today.  Wound has significantly improved.  Counseled patient on continuing to stay off of foot an increase protein intake.  Patient has been compliant.  No signs of infection or odor.  Follow up in 1 week.  Continue with wound VAC.        11/13:  Debrided wound to muscle.  Follow up in 1 week.  Continue to knee with wound VAC.  Wound is improving.     11/20:  Debrided wound to muscle.  Follow up in 2 weeks.  Continue to knee with wound VAC.  Wound is improving.     12/4:  Debrided wound.  Follow up in 1 week.  Continue with wound VAC.  Wound is continuing to improve      12/11: Debrided wound.  Follow up in 1 week.   Continue with wound VAC.  Wound is continuing to improve       Full reassessment of patient's wound was performed today, as it has been over 30 days since last assessment. Additionally, patients medical chart was reviewed today for any changes or updates, since last review.   Vitals:    12/11/20 1137   BP: 125/66   Resp: 18   Temp: 98.1 °F (36.7 °C)   TempSrc: Skin   PainSc: 0-No pain      Shoe Size:     Past Surgical History:   Procedure Laterality Date    CERVICAL SPINE SURGERY       Past Medical History:   Diagnosis Date    Diabetes mellitus, type 2     Diabetic infection of right foot 09/2020     Family History   Problem Relation Age of Onset    Diabetes Brother     Diabetes Maternal Grandmother         Social History:   Marital Status: Significant Other  Alcohol History:  reports previous alcohol use.  Tobacco History:  reports that he has never smoked. He has never used smokeless tobacco.  Drug History:  reports no history of drug use.    Review of patient's allergies indicates:  No Known Allergies    Current Outpatient Medications   Medication Sig Dispense Refill    blood sugar diagnostic (FREESTYLE LITE STRIPS) Strp Inject 1 strip into the skin 2 (two) times daily as needed. 200 strip 0    blood-glucose meter (FREESTYLE INSULINX) Misc 1 each by Misc.(Non-Drug; Combo Route) route 4 (four) times daily before meals and nightly. 1 each 0    clindamycin phosphate 1% (CLINDAGEL) 1 % gel Apply topically 2 (two) times daily. 30 g 0    gabapentin (NEURONTIN) 100 MG capsule Take 1 capsule (100 mg total) by mouth 3 (three) times daily. 90 capsule 0    insulin (LANTUS SOLOSTAR U-100 INSULIN) glargine 100 units/mL (3mL) SubQ pen ADMINISTER 5 UNITS UNDER THE SKIN EVERY DAY 3 mL 0    insulin lispro (HUMALOG U-100 INSULIN) 100 unit/mL injection Inject 5 Units into the skin before meals and at bedtime as needed for High Blood Sugar. 4.5 mL 11    lancets Misc 1 each by Misc.(Non-Drug; Combo Route) route 4 (four)  times daily before meals and nightly. 100 each 0    levoFLOXacin (LEVAQUIN) 500 MG tablet Take 1 tablet (500 mg total) by mouth once daily. 30 tablet 1    levoFLOXacin (LEVAQUIN) 750 MG tablet Take 750 mg by mouth once daily.      levoFLOXacin (LEVAQUIN) 750 MG tablet Take 1 tablet (750 mg total) by mouth once daily. 30 tablet 0    metFORMIN (GLUCOPHAGE) 500 MG tablet TAKE 1 TABLET(500 MG) BY MOUTH TWICE DAILY WITH MEALS 60 tablet 0    mupirocin (BACTROBAN) 2 % ointment Apply topically 3 (three) times daily. 15 g 0    ondansetron (ZOFRAN) 8 MG tablet Take 1 tablet (8 mg total) by mouth every 8 (eight) hours as needed for Nausea. 6 tablet 0    promethazine (PHENERGAN) 25 MG tablet        No current facility-administered medications for this visit.        Review of Systems      Objective:        Physical Exam:   Foot Exam  Physical Exam    Imaging:            Assessment:       1. Ulcer of right foot, with necrosis of bone    2. Type 2 diabetes mellitus with foot ulcer, without long-term current use of insulin    3. Diabetic foot ulcer with osteomyelitis    4. Other acute osteomyelitis, unspecified ankle and foot      Plan:   Ulcer of right foot, with necrosis of bone    Type 2 diabetes mellitus with foot ulcer, without long-term current use of insulin    Diabetic foot ulcer with osteomyelitis    Other acute osteomyelitis, unspecified ankle and foot      No follow-ups on file.    Procedures  No notes on file       Counseling:     I provided patient education verbally regarding:   Patient diagnosis, treatment options, as well as alternatives, risks, and benefits.     This note was created using Dragon voice recognition software that occasionally misinterpreted phrases or words.

## 2020-12-18 ENCOUNTER — OFFICE VISIT (OUTPATIENT)
Dept: WOUND CARE | Facility: HOSPITAL | Age: 50
End: 2020-12-18
Attending: PODIATRIST
Payer: COMMERCIAL

## 2020-12-18 VITALS
HEART RATE: 82 BPM | RESPIRATION RATE: 18 BRPM | DIASTOLIC BLOOD PRESSURE: 62 MMHG | SYSTOLIC BLOOD PRESSURE: 135 MMHG | TEMPERATURE: 100 F

## 2020-12-18 DIAGNOSIS — L97.509 TYPE 2 DIABETES MELLITUS WITH FOOT ULCER, WITHOUT LONG-TERM CURRENT USE OF INSULIN: ICD-10-CM

## 2020-12-18 DIAGNOSIS — E11.621 TYPE 2 DIABETES MELLITUS WITH FOOT ULCER, WITHOUT LONG-TERM CURRENT USE OF INSULIN: ICD-10-CM

## 2020-12-18 DIAGNOSIS — L97.514 ULCER OF RIGHT FOOT, WITH NECROSIS OF BONE: Primary | ICD-10-CM

## 2020-12-18 PROCEDURE — 1126F PR PAIN SEVERITY QUANTIFIED, NO PAIN PRESENT: ICD-10-PCS | Mod: ,,, | Performed by: PODIATRIST

## 2020-12-18 PROCEDURE — 1126F AMNT PAIN NOTED NONE PRSNT: CPT | Mod: ,,, | Performed by: PODIATRIST

## 2020-12-18 PROCEDURE — 11042 DBRDMT SUBQ TIS 1ST 20SQCM/<: CPT | Mod: ,,, | Performed by: PODIATRIST

## 2020-12-18 PROCEDURE — 11042 PR DEBRIDEMENT, SKIN, SUB-Q TISSUE,=<20 SQ CM: ICD-10-PCS | Mod: ,,, | Performed by: PODIATRIST

## 2020-12-18 PROCEDURE — 97605 NEG PRS WND THER DME<=50SQCM: CPT | Performed by: PODIATRIST

## 2020-12-18 PROCEDURE — 11042 DBRDMT SUBQ TIS 1ST 20SQCM/<: CPT | Performed by: PODIATRIST

## 2020-12-18 NOTE — PROGRESS NOTES
[address] [facility] [address]  [place] [address]  Phone: [phone]   [phone]    Patient's PCP:Primary Doctor No  Referring Provider: No ref. provider found    Subjective:      Chief Complaint:: Wound Care    HPI  1st metatarsophalangeal joint plantar wound with septic arthritis lasting for approximately 1 month. Onset of the symptoms was blister on bottom of foot.  Patient 1st presented to me while inpatient at [facility] or [facility].  MRI was done which showed joint effusion of the 1st metatarsophalangeal joint, right foot.  Bedside debridement was done down to joint capsule.  This is the 1st follow-up visit since hospital.  We will place wound VAC on.  Excess dead tissue has built up.  Debrided all of this tissue.  Patient has home health who will change dressing on Tuesdays and patient will follow up at wound Care on Fridays.  Patient to follow up in 1 week.  Continue Canelo supplementation, monitoring blood sugars, staying off foot with roller scooter, and increasing protein intake.  Patient is currently working from home.  His work will be contacted by myself in order for this to continue, with patient still receiving full pay/benefits..     9/18:  Continue with wound VAC.  Debrided wound to muscle today.  Follow up in 1 week.  No signs of infection.     9/25:  Continue with wound VAC.  Debrided wound to muscle today.  Follow up in 1 week.  No signs of infection.  However there is an odor to the wound.  Therefore will refill patient's antibiotics from hospital.     10/2: Continue with wound VAC.  Debrided wound to bone today- necrotic sesamoids removed.  Purulent drainage was able to be expressed from joint.  Washed out joint.  Follow up in 1 week.  Continue with antibiotics.       10/9: : Continue with wound VAC.  Debrided wound to muscle today-  Follow up in 1 week.  Continue with antibiotics.   no additional pus was expressed     10/16:  Debrided wound.  Follow up in 1 week.  Although there was no pus  present, wound still has a lingering odor.  Antibiotics orally are being taken based on culture results from hospital.  Recommend patient also see Infectious Disease.  Infectious Disease saw patient while he was in the hospital, however, due to lingering odor,  Would appreciate Infectious Disease recommendations regarding antibiotics or possibly switching to IV antibiotics?  Continue with wound VAC.        10/23:  Debrided wound.  Bone biopsy taken.  MRI ordered.  Patient is is currently refusing IV antibiotics as he is concerned forming a blood clot.  Patient states that blood clots run in his family.  Today the wound has actually improved since last week.  No purulent drainage nor odor.  The swelling in his foot has decreased as well.  Moderate amount of surrounding erythema.  This has also decreased since last week.  Patient has been compliant with taking medications as directed, working on lowering his blood sugar levels, in addition to taking in protein and drinking Canelo daily.  Patient is determined to heal.  Continue with wound VAC.  Follow up in 1 week.  Picture in epic chart.     10/30:  Debrided wound.  Biopsy results not growing any bacteria.  Patient to continue taking Levaquin.  No signs of infection or odor.  Follow up in 1 week.  Continue with wound VAC.     11/6:  Debrided wound today.  Wound has significantly improved.  Counseled patient on continuing to stay off of foot an increase protein intake.  Patient has been compliant.  No signs of infection or odor.  Follow up in 1 week.  Continue with wound VAC.        11/13:  Debrided wound to muscle.  Follow up in 1 week.  Continue to knee with wound VAC.  Wound is improving.     11/20:  Debrided wound to muscle.  Follow up in 2 weeks.  Continue to knee with wound VAC.  Wound is improving.     12/4:  Debrided wound.  Follow up in 1 week.  Continue with wound VAC.  Wound is continuing to improve        12/11: Debrided wound.  Follow up in 1 week.   Continue with wound VAC.  Wound is continuing to improve    12/18:  Debrided wound.  Follow up in 2 weeks.  Continue with wound VAC.  Wound is continuing to improve     Full reassessment of patient's wound was performed today, as it has been over 30 days since last assessment. Additionally, patients medical chart was reviewed today for any changes or updates, since last review.       Vitals:    12/18/20 1125   BP: 135/62   Pulse: 82   Resp: 18   Temp: 99.5 °F (37.5 °C)   TempSrc: Skin   PainSc: 0-No pain      Shoe Size:     Past Surgical History:   Procedure Laterality Date    CERVICAL SPINE SURGERY       Past Medical History:   Diagnosis Date    Diabetes mellitus, type 2     Diabetic infection of right foot 09/2020     Family History   Problem Relation Age of Onset    Diabetes Brother     Diabetes Maternal Grandmother         Social History:   Marital Status: Significant Other  Alcohol History:  reports previous alcohol use.  Tobacco History:  reports that he has never smoked. He has never used smokeless tobacco.  Drug History:  reports no history of drug use.    Review of patient's allergies indicates:  No Known Allergies    Current Outpatient Medications   Medication Sig Dispense Refill    blood sugar diagnostic (FREESTYLE LITE STRIPS) Strp Inject 1 strip into the skin 2 (two) times daily as needed. 200 strip 0    blood-glucose meter (FREESTYLE INSULINX) Misc 1 each by Misc.(Non-Drug; Combo Route) route 4 (four) times daily before meals and nightly. 1 each 0    clindamycin phosphate 1% (CLINDAGEL) 1 % gel Apply topically 2 (two) times daily. 30 g 0    gabapentin (NEURONTIN) 100 MG capsule Take 1 capsule (100 mg total) by mouth 3 (three) times daily. 90 capsule 0    insulin (LANTUS SOLOSTAR U-100 INSULIN) glargine 100 units/mL (3mL) SubQ pen ADMINISTER 5 UNITS UNDER THE SKIN EVERY DAY 3 mL 0    insulin lispro (HUMALOG U-100 INSULIN) 100 unit/mL injection Inject 5 Units into the skin before meals and at  bedtime as needed for High Blood Sugar. 4.5 mL 11    lancets Misc 1 each by Misc.(Non-Drug; Combo Route) route 4 (four) times daily before meals and nightly. 100 each 0    levoFLOXacin (LEVAQUIN) 500 MG tablet Take 1 tablet (500 mg total) by mouth once daily. 30 tablet 1    levoFLOXacin (LEVAQUIN) 750 MG tablet Take 750 mg by mouth once daily.      levoFLOXacin (LEVAQUIN) 750 MG tablet Take 1 tablet (750 mg total) by mouth once daily. 30 tablet 0    metFORMIN (GLUCOPHAGE) 500 MG tablet TAKE 1 TABLET(500 MG) BY MOUTH TWICE DAILY WITH MEALS 60 tablet 0    mupirocin (BACTROBAN) 2 % ointment Apply topically 3 (three) times daily. 15 g 0    ondansetron (ZOFRAN) 8 MG tablet Take 1 tablet (8 mg total) by mouth every 8 (eight) hours as needed for Nausea. 6 tablet 0    promethazine (PHENERGAN) 25 MG tablet        No current facility-administered medications for this visit.        Review of Systems      Objective:        Physical Exam:   Foot Exam  Physical Exam    Imaging:            Assessment:       1. Ulcer of right foot, with necrosis of bone    2. Type 2 diabetes mellitus with foot ulcer, without long-term current use of insulin      Plan:   Ulcer of right foot, with necrosis of bone    Type 2 diabetes mellitus with foot ulcer, without long-term current use of insulin      Follow up in about 11 days (around 12/29/2020).    Procedures  No notes on file       Counseling:     I provided patient education verbally regarding:   Patient diagnosis, treatment options, as well as alternatives, risks, and benefits.     This note was created using Dragon voice recognition software that occasionally misinterpreted phrases or words.

## 2020-12-29 ENCOUNTER — OFFICE VISIT (OUTPATIENT)
Dept: WOUND CARE | Facility: HOSPITAL | Age: 50
End: 2020-12-29
Attending: PODIATRIST
Payer: COMMERCIAL

## 2020-12-29 DIAGNOSIS — E11.69 DIABETIC FOOT ULCER WITH OSTEOMYELITIS: ICD-10-CM

## 2020-12-29 DIAGNOSIS — L97.509 DIABETIC FOOT ULCER WITH OSTEOMYELITIS: ICD-10-CM

## 2020-12-29 DIAGNOSIS — E11.621 DIABETIC FOOT ULCER WITH OSTEOMYELITIS: ICD-10-CM

## 2020-12-29 DIAGNOSIS — E11.621 TYPE 2 DIABETES MELLITUS WITH FOOT ULCER, WITHOUT LONG-TERM CURRENT USE OF INSULIN: ICD-10-CM

## 2020-12-29 DIAGNOSIS — L97.509 TYPE 2 DIABETES MELLITUS WITH FOOT ULCER, WITHOUT LONG-TERM CURRENT USE OF INSULIN: ICD-10-CM

## 2020-12-29 DIAGNOSIS — M86.9 DIABETIC FOOT ULCER WITH OSTEOMYELITIS: ICD-10-CM

## 2020-12-29 DIAGNOSIS — L97.514 ULCER OF RIGHT FOOT, WITH NECROSIS OF BONE: Primary | ICD-10-CM

## 2020-12-29 PROCEDURE — 11042 DBRDMT SUBQ TIS 1ST 20SQCM/<: CPT | Mod: ,,, | Performed by: PODIATRIST

## 2020-12-29 PROCEDURE — 11042 DBRDMT SUBQ TIS 1ST 20SQCM/<: CPT | Performed by: PODIATRIST

## 2020-12-29 PROCEDURE — 11042 PR DEBRIDEMENT, SKIN, SUB-Q TISSUE,=<20 SQ CM: ICD-10-PCS | Mod: ,,, | Performed by: PODIATRIST

## 2020-12-29 NOTE — PROGRESS NOTES
[address] [facility] [address]  [place] [address]  Phone: [phone]   [phone]    Patient's PCP:Primary Doctor No  Referring Provider: No ref. provider found    Subjective:      Chief Complaint:: No chief complaint on file.    HPI  1st metatarsophalangeal joint plantar wound with septic arthritis lasting for approximately 1 month. Onset of the symptoms was blister on bottom of foot.  Patient 1st presented to me while inpatient at [facility] or [facility].  MRI was done which showed joint effusion of the 1st metatarsophalangeal joint, right foot.  Bedside debridement was done down to joint capsule.  This is the 1st follow-up visit since hospital.  We will place wound VAC on.  Excess dead tissue has built up.  Debrided all of this tissue.  Patient has home health who will change dressing on Tuesdays and patient will follow up at wound Care on Fridays.  Patient to follow up in 1 week.  Continue Canelo supplementation, monitoring blood sugars, staying off foot with roller scooter, and increasing protein intake.  Patient is currently working from home.  His work will be contacted by myself in order for this to continue, with patient still receiving full pay/benefits..     9/18:  Continue with wound VAC.  Debrided wound to muscle today.  Follow up in 1 week.  No signs of infection.     9/25:  Continue with wound VAC.  Debrided wound to muscle today.  Follow up in 1 week.  No signs of infection.  However there is an odor to the wound.  Therefore will refill patient's antibiotics from hospital.     10/2: Continue with wound VAC.  Debrided wound to bone today- necrotic sesamoids removed.  Purulent drainage was able to be expressed from joint.  Washed out joint.  Follow up in 1 week.  Continue with antibiotics.       10/9: : Continue with wound VAC.  Debrided wound to muscle today-  Follow up in 1 week.  Continue with antibiotics.   no additional pus was expressed     10/16:  Debrided wound.  Follow up in 1 week.  Although  there was no pus present, wound still has a lingering odor.  Antibiotics orally are being taken based on culture results from hospital.  Recommend patient also see Infectious Disease.  Infectious Disease saw patient while he was in the hospital, however, due to lingering odor,  Would appreciate Infectious Disease recommendations regarding antibiotics or possibly switching to IV antibiotics?  Continue with wound VAC.        10/23:  Debrided wound.  Bone biopsy taken.  MRI ordered.  Patient is is currently refusing IV antibiotics as he is concerned forming a blood clot.  Patient states that blood clots run in his family.  Today the wound has actually improved since last week.  No purulent drainage nor odor.  The swelling in his foot has decreased as well.  Moderate amount of surrounding erythema.  This has also decreased since last week.  Patient has been compliant with taking medications as directed, working on lowering his blood sugar levels, in addition to taking in protein and drinking Canelo daily.  Patient is determined to heal.  Continue with wound VAC.  Follow up in 1 week.  Picture in epic chart.     10/30:  Debrided wound.  Biopsy results not growing any bacteria.  Patient to continue taking Levaquin.  No signs of infection or odor.  Follow up in 1 week.  Continue with wound VAC.     11/6:  Debrided wound today.  Wound has significantly improved.  Counseled patient on continuing to stay off of foot an increase protein intake.  Patient has been compliant.  No signs of infection or odor.  Follow up in 1 week.  Continue with wound VAC.        11/13:  Debrided wound to muscle.  Follow up in 1 week.  Continue to knee with wound VAC.  Wound is improving.     11/20:  Debrided wound to muscle.  Follow up in 2 weeks.  Continue to knee with wound VAC.  Wound is improving.     12/4:  Debrided wound.  Follow up in 1 week.  Continue with wound VAC.  Wound is continuing to improve        12/11: Debrided wound.  Follow up  in 1 week.  Continue with wound VAC.  Wound is continuing to improve.      12/18:  Debrided wound.  Follow up in 2 weeks.  Continue with wound VAC.  Wound is continuing to improve.     12/29: Debrided wound.  Follow up in 1 week.  Switch to Promogran Olivia 3 times weekly.       Full reassessment of patient's wound was performed today, as it has been over 30 days since last assessment. Additionally, patients medical chart was reviewed today for any changes or updates, since last review.     There were no vitals filed for this visit.   Shoe Size:     Past Surgical History:   Procedure Laterality Date    CERVICAL SPINE SURGERY       Past Medical History:   Diagnosis Date    Diabetes mellitus, type 2     Diabetic infection of right foot 09/2020     Family History   Problem Relation Age of Onset    Diabetes Brother     Diabetes Maternal Grandmother         Social History:   Marital Status: Significant Other  Alcohol History:  reports previous alcohol use.  Tobacco History:  reports that he has never smoked. He has never used smokeless tobacco.  Drug History:  reports no history of drug use.    Review of patient's allergies indicates:  No Known Allergies    Current Outpatient Medications   Medication Sig Dispense Refill    blood sugar diagnostic (FREESTYLE LITE STRIPS) Strp Inject 1 strip into the skin 2 (two) times daily as needed. 200 strip 0    blood-glucose meter (FREESTYLE INSULINX) Misc 1 each by Misc.(Non-Drug; Combo Route) route 4 (four) times daily before meals and nightly. 1 each 0    clindamycin phosphate 1% (CLINDAGEL) 1 % gel Apply topically 2 (two) times daily. 30 g 0    gabapentin (NEURONTIN) 100 MG capsule Take 1 capsule (100 mg total) by mouth 3 (three) times daily. 90 capsule 0    insulin (LANTUS SOLOSTAR U-100 INSULIN) glargine 100 units/mL (3mL) SubQ pen ADMINISTER 5 UNITS UNDER THE SKIN EVERY DAY 3 mL 0    insulin lispro (HUMALOG U-100 INSULIN) 100 unit/mL injection Inject 5 Units into the  skin before meals and at bedtime as needed for High Blood Sugar. 4.5 mL 11    lancets Misc 1 each by Misc.(Non-Drug; Combo Route) route 4 (four) times daily before meals and nightly. 100 each 0    levoFLOXacin (LEVAQUIN) 500 MG tablet Take 1 tablet (500 mg total) by mouth once daily. 30 tablet 1    levoFLOXacin (LEVAQUIN) 750 MG tablet Take 750 mg by mouth once daily.      metFORMIN (GLUCOPHAGE) 500 MG tablet TAKE 1 TABLET(500 MG) BY MOUTH TWICE DAILY WITH MEALS 60 tablet 0    mupirocin (BACTROBAN) 2 % ointment Apply topically 3 (three) times daily. 15 g 0    ondansetron (ZOFRAN) 8 MG tablet Take 1 tablet (8 mg total) by mouth every 8 (eight) hours as needed for Nausea. 6 tablet 0    promethazine (PHENERGAN) 25 MG tablet        No current facility-administered medications for this visit.        Review of Systems      Objective:        Physical Exam:   Foot Exam  Physical Exam    Imaging:            Assessment:       1. Ulcer of right foot, with necrosis of bone    2. Type 2 diabetes mellitus with foot ulcer, without long-term current use of insulin    3. Diabetic foot ulcer with osteomyelitis      Plan:   Ulcer of right foot, with necrosis of bone    Type 2 diabetes mellitus with foot ulcer, without long-term current use of insulin    Diabetic foot ulcer with osteomyelitis      No follow-ups on file.    Procedures  No notes on file       Counseling:     I provided patient education verbally regarding:   Patient diagnosis, treatment options, as well as alternatives, risks, and benefits.     This note was created using Dragon voice recognition software that occasionally misinterpreted phrases or words.

## 2021-01-06 RX ORDER — LEVOFLOXACIN 750 MG/1
750 TABLET ORAL DAILY
Qty: 30 TABLET | Refills: 0 | Status: SHIPPED | OUTPATIENT
Start: 2021-01-06 | End: 2021-02-05

## 2021-01-07 PROCEDURE — G0179 PR HOME HEALTH MD RECERTIFICATION: ICD-10-PCS | Mod: ,,, | Performed by: HOSPITALIST

## 2021-01-07 PROCEDURE — G0179 MD RECERTIFICATION HHA PT: HCPCS | Mod: ,,, | Performed by: HOSPITALIST

## 2021-01-11 ENCOUNTER — DOCUMENT SCAN (OUTPATIENT)
Dept: HOME HEALTH SERVICES | Facility: HOSPITAL | Age: 51
End: 2021-01-11
Payer: COMMERCIAL

## 2021-01-20 ENCOUNTER — EXTERNAL HOME HEALTH (OUTPATIENT)
Dept: HOME HEALTH SERVICES | Facility: HOSPITAL | Age: 51
End: 2021-01-20
Payer: COMMERCIAL

## 2021-01-22 ENCOUNTER — OFFICE VISIT (OUTPATIENT)
Dept: WOUND CARE | Facility: HOSPITAL | Age: 51
End: 2021-01-22
Attending: PODIATRIST
Payer: COMMERCIAL

## 2021-01-22 VITALS
SYSTOLIC BLOOD PRESSURE: 134 MMHG | TEMPERATURE: 98 F | DIASTOLIC BLOOD PRESSURE: 72 MMHG | RESPIRATION RATE: 18 BRPM | HEART RATE: 89 BPM

## 2021-01-22 DIAGNOSIS — L97.514 ULCER OF RIGHT FOOT, WITH NECROSIS OF BONE: Primary | ICD-10-CM

## 2021-01-22 DIAGNOSIS — L97.509 TYPE 2 DIABETES MELLITUS WITH FOOT ULCER, WITHOUT LONG-TERM CURRENT USE OF INSULIN: ICD-10-CM

## 2021-01-22 DIAGNOSIS — E11.621 TYPE 2 DIABETES MELLITUS WITH FOOT ULCER, WITHOUT LONG-TERM CURRENT USE OF INSULIN: ICD-10-CM

## 2021-01-22 PROCEDURE — 3046F PR MOST RECENT HEMOGLOBIN A1C LEVEL > 9.0%: ICD-10-PCS | Mod: ,,, | Performed by: PODIATRIST

## 2021-01-22 PROCEDURE — 99213 PR OFFICE/OUTPT VISIT, EST, LEVL III, 20-29 MIN: ICD-10-PCS | Mod: 25,,, | Performed by: PODIATRIST

## 2021-01-22 PROCEDURE — 99213 OFFICE O/P EST LOW 20 MIN: CPT | Mod: 25,,, | Performed by: PODIATRIST

## 2021-01-22 PROCEDURE — 3046F HEMOGLOBIN A1C LEVEL >9.0%: CPT | Mod: ,,, | Performed by: PODIATRIST

## 2021-01-22 PROCEDURE — 11042 DBRDMT SUBQ TIS 1ST 20SQCM/<: CPT | Performed by: PODIATRIST

## 2021-01-22 PROCEDURE — 11042 DBRDMT SUBQ TIS 1ST 20SQCM/<: CPT | Mod: ,,, | Performed by: PODIATRIST

## 2021-01-22 PROCEDURE — 11042 PR DEBRIDEMENT, SKIN, SUB-Q TISSUE,=<20 SQ CM: ICD-10-PCS | Mod: ,,, | Performed by: PODIATRIST

## 2021-01-28 ENCOUNTER — DOCUMENT SCAN (OUTPATIENT)
Dept: HOME HEALTH SERVICES | Facility: HOSPITAL | Age: 51
End: 2021-01-28
Payer: COMMERCIAL

## 2021-01-29 ENCOUNTER — OFFICE VISIT (OUTPATIENT)
Dept: WOUND CARE | Facility: HOSPITAL | Age: 51
End: 2021-01-29
Attending: PODIATRIST
Payer: COMMERCIAL

## 2021-01-29 VITALS
TEMPERATURE: 98 F | HEART RATE: 79 BPM | RESPIRATION RATE: 18 BRPM | SYSTOLIC BLOOD PRESSURE: 185 MMHG | DIASTOLIC BLOOD PRESSURE: 95 MMHG

## 2021-01-29 DIAGNOSIS — L97.514 ULCER OF RIGHT FOOT, WITH NECROSIS OF BONE: ICD-10-CM

## 2021-01-29 DIAGNOSIS — E11.621 TYPE 2 DIABETES MELLITUS WITH FOOT ULCER, WITHOUT LONG-TERM CURRENT USE OF INSULIN: Primary | ICD-10-CM

## 2021-01-29 DIAGNOSIS — L97.509 TYPE 2 DIABETES MELLITUS WITH FOOT ULCER, WITHOUT LONG-TERM CURRENT USE OF INSULIN: Primary | ICD-10-CM

## 2021-01-29 PROCEDURE — 11042 PR DEBRIDEMENT, SKIN, SUB-Q TISSUE,=<20 SQ CM: ICD-10-PCS | Mod: ,,, | Performed by: PODIATRIST

## 2021-01-29 PROCEDURE — 1126F PR PAIN SEVERITY QUANTIFIED, NO PAIN PRESENT: ICD-10-PCS | Mod: ,,, | Performed by: PODIATRIST

## 2021-01-29 PROCEDURE — 11042 DBRDMT SUBQ TIS 1ST 20SQCM/<: CPT | Performed by: PODIATRIST

## 2021-01-29 PROCEDURE — 11042 DBRDMT SUBQ TIS 1ST 20SQCM/<: CPT | Mod: ,,, | Performed by: PODIATRIST

## 2021-01-29 PROCEDURE — 1126F AMNT PAIN NOTED NONE PRSNT: CPT | Mod: ,,, | Performed by: PODIATRIST

## 2021-02-03 RX ORDER — LEVOFLOXACIN 750 MG/1
750 TABLET ORAL DAILY
Qty: 30 TABLET | Refills: 0 | Status: SHIPPED | OUTPATIENT
Start: 2021-02-03 | End: 2021-02-26 | Stop reason: SDUPTHER

## 2021-02-05 ENCOUNTER — OFFICE VISIT (OUTPATIENT)
Dept: WOUND CARE | Facility: HOSPITAL | Age: 51
End: 2021-02-05
Attending: PODIATRIST
Payer: COMMERCIAL

## 2021-02-05 VITALS — TEMPERATURE: 99 F | SYSTOLIC BLOOD PRESSURE: 144 MMHG | DIASTOLIC BLOOD PRESSURE: 88 MMHG | HEART RATE: 80 BPM

## 2021-02-05 DIAGNOSIS — L97.514 ULCER OF RIGHT FOOT, WITH NECROSIS OF BONE: Primary | ICD-10-CM

## 2021-02-05 PROCEDURE — 1126F AMNT PAIN NOTED NONE PRSNT: CPT | Mod: ,,, | Performed by: PODIATRIST

## 2021-02-05 PROCEDURE — 97605 NEG PRS WND THER DME<=50SQCM: CPT | Performed by: PODIATRIST

## 2021-02-05 PROCEDURE — 11042 PR DEBRIDEMENT, SKIN, SUB-Q TISSUE,=<20 SQ CM: ICD-10-PCS | Mod: ,,, | Performed by: PODIATRIST

## 2021-02-05 PROCEDURE — 11042 DBRDMT SUBQ TIS 1ST 20SQCM/<: CPT | Mod: ,,, | Performed by: PODIATRIST

## 2021-02-05 PROCEDURE — 11042 DBRDMT SUBQ TIS 1ST 20SQCM/<: CPT | Performed by: PODIATRIST

## 2021-02-05 PROCEDURE — 1126F PR PAIN SEVERITY QUANTIFIED, NO PAIN PRESENT: ICD-10-PCS | Mod: ,,, | Performed by: PODIATRIST

## 2021-02-09 ENCOUNTER — TELEPHONE (OUTPATIENT)
Dept: PODIATRY | Facility: CLINIC | Age: 51
End: 2021-02-09

## 2021-02-10 ENCOUNTER — TELEPHONE (OUTPATIENT)
Dept: FAMILY MEDICINE | Facility: CLINIC | Age: 51
End: 2021-02-10

## 2021-02-11 ENCOUNTER — OFFICE VISIT (OUTPATIENT)
Dept: FAMILY MEDICINE | Facility: CLINIC | Age: 51
End: 2021-02-11
Payer: COMMERCIAL

## 2021-02-11 VITALS
HEIGHT: 71 IN | BODY MASS INDEX: 30.06 KG/M2 | RESPIRATION RATE: 15 BRPM | DIASTOLIC BLOOD PRESSURE: 74 MMHG | OXYGEN SATURATION: 99 % | WEIGHT: 214.75 LBS | SYSTOLIC BLOOD PRESSURE: 128 MMHG | HEART RATE: 71 BPM

## 2021-02-11 DIAGNOSIS — Z11.59 NEED FOR HEPATITIS C SCREENING TEST: ICD-10-CM

## 2021-02-11 DIAGNOSIS — E11.621 TYPE 2 DIABETES MELLITUS WITH FOOT ULCER, WITHOUT LONG-TERM CURRENT USE OF INSULIN: Primary | ICD-10-CM

## 2021-02-11 DIAGNOSIS — Z11.4 SCREENING FOR HIV WITHOUT PRESENCE OF RISK FACTORS: ICD-10-CM

## 2021-02-11 DIAGNOSIS — Z13.29 SCREENING FOR THYROID DISORDER: ICD-10-CM

## 2021-02-11 DIAGNOSIS — Z12.5 PROSTATE CANCER SCREENING: ICD-10-CM

## 2021-02-11 DIAGNOSIS — L97.509 TYPE 2 DIABETES MELLITUS WITH FOOT ULCER, WITHOUT LONG-TERM CURRENT USE OF INSULIN: Primary | ICD-10-CM

## 2021-02-11 DIAGNOSIS — N52.9 ERECTILE DYSFUNCTION, UNSPECIFIED ERECTILE DYSFUNCTION TYPE: ICD-10-CM

## 2021-02-11 PROCEDURE — 1126F PR PAIN SEVERITY QUANTIFIED, NO PAIN PRESENT: ICD-10-PCS | Mod: S$GLB,,, | Performed by: FAMILY MEDICINE

## 2021-02-11 PROCEDURE — 3008F BODY MASS INDEX DOCD: CPT | Mod: S$GLB,,, | Performed by: FAMILY MEDICINE

## 2021-02-11 PROCEDURE — 99999 PR PBB SHADOW E&M-EST. PATIENT-LVL IV: CPT | Mod: PBBFAC,,, | Performed by: FAMILY MEDICINE

## 2021-02-11 PROCEDURE — 99215 PR OFFICE/OUTPT VISIT, EST, LEVL V, 40-54 MIN: ICD-10-PCS | Mod: S$GLB,,, | Performed by: FAMILY MEDICINE

## 2021-02-11 PROCEDURE — 99999 PR PBB SHADOW E&M-EST. PATIENT-LVL IV: ICD-10-PCS | Mod: PBBFAC,,, | Performed by: FAMILY MEDICINE

## 2021-02-11 PROCEDURE — 3008F PR BODY MASS INDEX (BMI) DOCUMENTED: ICD-10-PCS | Mod: S$GLB,,, | Performed by: FAMILY MEDICINE

## 2021-02-11 PROCEDURE — 99215 OFFICE O/P EST HI 40 MIN: CPT | Mod: S$GLB,,, | Performed by: FAMILY MEDICINE

## 2021-02-11 PROCEDURE — 1126F AMNT PAIN NOTED NONE PRSNT: CPT | Mod: S$GLB,,, | Performed by: FAMILY MEDICINE

## 2021-02-11 PROCEDURE — 3051F PR MOST RECENT HEMOGLOBIN A1C LEVEL 7.0 - < 8.0%: ICD-10-PCS | Mod: S$GLB,,, | Performed by: FAMILY MEDICINE

## 2021-02-11 PROCEDURE — 3051F HG A1C>EQUAL 7.0%<8.0%: CPT | Mod: S$GLB,,, | Performed by: FAMILY MEDICINE

## 2021-02-11 RX ORDER — INSULIN GLARGINE 100 [IU]/ML
5 INJECTION, SOLUTION SUBCUTANEOUS DAILY
Qty: 1 BOX | Refills: 2 | Status: SHIPPED | OUTPATIENT
Start: 2021-02-11 | End: 2022-12-12 | Stop reason: SDUPTHER

## 2021-02-11 RX ORDER — LANCETS
1 EACH MISCELLANEOUS
Qty: 200 EACH | Status: SHIPPED | OUTPATIENT
Start: 2021-02-11

## 2021-02-11 RX ORDER — GABAPENTIN 100 MG/1
100 CAPSULE ORAL 3 TIMES DAILY
Qty: 90 CAPSULE | Refills: 5 | Status: SHIPPED | OUTPATIENT
Start: 2021-02-11 | End: 2021-08-10

## 2021-02-11 RX ORDER — METFORMIN HYDROCHLORIDE 500 MG/1
500 TABLET ORAL 2 TIMES DAILY WITH MEALS
Qty: 60 TABLET | Refills: 5 | Status: SHIPPED | OUTPATIENT
Start: 2021-02-11 | End: 2021-04-22 | Stop reason: SDUPTHER

## 2021-02-11 RX ORDER — INSULIN LISPRO 200 [IU]/ML
20 INJECTION, SOLUTION SUBCUTANEOUS 4 TIMES DAILY PRN
Qty: 5 SYRINGE | Refills: 2 | Status: SHIPPED | OUTPATIENT
Start: 2021-02-11 | End: 2021-06-16

## 2021-02-11 RX ORDER — PEN NEEDLE, DIABETIC 30 GX3/16"
1 NEEDLE, DISPOSABLE MISCELLANEOUS
Qty: 200 EACH | Status: SHIPPED | OUTPATIENT
Start: 2021-02-11 | End: 2022-05-15

## 2021-02-11 RX ORDER — BLOOD-GLUCOSE METER
1 KIT MISCELLANEOUS 2 TIMES DAILY PRN
Qty: 200 STRIP | Status: SHIPPED | OUTPATIENT
Start: 2021-02-11 | End: 2023-06-29 | Stop reason: SDUPTHER

## 2021-02-12 ENCOUNTER — OFFICE VISIT (OUTPATIENT)
Dept: WOUND CARE | Facility: HOSPITAL | Age: 51
End: 2021-02-12
Attending: PODIATRIST
Payer: COMMERCIAL

## 2021-02-12 VITALS
TEMPERATURE: 98 F | SYSTOLIC BLOOD PRESSURE: 150 MMHG | DIASTOLIC BLOOD PRESSURE: 95 MMHG | RESPIRATION RATE: 18 BRPM | HEART RATE: 75 BPM

## 2021-02-12 DIAGNOSIS — M86.179 OTHER ACUTE OSTEOMYELITIS, UNSPECIFIED ANKLE AND FOOT: ICD-10-CM

## 2021-02-12 DIAGNOSIS — E11.621 TYPE 2 DIABETES MELLITUS WITH FOOT ULCER, WITHOUT LONG-TERM CURRENT USE OF INSULIN: ICD-10-CM

## 2021-02-12 DIAGNOSIS — M86.9 DIABETIC FOOT ULCER WITH OSTEOMYELITIS: ICD-10-CM

## 2021-02-12 DIAGNOSIS — E11.621 DIABETIC FOOT ULCER WITH OSTEOMYELITIS: ICD-10-CM

## 2021-02-12 DIAGNOSIS — L97.509 TYPE 2 DIABETES MELLITUS WITH FOOT ULCER, WITHOUT LONG-TERM CURRENT USE OF INSULIN: ICD-10-CM

## 2021-02-12 DIAGNOSIS — L97.509 DIABETIC FOOT ULCER WITH OSTEOMYELITIS: ICD-10-CM

## 2021-02-12 DIAGNOSIS — E11.69 DIABETIC FOOT ULCER WITH OSTEOMYELITIS: ICD-10-CM

## 2021-02-12 DIAGNOSIS — L97.514 ULCER OF RIGHT FOOT, WITH NECROSIS OF BONE: Primary | ICD-10-CM

## 2021-02-12 PROCEDURE — 1125F PR PAIN SEVERITY QUANTIFIED, PAIN PRESENT: ICD-10-PCS | Mod: ,,, | Performed by: PODIATRIST

## 2021-02-12 PROCEDURE — 97605 NEG PRS WND THER DME<=50SQCM: CPT | Performed by: PODIATRIST

## 2021-02-12 PROCEDURE — 1125F AMNT PAIN NOTED PAIN PRSNT: CPT | Mod: ,,, | Performed by: PODIATRIST

## 2021-02-12 PROCEDURE — 11042 DBRDMT SUBQ TIS 1ST 20SQCM/<: CPT | Performed by: PODIATRIST

## 2021-02-12 PROCEDURE — 11042 PR DEBRIDEMENT, SKIN, SUB-Q TISSUE,=<20 SQ CM: ICD-10-PCS | Mod: ,,, | Performed by: PODIATRIST

## 2021-02-12 PROCEDURE — 11042 DBRDMT SUBQ TIS 1ST 20SQCM/<: CPT | Mod: ,,, | Performed by: PODIATRIST

## 2021-02-19 ENCOUNTER — OFFICE VISIT (OUTPATIENT)
Dept: WOUND CARE | Facility: HOSPITAL | Age: 51
End: 2021-02-19
Attending: PODIATRIST
Payer: COMMERCIAL

## 2021-02-19 ENCOUNTER — TELEPHONE (OUTPATIENT)
Dept: FAMILY MEDICINE | Facility: CLINIC | Age: 51
End: 2021-02-19

## 2021-02-19 DIAGNOSIS — E11.621 DIABETIC FOOT ULCER WITH OSTEOMYELITIS: ICD-10-CM

## 2021-02-19 DIAGNOSIS — M86.9 DIABETIC FOOT ULCER WITH OSTEOMYELITIS: ICD-10-CM

## 2021-02-19 DIAGNOSIS — M86.179 OTHER ACUTE OSTEOMYELITIS, UNSPECIFIED ANKLE AND FOOT: ICD-10-CM

## 2021-02-19 DIAGNOSIS — L97.509 DIABETIC FOOT ULCER WITH OSTEOMYELITIS: ICD-10-CM

## 2021-02-19 DIAGNOSIS — E11.621 TYPE 2 DIABETES MELLITUS WITH FOOT ULCER, WITHOUT LONG-TERM CURRENT USE OF INSULIN: ICD-10-CM

## 2021-02-19 DIAGNOSIS — E11.69 DIABETIC FOOT ULCER WITH OSTEOMYELITIS: ICD-10-CM

## 2021-02-19 DIAGNOSIS — L97.509 TYPE 2 DIABETES MELLITUS WITH FOOT ULCER, WITHOUT LONG-TERM CURRENT USE OF INSULIN: ICD-10-CM

## 2021-02-19 DIAGNOSIS — L97.514 ULCER OF RIGHT FOOT, WITH NECROSIS OF BONE: Primary | ICD-10-CM

## 2021-02-19 PROCEDURE — 11042 DBRDMT SUBQ TIS 1ST 20SQCM/<: CPT | Performed by: PODIATRIST

## 2021-02-19 PROCEDURE — 11042 DBRDMT SUBQ TIS 1ST 20SQCM/<: CPT | Mod: ,,, | Performed by: PODIATRIST

## 2021-02-19 PROCEDURE — 11042 PR DEBRIDEMENT, SKIN, SUB-Q TISSUE,=<20 SQ CM: ICD-10-PCS | Mod: ,,, | Performed by: PODIATRIST

## 2021-02-25 ENCOUNTER — LAB VISIT (OUTPATIENT)
Dept: FAMILY MEDICINE | Facility: CLINIC | Age: 51
End: 2021-02-25
Payer: COMMERCIAL

## 2021-02-25 ENCOUNTER — APPOINTMENT (OUTPATIENT)
Dept: LAB | Facility: HOSPITAL | Age: 51
End: 2021-02-25
Attending: FAMILY MEDICINE
Payer: COMMERCIAL

## 2021-02-25 ENCOUNTER — DOCUMENT SCAN (OUTPATIENT)
Dept: HOME HEALTH SERVICES | Facility: HOSPITAL | Age: 51
End: 2021-02-25
Payer: COMMERCIAL

## 2021-02-25 ENCOUNTER — TELEPHONE (OUTPATIENT)
Dept: PODIATRY | Facility: CLINIC | Age: 51
End: 2021-02-25

## 2021-02-25 DIAGNOSIS — E11.621 TYPE 2 DIABETES MELLITUS WITH FOOT ULCER, WITHOUT LONG-TERM CURRENT USE OF INSULIN: ICD-10-CM

## 2021-02-25 DIAGNOSIS — N52.9 ERECTILE DYSFUNCTION, UNSPECIFIED ERECTILE DYSFUNCTION TYPE: ICD-10-CM

## 2021-02-25 DIAGNOSIS — Z13.29 SCREENING FOR THYROID DISORDER: ICD-10-CM

## 2021-02-25 DIAGNOSIS — Z11.4 SCREENING FOR HIV WITHOUT PRESENCE OF RISK FACTORS: ICD-10-CM

## 2021-02-25 DIAGNOSIS — Z12.5 PROSTATE CANCER SCREENING: ICD-10-CM

## 2021-02-25 DIAGNOSIS — L97.509 TYPE 2 DIABETES MELLITUS WITH FOOT ULCER, WITHOUT LONG-TERM CURRENT USE OF INSULIN: ICD-10-CM

## 2021-02-25 DIAGNOSIS — Z11.59 NEED FOR HEPATITIS C SCREENING TEST: ICD-10-CM

## 2021-02-25 LAB
ALBUMIN SERPL BCP-MCNC: 3.7 G/DL (ref 3.5–5.2)
ALP SERPL-CCNC: 54 U/L (ref 55–135)
ALT SERPL W/O P-5'-P-CCNC: 11 U/L (ref 10–44)
ANION GAP SERPL CALC-SCNC: 8 MMOL/L (ref 8–16)
AST SERPL-CCNC: 17 U/L (ref 10–40)
BASOPHILS # BLD AUTO: 0.03 K/UL (ref 0–0.2)
BASOPHILS NFR BLD: 0.5 % (ref 0–1.9)
BILIRUB SERPL-MCNC: 0.6 MG/DL (ref 0.1–1)
BUN SERPL-MCNC: 13 MG/DL (ref 6–20)
CALCIUM SERPL-MCNC: 8.6 MG/DL (ref 8.7–10.5)
CHLORIDE SERPL-SCNC: 106 MMOL/L (ref 95–110)
CHOLEST SERPL-MCNC: 112 MG/DL (ref 120–199)
CHOLEST/HDLC SERPL: 2.8 {RATIO} (ref 2–5)
CO2 SERPL-SCNC: 28 MMOL/L (ref 23–29)
COMPLEXED PSA SERPL-MCNC: 0.49 NG/ML (ref 0–4)
CREAT SERPL-MCNC: 1 MG/DL (ref 0.5–1.4)
DIFFERENTIAL METHOD: ABNORMAL
EOSINOPHIL # BLD AUTO: 0.2 K/UL (ref 0–0.5)
EOSINOPHIL NFR BLD: 2.7 % (ref 0–8)
ERYTHROCYTE [DISTWIDTH] IN BLOOD BY AUTOMATED COUNT: 12 % (ref 11.5–14.5)
EST. GFR  (AFRICAN AMERICAN): >60 ML/MIN/1.73 M^2
EST. GFR  (NON AFRICAN AMERICAN): >60 ML/MIN/1.73 M^2
ESTIMATED AVG GLUCOSE: 157 MG/DL (ref 68–131)
GLUCOSE SERPL-MCNC: 113 MG/DL (ref 70–110)
HBA1C MFR BLD: 7.1 % (ref 4.5–6.2)
HCT VFR BLD AUTO: 37.4 % (ref 40–54)
HDLC SERPL-MCNC: 40 MG/DL (ref 40–75)
HDLC SERPL: 35.7 % (ref 20–50)
HGB BLD-MCNC: 11.5 G/DL (ref 14–18)
IMM GRANULOCYTES # BLD AUTO: 0.01 K/UL (ref 0–0.04)
IMM GRANULOCYTES NFR BLD AUTO: 0.2 % (ref 0–0.5)
LDLC SERPL CALC-MCNC: 67.4 MG/DL (ref 63–159)
LYMPHOCYTES # BLD AUTO: 2.5 K/UL (ref 1–4.8)
LYMPHOCYTES NFR BLD: 45.2 % (ref 18–48)
MCH RBC QN AUTO: 26.4 PG (ref 27–31)
MCHC RBC AUTO-ENTMCNC: 30.7 G/DL (ref 32–36)
MCV RBC AUTO: 86 FL (ref 82–98)
MONOCYTES # BLD AUTO: 0.4 K/UL (ref 0.3–1)
MONOCYTES NFR BLD: 7 % (ref 4–15)
NEUTROPHILS # BLD AUTO: 2.5 K/UL (ref 1.8–7.7)
NEUTROPHILS NFR BLD: 44.4 % (ref 38–73)
NONHDLC SERPL-MCNC: 72 MG/DL
NRBC BLD-RTO: 0 /100 WBC
PLATELET # BLD AUTO: 352 K/UL (ref 150–350)
PMV BLD AUTO: 9.1 FL (ref 9.2–12.9)
POTASSIUM SERPL-SCNC: 3.9 MMOL/L (ref 3.5–5.1)
PROT SERPL-MCNC: 6.6 G/DL (ref 6–8.4)
RBC # BLD AUTO: 4.36 M/UL (ref 4.6–6.2)
SODIUM SERPL-SCNC: 142 MMOL/L (ref 136–145)
T4 FREE SERPL-MCNC: 1 NG/DL (ref 0.71–1.51)
TRIGL SERPL-MCNC: 23 MG/DL (ref 30–150)
TSH SERPL DL<=0.005 MIU/L-ACNC: 1.99 UIU/ML (ref 0.34–5.6)
WBC # BLD AUTO: 5.57 K/UL (ref 3.9–12.7)

## 2021-02-25 PROCEDURE — 85025 COMPLETE CBC W/AUTO DIFF WBC: CPT

## 2021-02-25 PROCEDURE — 83036 HEMOGLOBIN GLYCOSYLATED A1C: CPT

## 2021-02-25 PROCEDURE — 84439 ASSAY OF FREE THYROXINE: CPT

## 2021-02-25 PROCEDURE — 84146 ASSAY OF PROLACTIN: CPT

## 2021-02-25 PROCEDURE — 80053 COMPREHEN METABOLIC PANEL: CPT

## 2021-02-25 PROCEDURE — 80061 LIPID PANEL: CPT

## 2021-02-25 PROCEDURE — 86703 HIV-1/HIV-2 1 RESULT ANTBDY: CPT

## 2021-02-25 PROCEDURE — 84403 ASSAY OF TOTAL TESTOSTERONE: CPT

## 2021-02-25 PROCEDURE — 84153 ASSAY OF PSA TOTAL: CPT

## 2021-02-25 PROCEDURE — 82306 VITAMIN D 25 HYDROXY: CPT

## 2021-02-25 PROCEDURE — 84270 ASSAY OF SEX HORMONE GLOBUL: CPT

## 2021-02-25 PROCEDURE — 86803 HEPATITIS C AB TEST: CPT

## 2021-02-25 PROCEDURE — 82607 VITAMIN B-12: CPT

## 2021-02-25 PROCEDURE — 84443 ASSAY THYROID STIM HORMONE: CPT

## 2021-02-26 ENCOUNTER — OFFICE VISIT (OUTPATIENT)
Dept: WOUND CARE | Facility: HOSPITAL | Age: 51
End: 2021-02-26
Attending: PODIATRIST
Payer: COMMERCIAL

## 2021-02-26 ENCOUNTER — TELEPHONE (OUTPATIENT)
Dept: PODIATRY | Facility: CLINIC | Age: 51
End: 2021-02-26

## 2021-02-26 ENCOUNTER — DOCUMENT SCAN (OUTPATIENT)
Dept: HOME HEALTH SERVICES | Facility: HOSPITAL | Age: 51
End: 2021-02-26
Payer: COMMERCIAL

## 2021-02-26 VITALS
HEART RATE: 80 BPM | TEMPERATURE: 98 F | RESPIRATION RATE: 18 BRPM | SYSTOLIC BLOOD PRESSURE: 167 MMHG | DIASTOLIC BLOOD PRESSURE: 92 MMHG

## 2021-02-26 DIAGNOSIS — M86.179 OTHER ACUTE OSTEOMYELITIS, UNSPECIFIED ANKLE AND FOOT: ICD-10-CM

## 2021-02-26 DIAGNOSIS — Z79.4 TYPE 2 DIABETES MELLITUS WITH DIABETIC POLYNEUROPATHY, WITH LONG-TERM CURRENT USE OF INSULIN: Primary | ICD-10-CM

## 2021-02-26 DIAGNOSIS — L97.514 ULCER OF RIGHT FOOT, WITH NECROSIS OF BONE: Primary | ICD-10-CM

## 2021-02-26 DIAGNOSIS — E11.621 DIABETIC FOOT ULCER WITH OSTEOMYELITIS: ICD-10-CM

## 2021-02-26 DIAGNOSIS — E11.69 DIABETIC FOOT ULCER WITH OSTEOMYELITIS: ICD-10-CM

## 2021-02-26 DIAGNOSIS — E11.42 TYPE 2 DIABETES MELLITUS WITH DIABETIC POLYNEUROPATHY, WITH LONG-TERM CURRENT USE OF INSULIN: Primary | ICD-10-CM

## 2021-02-26 DIAGNOSIS — M20.5X1: ICD-10-CM

## 2021-02-26 DIAGNOSIS — M20.42 HAMMER TOES OF BOTH FEET: ICD-10-CM

## 2021-02-26 DIAGNOSIS — L97.509 DIABETIC FOOT ULCER WITH OSTEOMYELITIS: ICD-10-CM

## 2021-02-26 DIAGNOSIS — M86.9 DIABETIC FOOT ULCER WITH OSTEOMYELITIS: ICD-10-CM

## 2021-02-26 DIAGNOSIS — M20.41 HAMMER TOES OF BOTH FEET: ICD-10-CM

## 2021-02-26 DIAGNOSIS — Z86.31 HISTORY OF DIABETIC ULCER OF FOOT: ICD-10-CM

## 2021-02-26 LAB
25(OH)D3+25(OH)D2 SERPL-MCNC: 22 NG/ML (ref 30–96)
HCV AB SERPL QL IA: NEGATIVE
HIV 1+2 AB+HIV1 P24 AG SERPL QL IA: NEGATIVE
PROLACTIN SERPL IA-MCNC: 14.6 NG/ML (ref 3.5–19.4)
TESTOST SERPL-MCNC: 346 NG/DL (ref 304–1227)
VIT B12 SERPL-MCNC: 1121 PG/ML (ref 210–950)

## 2021-02-26 PROCEDURE — 1126F PR PAIN SEVERITY QUANTIFIED, NO PAIN PRESENT: ICD-10-PCS | Mod: ,,, | Performed by: PODIATRIST

## 2021-02-26 PROCEDURE — 11042 DBRDMT SUBQ TIS 1ST 20SQCM/<: CPT | Performed by: PODIATRIST

## 2021-02-26 PROCEDURE — 3051F HG A1C>EQUAL 7.0%<8.0%: CPT | Mod: ,,, | Performed by: PODIATRIST

## 2021-02-26 PROCEDURE — 3051F PR MOST RECENT HEMOGLOBIN A1C LEVEL 7.0 - < 8.0%: ICD-10-PCS | Mod: ,,, | Performed by: PODIATRIST

## 2021-02-26 PROCEDURE — 99213 OFFICE O/P EST LOW 20 MIN: CPT | Mod: 25,,, | Performed by: PODIATRIST

## 2021-02-26 PROCEDURE — 11042 DBRDMT SUBQ TIS 1ST 20SQCM/<: CPT | Mod: ,,, | Performed by: PODIATRIST

## 2021-02-26 PROCEDURE — 99213 PR OFFICE/OUTPT VISIT, EST, LEVL III, 20-29 MIN: ICD-10-PCS | Mod: 25,,, | Performed by: PODIATRIST

## 2021-02-26 PROCEDURE — 11042 PR DEBRIDEMENT, SKIN, SUB-Q TISSUE,=<20 SQ CM: ICD-10-PCS | Mod: ,,, | Performed by: PODIATRIST

## 2021-02-26 PROCEDURE — 1126F AMNT PAIN NOTED NONE PRSNT: CPT | Mod: ,,, | Performed by: PODIATRIST

## 2021-02-26 RX ORDER — LEVOFLOXACIN 750 MG/1
750 TABLET ORAL DAILY
Qty: 30 TABLET | Refills: 0 | Status: SHIPPED | OUTPATIENT
Start: 2021-02-26 | End: 2021-03-28

## 2021-03-01 LAB — SHBG SERPL-SCNC: 20 NMOL/L (ref 11–56)

## 2021-03-05 ENCOUNTER — DOCUMENT SCAN (OUTPATIENT)
Dept: HOME HEALTH SERVICES | Facility: HOSPITAL | Age: 51
End: 2021-03-05
Payer: COMMERCIAL

## 2021-03-08 ENCOUNTER — EXTERNAL HOME HEALTH (OUTPATIENT)
Dept: HOME HEALTH SERVICES | Facility: HOSPITAL | Age: 51
End: 2021-03-08
Payer: COMMERCIAL

## 2021-03-08 PROCEDURE — G0179 PR HOME HEALTH MD RECERTIFICATION: ICD-10-PCS | Mod: ,,, | Performed by: HOSPITALIST

## 2021-03-08 PROCEDURE — G0179 MD RECERTIFICATION HHA PT: HCPCS | Mod: ,,, | Performed by: HOSPITALIST

## 2021-03-11 ENCOUNTER — DOCUMENTATION ONLY (OUTPATIENT)
Dept: FAMILY MEDICINE | Facility: CLINIC | Age: 51
End: 2021-03-11

## 2021-03-11 ENCOUNTER — OFFICE VISIT (OUTPATIENT)
Dept: FAMILY MEDICINE | Facility: CLINIC | Age: 51
End: 2021-03-11
Payer: COMMERCIAL

## 2021-03-11 VITALS
HEART RATE: 82 BPM | OXYGEN SATURATION: 97 % | DIASTOLIC BLOOD PRESSURE: 76 MMHG | BODY MASS INDEX: 30.19 KG/M2 | SYSTOLIC BLOOD PRESSURE: 138 MMHG | WEIGHT: 215.63 LBS | TEMPERATURE: 97 F | HEIGHT: 71 IN

## 2021-03-11 DIAGNOSIS — E11.42 DIABETIC POLYNEUROPATHY ASSOCIATED WITH TYPE 2 DIABETES MELLITUS: ICD-10-CM

## 2021-03-11 DIAGNOSIS — E55.9 VITAMIN D DEFICIENCY: ICD-10-CM

## 2021-03-11 DIAGNOSIS — L97.519 FOOT ULCERATION, RIGHT, WITH UNSPECIFIED SEVERITY: ICD-10-CM

## 2021-03-11 DIAGNOSIS — L97.509 TYPE 2 DIABETES MELLITUS WITH FOOT ULCER, WITHOUT LONG-TERM CURRENT USE OF INSULIN: Primary | ICD-10-CM

## 2021-03-11 DIAGNOSIS — E11.621 TYPE 2 DIABETES MELLITUS WITH FOOT ULCER, WITHOUT LONG-TERM CURRENT USE OF INSULIN: Primary | ICD-10-CM

## 2021-03-11 PROCEDURE — 3051F HG A1C>EQUAL 7.0%<8.0%: CPT | Mod: S$GLB,,, | Performed by: FAMILY MEDICINE

## 2021-03-11 PROCEDURE — 99214 PR OFFICE/OUTPT VISIT, EST, LEVL IV, 30-39 MIN: ICD-10-PCS | Mod: S$GLB,,, | Performed by: FAMILY MEDICINE

## 2021-03-11 PROCEDURE — 1126F AMNT PAIN NOTED NONE PRSNT: CPT | Mod: S$GLB,,, | Performed by: FAMILY MEDICINE

## 2021-03-11 PROCEDURE — 99214 OFFICE O/P EST MOD 30 MIN: CPT | Mod: S$GLB,,, | Performed by: FAMILY MEDICINE

## 2021-03-11 PROCEDURE — 3008F BODY MASS INDEX DOCD: CPT | Mod: S$GLB,,, | Performed by: FAMILY MEDICINE

## 2021-03-11 PROCEDURE — 3008F PR BODY MASS INDEX (BMI) DOCUMENTED: ICD-10-PCS | Mod: S$GLB,,, | Performed by: FAMILY MEDICINE

## 2021-03-11 PROCEDURE — 99999 PR PBB SHADOW E&M-EST. PATIENT-LVL IV: ICD-10-PCS | Mod: PBBFAC,,, | Performed by: FAMILY MEDICINE

## 2021-03-11 PROCEDURE — 3051F PR MOST RECENT HEMOGLOBIN A1C LEVEL 7.0 - < 8.0%: ICD-10-PCS | Mod: S$GLB,,, | Performed by: FAMILY MEDICINE

## 2021-03-11 PROCEDURE — 99999 PR PBB SHADOW E&M-EST. PATIENT-LVL IV: CPT | Mod: PBBFAC,,, | Performed by: FAMILY MEDICINE

## 2021-03-11 PROCEDURE — 1126F PR PAIN SEVERITY QUANTIFIED, NO PAIN PRESENT: ICD-10-PCS | Mod: S$GLB,,, | Performed by: FAMILY MEDICINE

## 2021-03-11 RX ORDER — ASPIRIN 325 MG
50000 TABLET, DELAYED RELEASE (ENTERIC COATED) ORAL
Qty: 24 CAPSULE | Refills: 0 | Status: SHIPPED | OUTPATIENT
Start: 2021-03-11 | End: 2021-04-22 | Stop reason: SDUPTHER

## 2021-03-11 RX ORDER — LANCETS 28 GAUGE
EACH MISCELLANEOUS
COMMUNITY
Start: 2021-02-11 | End: 2023-07-18 | Stop reason: SDUPTHER

## 2021-03-15 LAB
LEFT EYE DM RETINOPATHY: POSITIVE
RIGHT EYE DM RETINOPATHY: POSITIVE

## 2021-03-16 ENCOUNTER — OFFICE VISIT (OUTPATIENT)
Dept: WOUND CARE | Facility: HOSPITAL | Age: 51
End: 2021-03-16
Attending: PODIATRIST
Payer: COMMERCIAL

## 2021-03-16 ENCOUNTER — PATIENT OUTREACH (OUTPATIENT)
Dept: ADMINISTRATIVE | Facility: HOSPITAL | Age: 51
End: 2021-03-16

## 2021-03-16 VITALS
RESPIRATION RATE: 16 BRPM | TEMPERATURE: 98 F | DIASTOLIC BLOOD PRESSURE: 83 MMHG | HEART RATE: 83 BPM | SYSTOLIC BLOOD PRESSURE: 137 MMHG

## 2021-03-16 DIAGNOSIS — E11.621 DIABETIC FOOT ULCER WITH OSTEOMYELITIS: ICD-10-CM

## 2021-03-16 DIAGNOSIS — L97.509 DIABETIC FOOT ULCER WITH OSTEOMYELITIS: ICD-10-CM

## 2021-03-16 DIAGNOSIS — L97.514 ULCER OF RIGHT FOOT, WITH NECROSIS OF BONE: Primary | ICD-10-CM

## 2021-03-16 DIAGNOSIS — E11.9 DIABETES MELLITUS WITHOUT COMPLICATION: Primary | ICD-10-CM

## 2021-03-16 DIAGNOSIS — E11.69 DIABETIC FOOT ULCER WITH OSTEOMYELITIS: ICD-10-CM

## 2021-03-16 DIAGNOSIS — M86.9 DIABETIC FOOT ULCER WITH OSTEOMYELITIS: ICD-10-CM

## 2021-03-16 PROCEDURE — 1126F AMNT PAIN NOTED NONE PRSNT: CPT | Mod: ,,, | Performed by: PODIATRIST

## 2021-03-16 PROCEDURE — 11042 DBRDMT SUBQ TIS 1ST 20SQCM/<: CPT | Performed by: PODIATRIST

## 2021-03-16 PROCEDURE — 11042 DBRDMT SUBQ TIS 1ST 20SQCM/<: CPT | Mod: ,,, | Performed by: PODIATRIST

## 2021-03-16 PROCEDURE — 1126F PR PAIN SEVERITY QUANTIFIED, NO PAIN PRESENT: ICD-10-PCS | Mod: ,,, | Performed by: PODIATRIST

## 2021-03-16 PROCEDURE — 11042 PR DEBRIDEMENT, SKIN, SUB-Q TISSUE,=<20 SQ CM: ICD-10-PCS | Mod: ,,, | Performed by: PODIATRIST

## 2021-03-30 ENCOUNTER — OFFICE VISIT (OUTPATIENT)
Dept: WOUND CARE | Facility: HOSPITAL | Age: 51
End: 2021-03-30
Attending: PODIATRIST
Payer: COMMERCIAL

## 2021-03-30 VITALS
DIASTOLIC BLOOD PRESSURE: 102 MMHG | RESPIRATION RATE: 17 BRPM | TEMPERATURE: 98 F | SYSTOLIC BLOOD PRESSURE: 170 MMHG | HEART RATE: 85 BPM

## 2021-03-30 DIAGNOSIS — L97.514 ULCER OF RIGHT FOOT, WITH NECROSIS OF BONE: Primary | ICD-10-CM

## 2021-03-30 PROCEDURE — 1126F PR PAIN SEVERITY QUANTIFIED, NO PAIN PRESENT: ICD-10-PCS | Mod: ,,, | Performed by: PODIATRIST

## 2021-03-30 PROCEDURE — 11042 DBRDMT SUBQ TIS 1ST 20SQCM/<: CPT | Performed by: PODIATRIST

## 2021-03-30 PROCEDURE — 1126F AMNT PAIN NOTED NONE PRSNT: CPT | Mod: ,,, | Performed by: PODIATRIST

## 2021-03-30 PROCEDURE — 11042 DBRDMT SUBQ TIS 1ST 20SQCM/<: CPT | Mod: ,,, | Performed by: PODIATRIST

## 2021-03-30 PROCEDURE — 11042 PR DEBRIDEMENT, SKIN, SUB-Q TISSUE,=<20 SQ CM: ICD-10-PCS | Mod: ,,, | Performed by: PODIATRIST

## 2021-03-30 RX ORDER — DOXYCYCLINE 100 MG/1
100 CAPSULE ORAL 2 TIMES DAILY
Qty: 42 CAPSULE | Refills: 0 | Status: SHIPPED | OUTPATIENT
Start: 2021-03-30 | End: 2021-04-20

## 2021-04-06 ENCOUNTER — OFFICE VISIT (OUTPATIENT)
Dept: WOUND CARE | Facility: HOSPITAL | Age: 51
End: 2021-04-06
Attending: PODIATRIST
Payer: COMMERCIAL

## 2021-04-06 VITALS
DIASTOLIC BLOOD PRESSURE: 79 MMHG | HEART RATE: 84 BPM | TEMPERATURE: 98 F | SYSTOLIC BLOOD PRESSURE: 147 MMHG | RESPIRATION RATE: 18 BRPM

## 2021-04-06 DIAGNOSIS — L97.509 DIABETIC FOOT ULCER WITH OSTEOMYELITIS: ICD-10-CM

## 2021-04-06 DIAGNOSIS — L97.514 ULCER OF RIGHT FOOT, WITH NECROSIS OF BONE: Primary | ICD-10-CM

## 2021-04-06 DIAGNOSIS — E11.69 DIABETIC FOOT ULCER WITH OSTEOMYELITIS: ICD-10-CM

## 2021-04-06 DIAGNOSIS — M86.9 DIABETIC FOOT ULCER WITH OSTEOMYELITIS: ICD-10-CM

## 2021-04-06 DIAGNOSIS — E11.621 DIABETIC FOOT ULCER WITH OSTEOMYELITIS: ICD-10-CM

## 2021-04-06 PROCEDURE — 3051F HG A1C>EQUAL 7.0%<8.0%: CPT | Mod: ,,, | Performed by: PODIATRIST

## 2021-04-06 PROCEDURE — 99213 OFFICE O/P EST LOW 20 MIN: CPT | Mod: 25,,, | Performed by: PODIATRIST

## 2021-04-06 PROCEDURE — 11042 DBRDMT SUBQ TIS 1ST 20SQCM/<: CPT | Mod: ,,, | Performed by: PODIATRIST

## 2021-04-06 PROCEDURE — 11042 PR DEBRIDEMENT, SKIN, SUB-Q TISSUE,=<20 SQ CM: ICD-10-PCS | Mod: ,,, | Performed by: PODIATRIST

## 2021-04-06 PROCEDURE — 3051F PR MOST RECENT HEMOGLOBIN A1C LEVEL 7.0 - < 8.0%: ICD-10-PCS | Mod: ,,, | Performed by: PODIATRIST

## 2021-04-06 PROCEDURE — 11042 DBRDMT SUBQ TIS 1ST 20SQCM/<: CPT | Performed by: PODIATRIST

## 2021-04-06 PROCEDURE — 99213 PR OFFICE/OUTPT VISIT, EST, LEVL III, 20-29 MIN: ICD-10-PCS | Mod: 25,,, | Performed by: PODIATRIST

## 2021-04-06 PROCEDURE — 1126F PR PAIN SEVERITY QUANTIFIED, NO PAIN PRESENT: ICD-10-PCS | Mod: ,,, | Performed by: PODIATRIST

## 2021-04-06 PROCEDURE — 1126F AMNT PAIN NOTED NONE PRSNT: CPT | Mod: ,,, | Performed by: PODIATRIST

## 2021-04-07 ENCOUNTER — DOCUMENT SCAN (OUTPATIENT)
Dept: HOME HEALTH SERVICES | Facility: HOSPITAL | Age: 51
End: 2021-04-07
Payer: COMMERCIAL

## 2021-04-07 ENCOUNTER — PATIENT MESSAGE (OUTPATIENT)
Dept: ADMINISTRATIVE | Facility: HOSPITAL | Age: 51
End: 2021-04-07

## 2021-04-12 RX ORDER — LEVOFLOXACIN 750 MG/1
750 TABLET ORAL DAILY
Qty: 30 TABLET | Refills: 1 | Status: SHIPPED | OUTPATIENT
Start: 2021-04-12 | End: 2021-06-11

## 2021-04-13 ENCOUNTER — OFFICE VISIT (OUTPATIENT)
Dept: WOUND CARE | Facility: HOSPITAL | Age: 51
End: 2021-04-13
Attending: PODIATRIST
Payer: COMMERCIAL

## 2021-04-13 VITALS
TEMPERATURE: 98 F | RESPIRATION RATE: 18 BRPM | HEART RATE: 84 BPM | SYSTOLIC BLOOD PRESSURE: 168 MMHG | DIASTOLIC BLOOD PRESSURE: 84 MMHG

## 2021-04-13 DIAGNOSIS — M86.9 DIABETIC FOOT ULCER WITH OSTEOMYELITIS: ICD-10-CM

## 2021-04-13 DIAGNOSIS — E11.69 DIABETIC FOOT ULCER WITH OSTEOMYELITIS: ICD-10-CM

## 2021-04-13 DIAGNOSIS — L97.509 DIABETIC FOOT ULCER WITH OSTEOMYELITIS: ICD-10-CM

## 2021-04-13 DIAGNOSIS — E11.621 DIABETIC FOOT ULCER WITH OSTEOMYELITIS: ICD-10-CM

## 2021-04-13 DIAGNOSIS — L97.514 ULCER OF RIGHT FOOT, WITH NECROSIS OF BONE: Primary | ICD-10-CM

## 2021-04-13 PROCEDURE — 11042 DBRDMT SUBQ TIS 1ST 20SQCM/<: CPT | Performed by: PODIATRIST

## 2021-04-13 PROCEDURE — 1126F AMNT PAIN NOTED NONE PRSNT: CPT | Mod: ,,, | Performed by: PODIATRIST

## 2021-04-13 PROCEDURE — 1126F PR PAIN SEVERITY QUANTIFIED, NO PAIN PRESENT: ICD-10-PCS | Mod: ,,, | Performed by: PODIATRIST

## 2021-04-13 PROCEDURE — 11042 PR DEBRIDEMENT, SKIN, SUB-Q TISSUE,=<20 SQ CM: ICD-10-PCS | Mod: ,,, | Performed by: PODIATRIST

## 2021-04-13 PROCEDURE — 11042 DBRDMT SUBQ TIS 1ST 20SQCM/<: CPT | Mod: ,,, | Performed by: PODIATRIST

## 2021-04-13 PROCEDURE — 3051F PR MOST RECENT HEMOGLOBIN A1C LEVEL 7.0 - < 8.0%: ICD-10-PCS | Mod: ,,, | Performed by: PODIATRIST

## 2021-04-13 PROCEDURE — 3051F HG A1C>EQUAL 7.0%<8.0%: CPT | Mod: ,,, | Performed by: PODIATRIST

## 2021-04-14 ENCOUNTER — DOCUMENT SCAN (OUTPATIENT)
Dept: HOME HEALTH SERVICES | Facility: HOSPITAL | Age: 51
End: 2021-04-14
Payer: COMMERCIAL

## 2021-04-20 ENCOUNTER — OFFICE VISIT (OUTPATIENT)
Dept: WOUND CARE | Facility: HOSPITAL | Age: 51
End: 2021-04-20
Attending: PODIATRIST
Payer: COMMERCIAL

## 2021-04-20 VITALS
TEMPERATURE: 98 F | HEART RATE: 82 BPM | DIASTOLIC BLOOD PRESSURE: 75 MMHG | SYSTOLIC BLOOD PRESSURE: 131 MMHG | RESPIRATION RATE: 18 BRPM

## 2021-04-20 DIAGNOSIS — M20.31 HALLUX MALLEUS OF RIGHT FOOT: ICD-10-CM

## 2021-04-20 DIAGNOSIS — L97.514 ULCER OF RIGHT FOOT, WITH NECROSIS OF BONE: Primary | ICD-10-CM

## 2021-04-20 DIAGNOSIS — E11.69 DIABETIC FOOT ULCER WITH OSTEOMYELITIS: ICD-10-CM

## 2021-04-20 DIAGNOSIS — L97.509 DIABETIC FOOT ULCER WITH OSTEOMYELITIS: ICD-10-CM

## 2021-04-20 DIAGNOSIS — E11.621 TYPE 2 DIABETES MELLITUS WITH FOOT ULCER, WITHOUT LONG-TERM CURRENT USE OF INSULIN: ICD-10-CM

## 2021-04-20 DIAGNOSIS — M86.9 DIABETIC FOOT ULCER WITH OSTEOMYELITIS: ICD-10-CM

## 2021-04-20 DIAGNOSIS — L97.509 TYPE 2 DIABETES MELLITUS WITH FOOT ULCER, WITHOUT LONG-TERM CURRENT USE OF INSULIN: ICD-10-CM

## 2021-04-20 DIAGNOSIS — E11.621 DIABETIC FOOT ULCER WITH OSTEOMYELITIS: ICD-10-CM

## 2021-04-20 PROCEDURE — 11042 DBRDMT SUBQ TIS 1ST 20SQCM/<: CPT | Mod: ,,, | Performed by: PODIATRIST

## 2021-04-20 PROCEDURE — 1126F PR PAIN SEVERITY QUANTIFIED, NO PAIN PRESENT: ICD-10-PCS | Mod: ,,, | Performed by: PODIATRIST

## 2021-04-20 PROCEDURE — 3051F PR MOST RECENT HEMOGLOBIN A1C LEVEL 7.0 - < 8.0%: ICD-10-PCS | Mod: ,,, | Performed by: PODIATRIST

## 2021-04-20 PROCEDURE — 11042 PR DEBRIDEMENT, SKIN, SUB-Q TISSUE,=<20 SQ CM: ICD-10-PCS | Mod: ,,, | Performed by: PODIATRIST

## 2021-04-20 PROCEDURE — 3051F HG A1C>EQUAL 7.0%<8.0%: CPT | Mod: ,,, | Performed by: PODIATRIST

## 2021-04-20 PROCEDURE — 1126F AMNT PAIN NOTED NONE PRSNT: CPT | Mod: ,,, | Performed by: PODIATRIST

## 2021-04-20 PROCEDURE — 11042 DBRDMT SUBQ TIS 1ST 20SQCM/<: CPT | Performed by: PODIATRIST

## 2021-04-22 ENCOUNTER — PATIENT MESSAGE (OUTPATIENT)
Dept: FAMILY MEDICINE | Facility: CLINIC | Age: 51
End: 2021-04-22

## 2021-04-22 ENCOUNTER — DOCUMENT SCAN (OUTPATIENT)
Dept: HOME HEALTH SERVICES | Facility: HOSPITAL | Age: 51
End: 2021-04-22
Payer: COMMERCIAL

## 2021-04-22 DIAGNOSIS — L97.509 TYPE 2 DIABETES MELLITUS WITH FOOT ULCER, WITHOUT LONG-TERM CURRENT USE OF INSULIN: ICD-10-CM

## 2021-04-22 DIAGNOSIS — E11.621 TYPE 2 DIABETES MELLITUS WITH FOOT ULCER, WITHOUT LONG-TERM CURRENT USE OF INSULIN: ICD-10-CM

## 2021-04-22 DIAGNOSIS — E55.9 VITAMIN D DEFICIENCY: ICD-10-CM

## 2021-04-23 RX ORDER — ASPIRIN 325 MG
50000 TABLET, DELAYED RELEASE (ENTERIC COATED) ORAL
Qty: 24 CAPSULE | Refills: 0 | Status: SHIPPED | OUTPATIENT
Start: 2021-04-26 | End: 2021-07-16

## 2021-04-23 RX ORDER — METFORMIN HYDROCHLORIDE 500 MG/1
500 TABLET ORAL 2 TIMES DAILY WITH MEALS
Qty: 60 TABLET | Refills: 5 | Status: SHIPPED | OUTPATIENT
Start: 2021-04-23 | End: 2021-06-16

## 2021-04-27 ENCOUNTER — OFFICE VISIT (OUTPATIENT)
Dept: WOUND CARE | Facility: HOSPITAL | Age: 51
End: 2021-04-27
Attending: PODIATRIST
Payer: COMMERCIAL

## 2021-04-27 VITALS
DIASTOLIC BLOOD PRESSURE: 94 MMHG | RESPIRATION RATE: 18 BRPM | HEART RATE: 78 BPM | TEMPERATURE: 98 F | SYSTOLIC BLOOD PRESSURE: 150 MMHG

## 2021-04-27 DIAGNOSIS — M86.179 ACUTE OSTEOMYELITIS OF ANKLE OR FOOT, UNSPECIFIED LATERALITY: ICD-10-CM

## 2021-04-27 DIAGNOSIS — L97.509 DIABETIC FOOT ULCER WITH OSTEOMYELITIS: ICD-10-CM

## 2021-04-27 DIAGNOSIS — E11.69 DIABETIC FOOT ULCER WITH OSTEOMYELITIS: ICD-10-CM

## 2021-04-27 DIAGNOSIS — L97.509 TYPE 2 DIABETES MELLITUS WITH FOOT ULCER, WITHOUT LONG-TERM CURRENT USE OF INSULIN: ICD-10-CM

## 2021-04-27 DIAGNOSIS — E11.621 TYPE 2 DIABETES MELLITUS WITH FOOT ULCER, WITHOUT LONG-TERM CURRENT USE OF INSULIN: ICD-10-CM

## 2021-04-27 DIAGNOSIS — M20.31 HALLUX MALLEUS OF RIGHT FOOT: ICD-10-CM

## 2021-04-27 DIAGNOSIS — E11.621 DIABETIC FOOT ULCER WITH OSTEOMYELITIS: ICD-10-CM

## 2021-04-27 DIAGNOSIS — L97.514 ULCER OF TOE OF RIGHT FOOT, WITH NECROSIS OF BONE: Primary | ICD-10-CM

## 2021-04-27 DIAGNOSIS — M86.9 DIABETIC FOOT ULCER WITH OSTEOMYELITIS: ICD-10-CM

## 2021-04-27 PROCEDURE — 11042 DBRDMT SUBQ TIS 1ST 20SQCM/<: CPT | Mod: ,,, | Performed by: PODIATRIST

## 2021-04-27 PROCEDURE — 3051F HG A1C>EQUAL 7.0%<8.0%: CPT | Mod: ,,, | Performed by: PODIATRIST

## 2021-04-27 PROCEDURE — 1126F PR PAIN SEVERITY QUANTIFIED, NO PAIN PRESENT: ICD-10-PCS | Mod: ,,, | Performed by: PODIATRIST

## 2021-04-27 PROCEDURE — 11042 DBRDMT SUBQ TIS 1ST 20SQCM/<: CPT | Performed by: PODIATRIST

## 2021-04-27 PROCEDURE — 3051F PR MOST RECENT HEMOGLOBIN A1C LEVEL 7.0 - < 8.0%: ICD-10-PCS | Mod: ,,, | Performed by: PODIATRIST

## 2021-04-27 PROCEDURE — 11042 PR DEBRIDEMENT, SKIN, SUB-Q TISSUE,=<20 SQ CM: ICD-10-PCS | Mod: ,,, | Performed by: PODIATRIST

## 2021-04-27 PROCEDURE — 1126F AMNT PAIN NOTED NONE PRSNT: CPT | Mod: ,,, | Performed by: PODIATRIST

## 2021-04-28 ENCOUNTER — DOCUMENT SCAN (OUTPATIENT)
Dept: HOME HEALTH SERVICES | Facility: HOSPITAL | Age: 51
End: 2021-04-28
Payer: COMMERCIAL

## 2021-05-07 PROCEDURE — G0179 PR HOME HEALTH MD RECERTIFICATION: ICD-10-PCS | Mod: ,,, | Performed by: HOSPITALIST

## 2021-05-07 PROCEDURE — G0179 MD RECERTIFICATION HHA PT: HCPCS | Mod: ,,, | Performed by: HOSPITALIST

## 2021-05-10 ENCOUNTER — EXTERNAL HOME HEALTH (OUTPATIENT)
Dept: HOME HEALTH SERVICES | Facility: HOSPITAL | Age: 51
End: 2021-05-10
Payer: COMMERCIAL

## 2021-05-10 RX ORDER — DOXYCYCLINE 100 MG/1
100 CAPSULE ORAL 2 TIMES DAILY
Qty: 60 CAPSULE | Refills: 0 | Status: SHIPPED | OUTPATIENT
Start: 2021-05-10 | End: 2021-06-09

## 2021-05-13 ENCOUNTER — LAB VISIT (OUTPATIENT)
Dept: FAMILY MEDICINE | Facility: CLINIC | Age: 51
End: 2021-05-13
Payer: COMMERCIAL

## 2021-05-13 DIAGNOSIS — L97.509 TYPE 2 DIABETES MELLITUS WITH FOOT ULCER, WITHOUT LONG-TERM CURRENT USE OF INSULIN: ICD-10-CM

## 2021-05-13 DIAGNOSIS — E11.621 TYPE 2 DIABETES MELLITUS WITH FOOT ULCER, WITHOUT LONG-TERM CURRENT USE OF INSULIN: ICD-10-CM

## 2021-05-13 DIAGNOSIS — E11.9 DIABETES MELLITUS WITHOUT COMPLICATION: ICD-10-CM

## 2021-05-13 LAB
ALBUMIN SERPL BCP-MCNC: 3.5 G/DL (ref 3.5–5.2)
ALP SERPL-CCNC: 74 U/L (ref 55–135)
ALT SERPL W/O P-5'-P-CCNC: 14 U/L (ref 10–44)
ANION GAP SERPL CALC-SCNC: 7 MMOL/L (ref 8–16)
AST SERPL-CCNC: 17 U/L (ref 10–40)
BASOPHILS # BLD AUTO: 0.02 K/UL (ref 0–0.2)
BASOPHILS NFR BLD: 0.3 % (ref 0–1.9)
BILIRUB SERPL-MCNC: 0.7 MG/DL (ref 0.1–1)
BUN SERPL-MCNC: 18 MG/DL (ref 6–20)
CALCIUM SERPL-MCNC: 8.9 MG/DL (ref 8.7–10.5)
CHLORIDE SERPL-SCNC: 106 MMOL/L (ref 95–110)
CO2 SERPL-SCNC: 31 MMOL/L (ref 23–29)
CREAT SERPL-MCNC: 0.8 MG/DL (ref 0.5–1.4)
DIFFERENTIAL METHOD: ABNORMAL
EOSINOPHIL # BLD AUTO: 0.2 K/UL (ref 0–0.5)
EOSINOPHIL NFR BLD: 2.7 % (ref 0–8)
ERYTHROCYTE [DISTWIDTH] IN BLOOD BY AUTOMATED COUNT: 12.4 % (ref 11.5–14.5)
EST. GFR  (AFRICAN AMERICAN): >60 ML/MIN/1.73 M^2
EST. GFR  (NON AFRICAN AMERICAN): >60 ML/MIN/1.73 M^2
ESTIMATED AVG GLUCOSE: 171 MG/DL (ref 68–131)
GLUCOSE SERPL-MCNC: 205 MG/DL (ref 70–110)
HBA1C MFR BLD: 7.6 % (ref 4.5–6.2)
HCT VFR BLD AUTO: 37.1 % (ref 40–54)
HGB BLD-MCNC: 11.4 G/DL (ref 14–18)
IMM GRANULOCYTES # BLD AUTO: 0.02 K/UL (ref 0–0.04)
IMM GRANULOCYTES NFR BLD AUTO: 0.3 % (ref 0–0.5)
LYMPHOCYTES # BLD AUTO: 2.5 K/UL (ref 1–4.8)
LYMPHOCYTES NFR BLD: 37.8 % (ref 18–48)
MCH RBC QN AUTO: 26.2 PG (ref 27–31)
MCHC RBC AUTO-ENTMCNC: 30.7 G/DL (ref 32–36)
MCV RBC AUTO: 85 FL (ref 82–98)
MONOCYTES # BLD AUTO: 0.5 K/UL (ref 0.3–1)
MONOCYTES NFR BLD: 8 % (ref 4–15)
NEUTROPHILS # BLD AUTO: 3.4 K/UL (ref 1.8–7.7)
NEUTROPHILS NFR BLD: 50.9 % (ref 38–73)
NRBC BLD-RTO: 0 /100 WBC
PLATELET # BLD AUTO: 348 K/UL (ref 150–450)
PMV BLD AUTO: 9.4 FL (ref 9.2–12.9)
POTASSIUM SERPL-SCNC: 4 MMOL/L (ref 3.5–5.1)
PROT SERPL-MCNC: 6.3 G/DL (ref 6–8.4)
RBC # BLD AUTO: 4.35 M/UL (ref 4.6–6.2)
SODIUM SERPL-SCNC: 144 MMOL/L (ref 136–145)
WBC # BLD AUTO: 6.59 K/UL (ref 3.9–12.7)

## 2021-05-13 PROCEDURE — 82043 UR ALBUMIN QUANTITATIVE: CPT | Performed by: FAMILY MEDICINE

## 2021-05-13 PROCEDURE — 83036 HEMOGLOBIN GLYCOSYLATED A1C: CPT | Performed by: FAMILY MEDICINE

## 2021-05-13 PROCEDURE — 82570 ASSAY OF URINE CREATININE: CPT | Performed by: FAMILY MEDICINE

## 2021-05-13 PROCEDURE — 85025 COMPLETE CBC W/AUTO DIFF WBC: CPT | Performed by: FAMILY MEDICINE

## 2021-05-13 PROCEDURE — 80053 COMPREHEN METABOLIC PANEL: CPT | Performed by: FAMILY MEDICINE

## 2021-05-14 LAB
ALBUMIN/CREAT UR: 49.7 UG/MG (ref 0–30)
CREAT UR-MCNC: 195 MG/DL (ref 23–375)
MICROALBUMIN UR DL<=1MG/L-MCNC: 97 UG/ML

## 2021-05-18 ENCOUNTER — OFFICE VISIT (OUTPATIENT)
Dept: WOUND CARE | Facility: HOSPITAL | Age: 51
End: 2021-05-18
Attending: PODIATRIST
Payer: COMMERCIAL

## 2021-05-18 VITALS
TEMPERATURE: 97 F | RESPIRATION RATE: 16 BRPM | HEART RATE: 81 BPM | SYSTOLIC BLOOD PRESSURE: 166 MMHG | DIASTOLIC BLOOD PRESSURE: 97 MMHG

## 2021-05-18 DIAGNOSIS — L97.509 TYPE 2 DIABETES MELLITUS WITH FOOT ULCER, WITHOUT LONG-TERM CURRENT USE OF INSULIN: ICD-10-CM

## 2021-05-18 DIAGNOSIS — M20.31 HALLUX MALLEUS OF RIGHT FOOT: ICD-10-CM

## 2021-05-18 DIAGNOSIS — E11.621 TYPE 2 DIABETES MELLITUS WITH FOOT ULCER, WITHOUT LONG-TERM CURRENT USE OF INSULIN: ICD-10-CM

## 2021-05-18 DIAGNOSIS — L97.514 ULCER OF TOE OF RIGHT FOOT, WITH NECROSIS OF BONE: Primary | ICD-10-CM

## 2021-05-18 DIAGNOSIS — L97.509 DIABETIC FOOT ULCER WITH OSTEOMYELITIS: ICD-10-CM

## 2021-05-18 DIAGNOSIS — E11.621 DIABETIC FOOT ULCER WITH OSTEOMYELITIS: ICD-10-CM

## 2021-05-18 DIAGNOSIS — M86.9 DIABETIC FOOT ULCER WITH OSTEOMYELITIS: ICD-10-CM

## 2021-05-18 DIAGNOSIS — E11.69 DIABETIC FOOT ULCER WITH OSTEOMYELITIS: ICD-10-CM

## 2021-05-18 PROCEDURE — 99213 OFFICE O/P EST LOW 20 MIN: CPT | Mod: 25,,, | Performed by: PODIATRIST

## 2021-05-18 PROCEDURE — 1126F PR PAIN SEVERITY QUANTIFIED, NO PAIN PRESENT: ICD-10-PCS | Mod: ,,, | Performed by: PODIATRIST

## 2021-05-18 PROCEDURE — 3051F PR MOST RECENT HEMOGLOBIN A1C LEVEL 7.0 - < 8.0%: ICD-10-PCS | Mod: ,,, | Performed by: PODIATRIST

## 2021-05-18 PROCEDURE — 1126F AMNT PAIN NOTED NONE PRSNT: CPT | Mod: ,,, | Performed by: PODIATRIST

## 2021-05-18 PROCEDURE — 99213 PR OFFICE/OUTPT VISIT, EST, LEVL III, 20-29 MIN: ICD-10-PCS | Mod: 25,,, | Performed by: PODIATRIST

## 2021-05-18 PROCEDURE — 11042 DBRDMT SUBQ TIS 1ST 20SQCM/<: CPT | Performed by: PODIATRIST

## 2021-05-18 PROCEDURE — 11042 DBRDMT SUBQ TIS 1ST 20SQCM/<: CPT | Mod: ,,, | Performed by: PODIATRIST

## 2021-05-18 PROCEDURE — 11042 PR DEBRIDEMENT, SKIN, SUB-Q TISSUE,=<20 SQ CM: ICD-10-PCS | Mod: ,,, | Performed by: PODIATRIST

## 2021-05-18 PROCEDURE — 3051F HG A1C>EQUAL 7.0%<8.0%: CPT | Mod: ,,, | Performed by: PODIATRIST

## 2021-05-20 PROBLEM — R80.9 MICROALBUMINURIA DUE TO TYPE 2 DIABETES MELLITUS: Status: ACTIVE | Noted: 2021-05-20

## 2021-05-20 PROBLEM — E11.29 MICROALBUMINURIA DUE TO TYPE 2 DIABETES MELLITUS: Status: ACTIVE | Noted: 2021-05-20

## 2021-06-08 ENCOUNTER — OFFICE VISIT (OUTPATIENT)
Dept: WOUND CARE | Facility: HOSPITAL | Age: 51
End: 2021-06-08
Attending: PODIATRIST
Payer: COMMERCIAL

## 2021-06-08 VITALS
SYSTOLIC BLOOD PRESSURE: 151 MMHG | HEART RATE: 77 BPM | TEMPERATURE: 99 F | RESPIRATION RATE: 18 BRPM | DIASTOLIC BLOOD PRESSURE: 90 MMHG

## 2021-06-08 DIAGNOSIS — L97.509 DIABETIC FOOT ULCER WITH OSTEOMYELITIS: ICD-10-CM

## 2021-06-08 DIAGNOSIS — E11.69 DIABETIC FOOT ULCER WITH OSTEOMYELITIS: ICD-10-CM

## 2021-06-08 DIAGNOSIS — E11.621 TYPE 2 DIABETES MELLITUS WITH FOOT ULCER, WITHOUT LONG-TERM CURRENT USE OF INSULIN: ICD-10-CM

## 2021-06-08 DIAGNOSIS — L97.509 TYPE 2 DIABETES MELLITUS WITH FOOT ULCER, WITHOUT LONG-TERM CURRENT USE OF INSULIN: ICD-10-CM

## 2021-06-08 DIAGNOSIS — M86.9 DIABETIC FOOT ULCER WITH OSTEOMYELITIS: ICD-10-CM

## 2021-06-08 DIAGNOSIS — L97.514 ULCER OF TOE OF RIGHT FOOT, WITH NECROSIS OF BONE: ICD-10-CM

## 2021-06-08 DIAGNOSIS — E11.621 DIABETIC FOOT ULCER WITH OSTEOMYELITIS: ICD-10-CM

## 2021-06-08 DIAGNOSIS — M86.179 OTHER ACUTE OSTEOMYELITIS, UNSPECIFIED ANKLE AND FOOT: ICD-10-CM

## 2021-06-08 DIAGNOSIS — M20.31 HALLUX MALLEUS OF RIGHT FOOT: ICD-10-CM

## 2021-06-08 DIAGNOSIS — L97.514 ULCER OF RIGHT FOOT, WITH NECROSIS OF BONE: Primary | ICD-10-CM

## 2021-06-08 PROCEDURE — 99213 PR OFFICE/OUTPT VISIT, EST, LEVL III, 20-29 MIN: ICD-10-PCS | Mod: 25,,, | Performed by: PODIATRIST

## 2021-06-08 PROCEDURE — 1126F PR PAIN SEVERITY QUANTIFIED, NO PAIN PRESENT: ICD-10-PCS | Mod: ,,, | Performed by: PODIATRIST

## 2021-06-08 PROCEDURE — 3051F PR MOST RECENT HEMOGLOBIN A1C LEVEL 7.0 - < 8.0%: ICD-10-PCS | Mod: ,,, | Performed by: PODIATRIST

## 2021-06-08 PROCEDURE — 3051F HG A1C>EQUAL 7.0%<8.0%: CPT | Mod: ,,, | Performed by: PODIATRIST

## 2021-06-08 PROCEDURE — 11043 DBRDMT MUSC&/FSCA 1ST 20/<: CPT | Mod: ,,, | Performed by: PODIATRIST

## 2021-06-08 PROCEDURE — 1126F AMNT PAIN NOTED NONE PRSNT: CPT | Mod: ,,, | Performed by: PODIATRIST

## 2021-06-08 PROCEDURE — 11043 PR DEBRIDEMENT, SKIN, SUB-Q TISSUE,MUSCLE,=<20 SQ CM: ICD-10-PCS | Mod: ,,, | Performed by: PODIATRIST

## 2021-06-08 PROCEDURE — 99213 OFFICE O/P EST LOW 20 MIN: CPT | Mod: 25,,, | Performed by: PODIATRIST

## 2021-06-08 PROCEDURE — 11043 DBRDMT MUSC&/FSCA 1ST 20/<: CPT | Performed by: PODIATRIST

## 2021-06-15 ENCOUNTER — OFFICE VISIT (OUTPATIENT)
Dept: WOUND CARE | Facility: HOSPITAL | Age: 51
End: 2021-06-15
Attending: PODIATRIST
Payer: COMMERCIAL

## 2021-06-15 VITALS
SYSTOLIC BLOOD PRESSURE: 143 MMHG | RESPIRATION RATE: 18 BRPM | TEMPERATURE: 98 F | DIASTOLIC BLOOD PRESSURE: 87 MMHG | HEART RATE: 80 BPM

## 2021-06-15 DIAGNOSIS — M20.31 HALLUX MALLEUS OF RIGHT FOOT: ICD-10-CM

## 2021-06-15 DIAGNOSIS — L97.519 FOOT ULCERATION, RIGHT, WITH UNSPECIFIED SEVERITY: ICD-10-CM

## 2021-06-15 DIAGNOSIS — E11.621 TYPE 2 DIABETES MELLITUS WITH FOOT ULCER, WITHOUT LONG-TERM CURRENT USE OF INSULIN: ICD-10-CM

## 2021-06-15 DIAGNOSIS — E11.29 MICROALBUMINURIA DUE TO TYPE 2 DIABETES MELLITUS: ICD-10-CM

## 2021-06-15 DIAGNOSIS — L08.9 DIABETIC INFECTION OF RIGHT FOOT: ICD-10-CM

## 2021-06-15 DIAGNOSIS — E11.628 DIABETIC INFECTION OF RIGHT FOOT: ICD-10-CM

## 2021-06-15 DIAGNOSIS — L97.515 ULCER OF RIGHT FOOT WITH MUSCLE INVOLVEMENT WITHOUT EVIDENCE OF NECROSIS: Primary | ICD-10-CM

## 2021-06-15 DIAGNOSIS — R80.9 MICROALBUMINURIA DUE TO TYPE 2 DIABETES MELLITUS: ICD-10-CM

## 2021-06-15 DIAGNOSIS — L97.514 ULCER OF RIGHT FOOT, WITH NECROSIS OF BONE: ICD-10-CM

## 2021-06-15 DIAGNOSIS — E11.42 DIABETIC POLYNEUROPATHY ASSOCIATED WITH TYPE 2 DIABETES MELLITUS: ICD-10-CM

## 2021-06-15 DIAGNOSIS — L97.509 TYPE 2 DIABETES MELLITUS WITH FOOT ULCER, WITHOUT LONG-TERM CURRENT USE OF INSULIN: ICD-10-CM

## 2021-06-15 PROCEDURE — 1126F PR PAIN SEVERITY QUANTIFIED, NO PAIN PRESENT: ICD-10-PCS | Mod: ,,, | Performed by: PODIATRIST

## 2021-06-15 PROCEDURE — 11043 PR DEBRIDEMENT, SKIN, SUB-Q TISSUE,MUSCLE,=<20 SQ CM: ICD-10-PCS | Mod: ,,, | Performed by: PODIATRIST

## 2021-06-15 PROCEDURE — 3051F PR MOST RECENT HEMOGLOBIN A1C LEVEL 7.0 - < 8.0%: ICD-10-PCS | Mod: ,,, | Performed by: PODIATRIST

## 2021-06-15 PROCEDURE — 3051F HG A1C>EQUAL 7.0%<8.0%: CPT | Mod: ,,, | Performed by: PODIATRIST

## 2021-06-15 PROCEDURE — 11043 DBRDMT MUSC&/FSCA 1ST 20/<: CPT | Performed by: PODIATRIST

## 2021-06-15 PROCEDURE — 11043 DBRDMT MUSC&/FSCA 1ST 20/<: CPT | Mod: ,,, | Performed by: PODIATRIST

## 2021-06-15 PROCEDURE — 1126F AMNT PAIN NOTED NONE PRSNT: CPT | Mod: ,,, | Performed by: PODIATRIST

## 2021-06-16 ENCOUNTER — OFFICE VISIT (OUTPATIENT)
Dept: FAMILY MEDICINE | Facility: CLINIC | Age: 51
End: 2021-06-16
Payer: COMMERCIAL

## 2021-06-16 ENCOUNTER — PATIENT MESSAGE (OUTPATIENT)
Dept: FAMILY MEDICINE | Facility: CLINIC | Age: 51
End: 2021-06-16

## 2021-06-16 VITALS
TEMPERATURE: 97 F | OXYGEN SATURATION: 98 % | DIASTOLIC BLOOD PRESSURE: 86 MMHG | SYSTOLIC BLOOD PRESSURE: 138 MMHG | HEART RATE: 71 BPM

## 2021-06-16 DIAGNOSIS — R68.82 DECREASED LIBIDO: ICD-10-CM

## 2021-06-16 DIAGNOSIS — Z12.5 PROSTATE CANCER SCREENING: ICD-10-CM

## 2021-06-16 DIAGNOSIS — Z12.11 COLON CANCER SCREENING: ICD-10-CM

## 2021-06-16 DIAGNOSIS — E11.621 TYPE 2 DIABETES MELLITUS WITH FOOT ULCER, WITH LONG-TERM CURRENT USE OF INSULIN: Primary | ICD-10-CM

## 2021-06-16 DIAGNOSIS — E11.42 DIABETIC POLYNEUROPATHY ASSOCIATED WITH TYPE 2 DIABETES MELLITUS: ICD-10-CM

## 2021-06-16 DIAGNOSIS — E11.628 DIABETIC INFECTION OF RIGHT FOOT: ICD-10-CM

## 2021-06-16 DIAGNOSIS — L08.9 DIABETIC INFECTION OF RIGHT FOOT: ICD-10-CM

## 2021-06-16 DIAGNOSIS — L97.509 TYPE 2 DIABETES MELLITUS WITH FOOT ULCER, WITH LONG-TERM CURRENT USE OF INSULIN: Primary | ICD-10-CM

## 2021-06-16 DIAGNOSIS — R80.9 MICROALBUMINURIA DUE TO TYPE 2 DIABETES MELLITUS: ICD-10-CM

## 2021-06-16 DIAGNOSIS — Z79.4 TYPE 2 DIABETES MELLITUS WITH FOOT ULCER, WITH LONG-TERM CURRENT USE OF INSULIN: Primary | ICD-10-CM

## 2021-06-16 DIAGNOSIS — N52.9 IMPOTENCE OF ORGANIC ORIGIN: ICD-10-CM

## 2021-06-16 DIAGNOSIS — N52.9 ERECTILE DYSFUNCTION, UNSPECIFIED ERECTILE DYSFUNCTION TYPE: ICD-10-CM

## 2021-06-16 DIAGNOSIS — E11.29 MICROALBUMINURIA DUE TO TYPE 2 DIABETES MELLITUS: ICD-10-CM

## 2021-06-16 PROBLEM — E78.5 OTHER AND UNSPECIFIED HYPERLIPIDEMIA: Status: ACTIVE | Noted: 2021-06-16

## 2021-06-16 PROCEDURE — 99215 OFFICE O/P EST HI 40 MIN: CPT | Mod: S$GLB,,, | Performed by: FAMILY MEDICINE

## 2021-06-16 PROCEDURE — 1126F AMNT PAIN NOTED NONE PRSNT: CPT | Mod: S$GLB,,, | Performed by: FAMILY MEDICINE

## 2021-06-16 PROCEDURE — 1126F PR PAIN SEVERITY QUANTIFIED, NO PAIN PRESENT: ICD-10-PCS | Mod: S$GLB,,, | Performed by: FAMILY MEDICINE

## 2021-06-16 PROCEDURE — 99999 PR PBB SHADOW E&M-EST. PATIENT-LVL IV: ICD-10-PCS | Mod: PBBFAC,,, | Performed by: FAMILY MEDICINE

## 2021-06-16 PROCEDURE — 3051F PR MOST RECENT HEMOGLOBIN A1C LEVEL 7.0 - < 8.0%: ICD-10-PCS | Mod: S$GLB,,, | Performed by: FAMILY MEDICINE

## 2021-06-16 PROCEDURE — 3051F HG A1C>EQUAL 7.0%<8.0%: CPT | Mod: S$GLB,,, | Performed by: FAMILY MEDICINE

## 2021-06-16 PROCEDURE — 99999 PR PBB SHADOW E&M-EST. PATIENT-LVL IV: CPT | Mod: PBBFAC,,, | Performed by: FAMILY MEDICINE

## 2021-06-16 PROCEDURE — 99215 PR OFFICE/OUTPT VISIT, EST, LEVL V, 40-54 MIN: ICD-10-PCS | Mod: S$GLB,,, | Performed by: FAMILY MEDICINE

## 2021-06-16 RX ORDER — INSULIN LISPRO 200 [IU]/ML
INJECTION, SOLUTION SUBCUTANEOUS
Qty: 5 SYRINGE | Refills: 2 | Status: SHIPPED | OUTPATIENT
Start: 2021-06-16 | End: 2022-12-12 | Stop reason: SDUPTHER

## 2021-06-16 RX ORDER — METFORMIN HYDROCHLORIDE 500 MG/1
500 TABLET, EXTENDED RELEASE ORAL 2 TIMES DAILY WITH MEALS
Qty: 180 TABLET | Refills: 3 | Status: SHIPPED | OUTPATIENT
Start: 2021-06-16 | End: 2023-07-06 | Stop reason: SDUPTHER

## 2021-06-16 RX ORDER — ROSUVASTATIN CALCIUM 5 MG/1
5 TABLET, COATED ORAL DAILY
Qty: 90 TABLET | Refills: 3 | Status: SHIPPED | OUTPATIENT
Start: 2021-06-16 | End: 2023-07-06 | Stop reason: SDUPTHER

## 2021-06-17 ENCOUNTER — TELEPHONE (OUTPATIENT)
Dept: FAMILY MEDICINE | Facility: CLINIC | Age: 51
End: 2021-06-17

## 2021-06-22 ENCOUNTER — OFFICE VISIT (OUTPATIENT)
Dept: WOUND CARE | Facility: HOSPITAL | Age: 51
End: 2021-06-22
Attending: PODIATRIST
Payer: COMMERCIAL

## 2021-06-22 DIAGNOSIS — E11.42 DIABETIC POLYNEUROPATHY ASSOCIATED WITH TYPE 2 DIABETES MELLITUS: ICD-10-CM

## 2021-06-22 DIAGNOSIS — L97.514 ULCER OF RIGHT FOOT, WITH NECROSIS OF BONE: ICD-10-CM

## 2021-06-22 DIAGNOSIS — R80.9 MICROALBUMINURIA DUE TO TYPE 2 DIABETES MELLITUS: ICD-10-CM

## 2021-06-22 DIAGNOSIS — E11.621 TYPE 2 DIABETES MELLITUS WITH FOOT ULCER, WITHOUT LONG-TERM CURRENT USE OF INSULIN: Primary | ICD-10-CM

## 2021-06-22 DIAGNOSIS — L97.509 TYPE 2 DIABETES MELLITUS WITH FOOT ULCER, WITHOUT LONG-TERM CURRENT USE OF INSULIN: Primary | ICD-10-CM

## 2021-06-22 DIAGNOSIS — M20.31 HALLUX MALLEUS OF RIGHT FOOT: ICD-10-CM

## 2021-06-22 DIAGNOSIS — E11.29 MICROALBUMINURIA DUE TO TYPE 2 DIABETES MELLITUS: ICD-10-CM

## 2021-06-22 PROCEDURE — 99499 UNLISTED E&M SERVICE: CPT | Mod: ,,, | Performed by: PODIATRIST

## 2021-06-22 PROCEDURE — 1126F PR PAIN SEVERITY QUANTIFIED, NO PAIN PRESENT: ICD-10-PCS | Mod: ,,, | Performed by: PODIATRIST

## 2021-06-22 PROCEDURE — 3051F PR MOST RECENT HEMOGLOBIN A1C LEVEL 7.0 - < 8.0%: ICD-10-PCS | Mod: CPTII,,, | Performed by: PODIATRIST

## 2021-06-22 PROCEDURE — 11042 PR DEBRIDEMENT, SKIN, SUB-Q TISSUE,=<20 SQ CM: ICD-10-PCS | Mod: ,,, | Performed by: PODIATRIST

## 2021-06-22 PROCEDURE — 3051F HG A1C>EQUAL 7.0%<8.0%: CPT | Mod: CPTII,,, | Performed by: PODIATRIST

## 2021-06-22 PROCEDURE — 11042 DBRDMT SUBQ TIS 1ST 20SQCM/<: CPT | Mod: PN | Performed by: PODIATRIST

## 2021-06-22 PROCEDURE — 1126F AMNT PAIN NOTED NONE PRSNT: CPT | Mod: ,,, | Performed by: PODIATRIST

## 2021-06-22 PROCEDURE — 11042 DBRDMT SUBQ TIS 1ST 20SQCM/<: CPT | Mod: ,,, | Performed by: PODIATRIST

## 2021-06-22 PROCEDURE — 99499 NO LOS: ICD-10-PCS | Mod: ,,, | Performed by: PODIATRIST

## 2021-06-23 VITALS
SYSTOLIC BLOOD PRESSURE: 172 MMHG | RESPIRATION RATE: 17 BRPM | DIASTOLIC BLOOD PRESSURE: 96 MMHG | HEART RATE: 74 BPM | TEMPERATURE: 98 F

## 2021-07-06 PROCEDURE — G0179 PR HOME HEALTH MD RECERTIFICATION: ICD-10-PCS | Mod: ,,, | Performed by: HOSPITALIST

## 2021-07-06 PROCEDURE — G0179 MD RECERTIFICATION HHA PT: HCPCS | Mod: ,,, | Performed by: HOSPITALIST

## 2021-07-08 ENCOUNTER — EXTERNAL HOME HEALTH (OUTPATIENT)
Dept: HOME HEALTH SERVICES | Facility: HOSPITAL | Age: 51
End: 2021-07-08
Payer: COMMERCIAL

## 2021-07-26 ENCOUNTER — PATIENT MESSAGE (OUTPATIENT)
Dept: FAMILY MEDICINE | Facility: CLINIC | Age: 51
End: 2021-07-26

## 2021-09-04 PROCEDURE — G0179 PR HOME HEALTH MD RECERTIFICATION: ICD-10-PCS | Mod: ,,, | Performed by: HOSPITALIST

## 2021-09-04 PROCEDURE — G0179 MD RECERTIFICATION HHA PT: HCPCS | Mod: ,,, | Performed by: HOSPITALIST

## 2021-09-09 ENCOUNTER — EXTERNAL HOME HEALTH (OUTPATIENT)
Dept: HOME HEALTH SERVICES | Facility: HOSPITAL | Age: 51
End: 2021-09-09
Payer: COMMERCIAL

## 2021-09-27 ENCOUNTER — LAB VISIT (OUTPATIENT)
Dept: FAMILY MEDICINE | Facility: CLINIC | Age: 51
End: 2021-09-27
Payer: COMMERCIAL

## 2021-09-27 DIAGNOSIS — L97.509 TYPE 2 DIABETES MELLITUS WITH FOOT ULCER, WITH LONG-TERM CURRENT USE OF INSULIN: ICD-10-CM

## 2021-09-27 DIAGNOSIS — Z79.4 TYPE 2 DIABETES MELLITUS WITH FOOT ULCER, WITH LONG-TERM CURRENT USE OF INSULIN: ICD-10-CM

## 2021-09-27 DIAGNOSIS — E11.621 TYPE 2 DIABETES MELLITUS WITH FOOT ULCER, WITH LONG-TERM CURRENT USE OF INSULIN: ICD-10-CM

## 2021-09-27 LAB
ALBUMIN SERPL BCP-MCNC: 3.2 G/DL (ref 3.5–5.2)
ALP SERPL-CCNC: 82 U/L (ref 55–135)
ALT SERPL W/O P-5'-P-CCNC: 14 U/L (ref 10–44)
ANION GAP SERPL CALC-SCNC: 9 MMOL/L (ref 8–16)
AST SERPL-CCNC: 17 U/L (ref 10–40)
BASOPHILS # BLD AUTO: 0.03 K/UL (ref 0–0.2)
BASOPHILS NFR BLD: 0.4 % (ref 0–1.9)
BILIRUB SERPL-MCNC: 0.3 MG/DL (ref 0.1–1)
BUN SERPL-MCNC: 15 MG/DL (ref 6–20)
CALCIUM SERPL-MCNC: 9.1 MG/DL (ref 8.7–10.5)
CHLORIDE SERPL-SCNC: 105 MMOL/L (ref 95–110)
CHOLEST SERPL-MCNC: 117 MG/DL (ref 120–199)
CHOLEST/HDLC SERPL: 3.3 {RATIO} (ref 2–5)
CO2 SERPL-SCNC: 28 MMOL/L (ref 23–29)
CREAT SERPL-MCNC: 1.1 MG/DL (ref 0.5–1.4)
DIFFERENTIAL METHOD: ABNORMAL
EOSINOPHIL # BLD AUTO: 0.2 K/UL (ref 0–0.5)
EOSINOPHIL NFR BLD: 2.9 % (ref 0–8)
ERYTHROCYTE [DISTWIDTH] IN BLOOD BY AUTOMATED COUNT: 12.3 % (ref 11.5–14.5)
EST. GFR  (AFRICAN AMERICAN): >60 ML/MIN/1.73 M^2
EST. GFR  (NON AFRICAN AMERICAN): >60 ML/MIN/1.73 M^2
ESTIMATED AVG GLUCOSE: 180 MG/DL (ref 68–131)
GLUCOSE SERPL-MCNC: 170 MG/DL (ref 70–110)
HBA1C MFR BLD: 7.9 % (ref 4–5.6)
HCT VFR BLD AUTO: 39.3 % (ref 40–54)
HDLC SERPL-MCNC: 35 MG/DL (ref 40–75)
HDLC SERPL: 29.9 % (ref 20–50)
HGB BLD-MCNC: 12 G/DL (ref 14–18)
IMM GRANULOCYTES # BLD AUTO: 0.02 K/UL (ref 0–0.04)
IMM GRANULOCYTES NFR BLD AUTO: 0.3 % (ref 0–0.5)
LDLC SERPL CALC-MCNC: 70.2 MG/DL (ref 63–159)
LYMPHOCYTES # BLD AUTO: 2.7 K/UL (ref 1–4.8)
LYMPHOCYTES NFR BLD: 38.6 % (ref 18–48)
MCH RBC QN AUTO: 26.8 PG (ref 27–31)
MCHC RBC AUTO-ENTMCNC: 30.5 G/DL (ref 32–36)
MCV RBC AUTO: 88 FL (ref 82–98)
MONOCYTES # BLD AUTO: 0.5 K/UL (ref 0.3–1)
MONOCYTES NFR BLD: 6.9 % (ref 4–15)
NEUTROPHILS # BLD AUTO: 3.5 K/UL (ref 1.8–7.7)
NEUTROPHILS NFR BLD: 50.9 % (ref 38–73)
NONHDLC SERPL-MCNC: 82 MG/DL
NRBC BLD-RTO: 0 /100 WBC
PLATELET # BLD AUTO: 338 K/UL (ref 150–450)
PMV BLD AUTO: 9.3 FL (ref 9.2–12.9)
POTASSIUM SERPL-SCNC: 4.2 MMOL/L (ref 3.5–5.1)
PROT SERPL-MCNC: 6.7 G/DL (ref 6–8.4)
RBC # BLD AUTO: 4.48 M/UL (ref 4.6–6.2)
SODIUM SERPL-SCNC: 142 MMOL/L (ref 136–145)
TRIGL SERPL-MCNC: 59 MG/DL (ref 30–150)
WBC # BLD AUTO: 6.86 K/UL (ref 3.9–12.7)

## 2021-09-27 PROCEDURE — 82306 VITAMIN D 25 HYDROXY: CPT | Performed by: FAMILY MEDICINE

## 2021-09-27 PROCEDURE — 83036 HEMOGLOBIN GLYCOSYLATED A1C: CPT | Performed by: FAMILY MEDICINE

## 2021-09-27 PROCEDURE — 85025 COMPLETE CBC W/AUTO DIFF WBC: CPT | Performed by: FAMILY MEDICINE

## 2021-09-27 PROCEDURE — 82607 VITAMIN B-12: CPT | Performed by: FAMILY MEDICINE

## 2021-09-27 PROCEDURE — 80061 LIPID PANEL: CPT | Performed by: FAMILY MEDICINE

## 2021-09-27 PROCEDURE — 80053 COMPREHEN METABOLIC PANEL: CPT | Performed by: FAMILY MEDICINE

## 2021-09-28 LAB
25(OH)D3+25(OH)D2 SERPL-MCNC: 17 NG/ML (ref 30–96)
VIT B12 SERPL-MCNC: 736 PG/ML (ref 210–950)

## 2022-01-10 ENCOUNTER — NURSE TRIAGE (OUTPATIENT)
Dept: ADMINISTRATIVE | Facility: CLINIC | Age: 52
End: 2022-01-10
Payer: COMMERCIAL

## 2022-01-10 NOTE — TELEPHONE ENCOUNTER
Caller states that he is currenly taking, daptomytin, post surgical procedure and has noted an increase in his cbg. Pt states currently CBG is 192. Caller denies weakness or vomiting at this time. Caller told to contact PCP or surgeon for further advise.  Pt advised per protocol and verbalized understanding.    Reason for Disposition   [1] Blood glucose 240 - 300 mg/dL (13.3 - 16.7 mmol/L) AND [2] uses insulin (e.g., insulin-dependent, all people with type 1 diabetes)    Additional Information   Negative: Unconscious or difficult to awaken   Negative: Acting confused (e.g., disoriented, slurred speech)   Negative: Very weak (e.g., can't stand)   Negative: Sounds like a life-threatening emergency to the triager   Negative: [1] Vomiting AND [2] signs of dehydration (e.g., very dry mouth, lightheaded, dark urine)   Negative: [1] Blood glucose > 240 mg/dL (13.3 mmol/L) AND [2] rapid breathing   Negative: Blood glucose > 500 mg/dL (27.8 mmol/L)   Negative: [1] Blood glucose > 240 mg/dL (13.3 mmol/L) AND [2] urine ketones moderate-large (or more than 1+)   Negative: [1] Blood glucose > 240 mg/dL (13.3 mmol/L) AND [2] blood ketones > 1.4 mmol/L   Negative: [1] Blood glucose > 240 mg/dL (13.3 mmol/L) AND [2] vomiting AND [3] unable to check for ketones (in blood or urine)   Negative: [1] New-onset diabetes suspected (e.g., frequent urination, weak, weight loss) AND [2] vomiting or rapid breathing   Negative: Vomiting lasts > 4 hours   Negative: Patient sounds very sick or weak to the triager   Negative: Fever > 100.4 F (38.0 C)   Negative: Blood glucose > 400 mg/dL (22.2 mmol/L)   Negative: [1] Blood glucose > 300 mg/dL (16.7 mmol/L) AND [2] two or more times in a row   Negative: Urine ketones moderate - large (or blood ketones > 1.4 mmol/L)   Negative: [1] Caller has URGENT medication or insulin pump question AND [2] triager unable to answer question   Negative: [1] Symptoms of high blood sugar (e.g.,  frequent urination, weak, weight loss) AND [2] not able to test blood glucose   Negative: New-onset diabetes suspected (e.g., frequent urination, weakness, weight loss)   Negative: [1] Caller has NON-URGENT medication or insulin pump question AND [2] triager unable to answer question   Negative: [1] Blood glucose > 300 mg/dL (16.7 mmol/L) AND [2] uses insulin (e.g., insulin-dependent, all people with type 1 diabetes)    Protocols used: DIABETES - HIGH BLOOD SUGAR-A-AH

## 2022-02-22 ENCOUNTER — DOCUMENTATION ONLY (OUTPATIENT)
Dept: FAMILY MEDICINE | Facility: CLINIC | Age: 52
End: 2022-02-22

## 2022-02-22 ENCOUNTER — TELEPHONE (OUTPATIENT)
Dept: FAMILY MEDICINE | Facility: CLINIC | Age: 52
End: 2022-02-22

## 2022-02-22 NOTE — PROGRESS NOTES
Patient has been notified that the  [email or web address] patient assistance application has been filled out,signed and faxed.           Your fax has been successfully sent to [phone] at [phone].  ------------------------------------------------------------  From: [id number]  ------------------------------------------------------------  2/22/2022 7:26:57 AM Transmission Record          Sent to [phone] with remote ID "          Result: (0/339;0/0) Success          Page record: 1 - 6          Elapsed time: 05:20 on channel 15

## 2022-02-22 NOTE — TELEPHONE ENCOUNTER
[facility] application PROVIDER SECTION 7&8 has been completed by me and signed. Please fax to appropriate number on application and notify patient. Leave orig up front for pickup and send copy for SCANNING. Thank you!

## 2022-02-24 ENCOUNTER — PATIENT MESSAGE (OUTPATIENT)
Dept: FAMILY MEDICINE | Facility: CLINIC | Age: 52
End: 2022-02-24

## 2022-03-04 ENCOUNTER — DOCUMENTATION ONLY (OUTPATIENT)
Dept: FAMILY MEDICINE | Facility: CLINIC | Age: 52
End: 2022-03-04
Payer: COMMERCIAL

## 2022-03-04 NOTE — PROGRESS NOTES
Patient assistance approved for Lantus 100units/ml through [employer] Patient Connnection Program and is eligible for this therapy until 02/22/2023.

## 2022-04-25 LAB
LEFT EYE DM RETINOPATHY: POSITIVE
RIGHT EYE DM RETINOPATHY: POSITIVE

## 2022-04-27 ENCOUNTER — PATIENT OUTREACH (OUTPATIENT)
Dept: ADMINISTRATIVE | Facility: HOSPITAL | Age: 52
End: 2022-04-27
Payer: COMMERCIAL

## 2022-04-27 NOTE — PROGRESS NOTES
Population Health Outreach.     The following record(s)  below were uploaded for Health Maintenance .    MAMMOGRAM SCREENING            PAP SMEAR  HPV SCREENING   CHLAMYDIA SCREENING    MAMMOGRAM SCREENING    DEXA SCREENING           HEMOGLOBIN A1c  LIPID PANEL  URINE MICROALBUMIN  COMPLETE METABOLIC PANEL  DM FOOT EXAM  (X) DM EYE EXAM    COLONOSCOPY       FIT  KIT  COLOGUARD    HEP C SCREENING  HIV SCREENING    ABDOMINAL AORTA ANEURYSM SCREENING (CT OF ABDOMEN)  LOW DOSE CT SCREENING (CXR)     IMMUNIZATIONS

## 2022-05-02 DIAGNOSIS — E11.319 DIABETIC RETINOPATHY OF BOTH EYES ASSOCIATED WITH TYPE 2 DIABETES MELLITUS, MACULAR EDEMA PRESENCE UNSPECIFIED, UNSPECIFIED RETINOPATHY SEVERITY: Primary | ICD-10-CM

## 2022-05-02 DIAGNOSIS — E11.621 TYPE 2 DIABETES MELLITUS WITH FOOT ULCER, WITH LONG-TERM CURRENT USE OF INSULIN: ICD-10-CM

## 2022-05-02 DIAGNOSIS — Z79.4 TYPE 2 DIABETES MELLITUS WITH FOOT ULCER, WITH LONG-TERM CURRENT USE OF INSULIN: ICD-10-CM

## 2022-05-02 DIAGNOSIS — L97.509 TYPE 2 DIABETES MELLITUS WITH FOOT ULCER, WITH LONG-TERM CURRENT USE OF INSULIN: ICD-10-CM

## 2022-05-02 NOTE — PROGRESS NOTES
Diabetic retinopathy bilateral on eye screening, I have placed referral to ophthalmology. Please notify pt.

## 2022-05-24 ENCOUNTER — OFFICE VISIT (OUTPATIENT)
Dept: FAMILY MEDICINE | Facility: CLINIC | Age: 52
End: 2022-05-24
Payer: COMMERCIAL

## 2022-05-24 VITALS
OXYGEN SATURATION: 96 % | HEIGHT: 71 IN | DIASTOLIC BLOOD PRESSURE: 87 MMHG | SYSTOLIC BLOOD PRESSURE: 162 MMHG | BODY MASS INDEX: 35.69 KG/M2 | HEART RATE: 76 BPM | WEIGHT: 254.94 LBS

## 2022-05-24 DIAGNOSIS — E11.69 TYPE 2 DIABETES MELLITUS WITH OTHER SPECIFIED COMPLICATION, WITHOUT LONG-TERM CURRENT USE OF INSULIN: Primary | ICD-10-CM

## 2022-05-24 DIAGNOSIS — D63.8 ANEMIA ASSOCIATED WITH TYPE 2 DIABETES MELLITUS: ICD-10-CM

## 2022-05-24 DIAGNOSIS — E11.69 ANEMIA ASSOCIATED WITH TYPE 2 DIABETES MELLITUS: ICD-10-CM

## 2022-05-24 PROCEDURE — 99999 PR PBB SHADOW E&M-EST. PATIENT-LVL III: CPT | Mod: PBBFAC,,, | Performed by: INTERNAL MEDICINE

## 2022-05-24 PROCEDURE — 3077F SYST BP >= 140 MM HG: CPT | Mod: S$GLB,,, | Performed by: INTERNAL MEDICINE

## 2022-05-24 PROCEDURE — 3079F DIAST BP 80-89 MM HG: CPT | Mod: S$GLB,,, | Performed by: INTERNAL MEDICINE

## 2022-05-24 PROCEDURE — 4010F ACE/ARB THERAPY RXD/TAKEN: CPT | Mod: S$GLB,,, | Performed by: INTERNAL MEDICINE

## 2022-05-24 PROCEDURE — 3051F HG A1C>EQUAL 7.0%<8.0%: CPT | Mod: S$GLB,,, | Performed by: INTERNAL MEDICINE

## 2022-05-24 PROCEDURE — 99213 PR OFFICE/OUTPT VISIT, EST, LEVL III, 20-29 MIN: ICD-10-PCS | Mod: S$GLB,,, | Performed by: INTERNAL MEDICINE

## 2022-05-24 PROCEDURE — 3079F PR MOST RECENT DIASTOLIC BLOOD PRESSURE 80-89 MM HG: ICD-10-PCS | Mod: S$GLB,,, | Performed by: INTERNAL MEDICINE

## 2022-05-24 PROCEDURE — 99213 OFFICE O/P EST LOW 20 MIN: CPT | Mod: S$GLB,,, | Performed by: INTERNAL MEDICINE

## 2022-05-24 PROCEDURE — 99999 PR PBB SHADOW E&M-EST. PATIENT-LVL III: ICD-10-PCS | Mod: PBBFAC,,, | Performed by: INTERNAL MEDICINE

## 2022-05-24 PROCEDURE — 3008F BODY MASS INDEX DOCD: CPT | Mod: S$GLB,,, | Performed by: INTERNAL MEDICINE

## 2022-05-24 PROCEDURE — 3008F PR BODY MASS INDEX (BMI) DOCUMENTED: ICD-10-PCS | Mod: S$GLB,,, | Performed by: INTERNAL MEDICINE

## 2022-05-24 PROCEDURE — 3051F PR MOST RECENT HEMOGLOBIN A1C LEVEL 7.0 - < 8.0%: ICD-10-PCS | Mod: S$GLB,,, | Performed by: INTERNAL MEDICINE

## 2022-05-24 PROCEDURE — 4010F PR ACE/ARB THEARPY RXD/TAKEN: ICD-10-PCS | Mod: S$GLB,,, | Performed by: INTERNAL MEDICINE

## 2022-05-24 PROCEDURE — 3077F PR MOST RECENT SYSTOLIC BLOOD PRESSURE >= 140 MM HG: ICD-10-PCS | Mod: S$GLB,,, | Performed by: INTERNAL MEDICINE

## 2022-05-24 RX ORDER — HYDROCHLOROTHIAZIDE 12.5 MG/1
12.5 TABLET ORAL DAILY
Qty: 30 TABLET | Refills: 11 | Status: SHIPPED | OUTPATIENT
Start: 2022-05-24 | End: 2022-12-15 | Stop reason: SDUPTHER

## 2022-05-24 RX ORDER — LISINOPRIL 5 MG/1
5 TABLET ORAL DAILY
Qty: 90 TABLET | Refills: 3 | Status: SHIPPED | OUTPATIENT
Start: 2022-05-24 | End: 2023-07-06 | Stop reason: SDUPTHER

## 2022-05-24 RX ORDER — GABAPENTIN 100 MG/1
100 CAPSULE ORAL 3 TIMES DAILY
Qty: 90 CAPSULE | Refills: 11 | Status: SHIPPED | OUTPATIENT
Start: 2022-05-24 | End: 2023-07-06 | Stop reason: SDUPTHER

## 2022-05-24 NOTE — PROGRESS NOTES
Subjective:       Patient ID: Garret Munguia is a 52 y.o. male.    Chief Complaint: Follow-up    Follow-up  This is a chronic problem. The current episode started more than 1 year ago. The problem occurs intermittently. The problem has been unchanged. Associated symptoms include weakness. Pertinent negatives include no abdominal pain, chest pain, congestion, coughing, diaphoresis, fatigue, fever, headaches, joint swelling, nausea, neck pain, rash or sore throat. Nothing aggravates the symptoms. He has tried nothing for the symptoms. The treatment provided mild relief.     Review of Systems   Constitutional: Negative for activity change, appetite change, diaphoresis, fatigue, fever and unexpected weight change.   HENT: Negative for nasal congestion, dental problem, ear discharge, ear pain, hearing loss, mouth dryness, postnasal drip, rhinorrhea, sinus pressure/congestion, sore throat and tinnitus.    Eyes: Negative for pain, redness and visual disturbance.   Respiratory: Negative for cough, choking, chest tightness, shortness of breath and wheezing.    Cardiovascular: Negative for chest pain, palpitations and leg swelling.   Gastrointestinal: Negative for abdominal distention, abdominal pain, blood in stool, nausea and reflux.   Endocrine: Negative for cold intolerance, heat intolerance and polyuria.   Genitourinary: Negative for bladder incontinence, dysuria, erectile dysfunction, frequency and genital sores.   Musculoskeletal: Negative for back pain, joint swelling and neck pain.   Integumentary:  Negative for rash, mole/lesion and breast mass.   Allergic/Immunologic: Negative for food allergies.   Neurological: Positive for weakness. Negative for dizziness, tremors, seizures, headaches and memory loss.   Hematological: Negative for adenopathy.   Psychiatric/Behavioral: Negative for behavioral problems and suicidal ideas.   Breast: Negative for mass        Objective:      Physical Exam  Constitutional:        Appearance: Normal appearance. He is obese.   HENT:      Right Ear: Tympanic membrane, ear canal and external ear normal.      Left Ear: Tympanic membrane, ear canal and external ear normal.      Nose: Nose normal.   Eyes:      Conjunctiva/sclera: Conjunctivae normal.      Pupils: Pupils are equal, round, and reactive to light.   Cardiovascular:      Rate and Rhythm: Normal rate and regular rhythm.      Pulses: Normal pulses.      Heart sounds: Normal heart sounds.   Pulmonary:      Effort: Pulmonary effort is normal.      Breath sounds: Normal breath sounds.   Abdominal:      General: Abdomen is flat. Bowel sounds are normal.      Palpations: Abdomen is soft.   Musculoskeletal:         General: Normal range of motion.      Cervical back: Normal range of motion and neck supple.      Right lower leg: Edema present.   Skin:     General: Skin is warm.   Neurological:      General: No focal deficit present.      Mental Status: He is alert and oriented to person, place, and time.   Psychiatric:         Mood and Affect: Mood normal.         Thought Content: Thought content normal.         Judgment: Judgment normal.         Assessment/Plan:       Problem List Items Addressed This Visit    None     Visit Diagnoses     Type 2 diabetes mellitus with other specified complication, without long-term current use of insulin    -  Primary    Relevant Orders    CBC Auto Differential    Comprehensive Metabolic Panel    Hemoglobin A1C    C-Peptide    Lipid Panel    X-Ray Shoulder 2 or More Views Left    Testosterone    Prolactin    Anemia associated with type 2 diabetes mellitus        Relevant Orders    Iron           MDM:

## 2022-05-25 ENCOUNTER — HOSPITAL ENCOUNTER (OUTPATIENT)
Dept: RADIOLOGY | Facility: HOSPITAL | Age: 52
Discharge: HOME | End: 2022-05-25
Attending: INTERNAL MEDICINE
Payer: COMMERCIAL

## 2022-05-25 ENCOUNTER — LAB VISIT (OUTPATIENT)
Dept: FAMILY MEDICINE | Facility: CLINIC | Age: 52
End: 2022-05-25
Payer: COMMERCIAL

## 2022-05-25 DIAGNOSIS — E11.69 TYPE 2 DIABETES MELLITUS WITH OTHER SPECIFIED COMPLICATION, WITHOUT LONG-TERM CURRENT USE OF INSULIN: ICD-10-CM

## 2022-05-25 DIAGNOSIS — D63.8 ANEMIA ASSOCIATED WITH TYPE 2 DIABETES MELLITUS: ICD-10-CM

## 2022-05-25 DIAGNOSIS — E11.69 ANEMIA ASSOCIATED WITH TYPE 2 DIABETES MELLITUS: ICD-10-CM

## 2022-05-25 LAB
ALBUMIN SERPL BCP-MCNC: 2.9 G/DL (ref 3.5–5.2)
ALP SERPL-CCNC: 97 U/L (ref 55–135)
ALT SERPL W/O P-5'-P-CCNC: 10 U/L (ref 10–44)
ANION GAP SERPL CALC-SCNC: 11 MMOL/L (ref 8–16)
AST SERPL-CCNC: 13 U/L (ref 10–40)
BASOPHILS # BLD AUTO: 0.04 K/UL (ref 0–0.2)
BASOPHILS NFR BLD: 0.6 % (ref 0–1.9)
BILIRUB SERPL-MCNC: 0.3 MG/DL (ref 0.1–1)
BUN SERPL-MCNC: 19 MG/DL (ref 6–20)
C PEPTIDE SERPL-MCNC: 2.65 NG/ML (ref 0.78–5.19)
CALCIUM SERPL-MCNC: 9.2 MG/DL (ref 8.7–10.5)
CHLORIDE SERPL-SCNC: 102 MMOL/L (ref 95–110)
CHOLEST SERPL-MCNC: 127 MG/DL (ref 120–199)
CHOLEST/HDLC SERPL: 3.5 {RATIO} (ref 2–5)
CO2 SERPL-SCNC: 27 MMOL/L (ref 23–29)
CREAT SERPL-MCNC: 1.1 MG/DL (ref 0.5–1.4)
DIFFERENTIAL METHOD: ABNORMAL
EOSINOPHIL # BLD AUTO: 0.2 K/UL (ref 0–0.5)
EOSINOPHIL NFR BLD: 3.4 % (ref 0–8)
ERYTHROCYTE [DISTWIDTH] IN BLOOD BY AUTOMATED COUNT: 12.6 % (ref 11.5–14.5)
EST. GFR  (AFRICAN AMERICAN): >60 ML/MIN/1.73 M^2
EST. GFR  (NON AFRICAN AMERICAN): >60 ML/MIN/1.73 M^2
ESTIMATED AVG GLUCOSE: 229 MG/DL (ref 68–131)
GLUCOSE SERPL-MCNC: 144 MG/DL (ref 70–110)
HBA1C MFR BLD: 9.6 % (ref 4–5.6)
HCT VFR BLD AUTO: 34.5 % (ref 40–54)
HDLC SERPL-MCNC: 36 MG/DL (ref 40–75)
HDLC SERPL: 28.3 % (ref 20–50)
HGB BLD-MCNC: 11.5 G/DL (ref 14–18)
IMM GRANULOCYTES # BLD AUTO: 0.02 K/UL (ref 0–0.04)
IMM GRANULOCYTES NFR BLD AUTO: 0.3 % (ref 0–0.5)
IRON SERPL-MCNC: 43 UG/DL (ref 45–160)
LDLC SERPL CALC-MCNC: 80 MG/DL (ref 63–159)
LYMPHOCYTES # BLD AUTO: 2.2 K/UL (ref 1–4.8)
LYMPHOCYTES NFR BLD: 32.9 % (ref 18–48)
MCH RBC QN AUTO: 28.9 PG (ref 27–31)
MCHC RBC AUTO-ENTMCNC: 33.3 G/DL (ref 32–36)
MCV RBC AUTO: 87 FL (ref 82–98)
MONOCYTES # BLD AUTO: 0.5 K/UL (ref 0.3–1)
MONOCYTES NFR BLD: 8 % (ref 4–15)
NEUTROPHILS # BLD AUTO: 3.7 K/UL (ref 1.8–7.7)
NEUTROPHILS NFR BLD: 54.8 % (ref 38–73)
NONHDLC SERPL-MCNC: 91 MG/DL
NRBC BLD-RTO: 0 /100 WBC
PLATELET # BLD AUTO: 377 K/UL (ref 150–450)
PMV BLD AUTO: 9.2 FL (ref 9.2–12.9)
POTASSIUM SERPL-SCNC: 4.1 MMOL/L (ref 3.5–5.1)
PROLACTIN SERPL IA-MCNC: 20.1 NG/ML (ref 3.5–19.4)
PROT SERPL-MCNC: 7.1 G/DL (ref 6–8.4)
RBC # BLD AUTO: 3.98 M/UL (ref 4.6–6.2)
SODIUM SERPL-SCNC: 140 MMOL/L (ref 136–145)
TESTOST SERPL-MCNC: 210 NG/DL (ref 304–1227)
TRIGL SERPL-MCNC: 55 MG/DL (ref 30–150)
WBC # BLD AUTO: 6.75 K/UL (ref 3.9–12.7)

## 2022-05-25 PROCEDURE — 83036 HEMOGLOBIN GLYCOSYLATED A1C: CPT | Performed by: INTERNAL MEDICINE

## 2022-05-25 PROCEDURE — 83540 ASSAY OF IRON: CPT | Performed by: INTERNAL MEDICINE

## 2022-05-25 PROCEDURE — 36415 PR COLLECTION VENOUS BLOOD,VENIPUNCTURE: ICD-10-PCS | Mod: S$GLB,,, | Performed by: INTERNAL MEDICINE

## 2022-05-25 PROCEDURE — 80061 LIPID PANEL: CPT | Performed by: INTERNAL MEDICINE

## 2022-05-25 PROCEDURE — 73030 XR SHOULDER COMPLETE 2 OR MORE VIEWS LEFT: ICD-10-PCS | Mod: 26,LT,, | Performed by: RADIOLOGY

## 2022-05-25 PROCEDURE — 36415 COLL VENOUS BLD VENIPUNCTURE: CPT | Mod: S$GLB,,, | Performed by: INTERNAL MEDICINE

## 2022-05-25 PROCEDURE — 73030 X-RAY EXAM OF SHOULDER: CPT | Mod: 26,LT,, | Performed by: RADIOLOGY

## 2022-05-25 PROCEDURE — 84403 ASSAY OF TOTAL TESTOSTERONE: CPT | Performed by: INTERNAL MEDICINE

## 2022-05-25 PROCEDURE — 84146 ASSAY OF PROLACTIN: CPT | Performed by: INTERNAL MEDICINE

## 2022-05-25 PROCEDURE — 85025 COMPLETE CBC W/AUTO DIFF WBC: CPT | Performed by: INTERNAL MEDICINE

## 2022-05-25 PROCEDURE — 84681 ASSAY OF C-PEPTIDE: CPT | Performed by: INTERNAL MEDICINE

## 2022-05-25 PROCEDURE — 73030 X-RAY EXAM OF SHOULDER: CPT | Mod: TC,LT

## 2022-05-25 PROCEDURE — 80053 COMPREHEN METABOLIC PANEL: CPT | Performed by: INTERNAL MEDICINE

## 2022-05-25 NOTE — PROGRESS NOTES
Venipuncture performed with 21 gauge butterfly, x's 1 attempt,  to R Antecubital vein.  Specimens collected per orders.      Pressure dressing applied to site, instructed patient to remove dressing in 10-15 minutes, OK to re-adjust dressing if pressure causing any discomfort, to observe closely for numbness and/or discoloration to hand or fingers, and to notify provider if bleeding persists after applying constant pressure lasting 30 minutes.

## 2022-06-02 ENCOUNTER — OFFICE VISIT (OUTPATIENT)
Dept: FAMILY MEDICINE | Facility: CLINIC | Age: 52
End: 2022-06-02
Payer: COMMERCIAL

## 2022-06-02 VITALS
HEART RATE: 78 BPM | BODY MASS INDEX: 35.68 KG/M2 | WEIGHT: 254.88 LBS | HEIGHT: 71 IN | RESPIRATION RATE: 18 BRPM | DIASTOLIC BLOOD PRESSURE: 80 MMHG | OXYGEN SATURATION: 96 % | SYSTOLIC BLOOD PRESSURE: 147 MMHG

## 2022-06-02 DIAGNOSIS — E29.1 HYPOGONADISM IN MALE: Primary | ICD-10-CM

## 2022-06-02 DIAGNOSIS — R42 DIZZINESS AND GIDDINESS: ICD-10-CM

## 2022-06-02 PROCEDURE — 99999 PR PBB SHADOW E&M-EST. PATIENT-LVL IV: CPT | Mod: PBBFAC,,, | Performed by: INTERNAL MEDICINE

## 2022-06-02 PROCEDURE — 3077F SYST BP >= 140 MM HG: CPT | Mod: S$GLB,,, | Performed by: INTERNAL MEDICINE

## 2022-06-02 PROCEDURE — 3008F PR BODY MASS INDEX (BMI) DOCUMENTED: ICD-10-PCS | Mod: S$GLB,,, | Performed by: INTERNAL MEDICINE

## 2022-06-02 PROCEDURE — 3008F BODY MASS INDEX DOCD: CPT | Mod: S$GLB,,, | Performed by: INTERNAL MEDICINE

## 2022-06-02 PROCEDURE — 4010F PR ACE/ARB THEARPY RXD/TAKEN: ICD-10-PCS | Mod: S$GLB,,, | Performed by: INTERNAL MEDICINE

## 2022-06-02 PROCEDURE — 99999 PR PBB SHADOW E&M-EST. PATIENT-LVL IV: ICD-10-PCS | Mod: PBBFAC,,, | Performed by: INTERNAL MEDICINE

## 2022-06-02 PROCEDURE — 1159F PR MEDICATION LIST DOCUMENTED IN MEDICAL RECORD: ICD-10-PCS | Mod: S$GLB,,, | Performed by: INTERNAL MEDICINE

## 2022-06-02 PROCEDURE — 3077F PR MOST RECENT SYSTOLIC BLOOD PRESSURE >= 140 MM HG: ICD-10-PCS | Mod: S$GLB,,, | Performed by: INTERNAL MEDICINE

## 2022-06-02 PROCEDURE — 3046F PR MOST RECENT HEMOGLOBIN A1C LEVEL > 9.0%: ICD-10-PCS | Mod: S$GLB,,, | Performed by: INTERNAL MEDICINE

## 2022-06-02 PROCEDURE — 4010F ACE/ARB THERAPY RXD/TAKEN: CPT | Mod: S$GLB,,, | Performed by: INTERNAL MEDICINE

## 2022-06-02 PROCEDURE — 3046F HEMOGLOBIN A1C LEVEL >9.0%: CPT | Mod: S$GLB,,, | Performed by: INTERNAL MEDICINE

## 2022-06-02 PROCEDURE — 3079F PR MOST RECENT DIASTOLIC BLOOD PRESSURE 80-89 MM HG: ICD-10-PCS | Mod: S$GLB,,, | Performed by: INTERNAL MEDICINE

## 2022-06-02 PROCEDURE — 3079F DIAST BP 80-89 MM HG: CPT | Mod: S$GLB,,, | Performed by: INTERNAL MEDICINE

## 2022-06-02 PROCEDURE — 1159F MED LIST DOCD IN RCRD: CPT | Mod: S$GLB,,, | Performed by: INTERNAL MEDICINE

## 2022-06-02 PROCEDURE — 99212 OFFICE O/P EST SF 10 MIN: CPT | Mod: S$GLB,,, | Performed by: INTERNAL MEDICINE

## 2022-06-02 PROCEDURE — 99212 PR OFFICE/OUTPT VISIT, EST, LEVL II, 10-19 MIN: ICD-10-PCS | Mod: S$GLB,,, | Performed by: INTERNAL MEDICINE

## 2022-06-02 RX ORDER — FLASH GLUCOSE SENSOR
KIT MISCELLANEOUS
Qty: 2 KIT | Refills: 5 | Status: SHIPPED | OUTPATIENT
Start: 2022-06-02 | End: 2023-01-11 | Stop reason: SDUPTHER

## 2022-06-02 NOTE — PROGRESS NOTES
Subjective:       Patient ID: Garret Munguia is a 52 y.o. male.    Chief Complaint: Follow-up    Follow-up  This is a chronic problem. The current episode started more than 1 year ago. The problem occurs intermittently. The problem has been waxing and waning. Pertinent negatives include no abdominal pain, chest pain, congestion, coughing, diaphoresis, fatigue, fever, headaches, joint swelling, nausea, neck pain, rash, sore throat or weakness. Nothing aggravates the symptoms. He has tried nothing for the symptoms. The treatment provided mild relief.     Review of Systems   Constitutional: Negative for activity change, appetite change, diaphoresis, fatigue, fever and unexpected weight change.   HENT: Negative for nasal congestion, dental problem, ear discharge, ear pain, hearing loss, mouth dryness, postnasal drip, rhinorrhea, sinus pressure/congestion, sore throat and tinnitus.    Eyes: Negative for pain, redness and visual disturbance.   Respiratory: Negative for cough, choking, chest tightness, shortness of breath and wheezing.    Cardiovascular: Negative for chest pain, palpitations and leg swelling.   Gastrointestinal: Negative for abdominal distention, abdominal pain, blood in stool, nausea and reflux.   Endocrine: Negative for cold intolerance, heat intolerance and polyuria.   Genitourinary: Negative for bladder incontinence, dysuria, erectile dysfunction, frequency and genital sores.   Musculoskeletal: Negative for back pain, joint swelling and neck pain.   Integumentary:  Negative for rash, mole/lesion and breast mass.   Allergic/Immunologic: Negative for food allergies.   Neurological: Negative for dizziness, tremors, seizures, weakness, headaches and memory loss.   Hematological: Negative for adenopathy.   Psychiatric/Behavioral: Negative for behavioral problems and suicidal ideas.   Breast: Negative for mass        Objective:      Physical Exam  Constitutional:       Appearance: Normal appearance. He is  obese.   HENT:      Head: Normocephalic and atraumatic.      Right Ear: Tympanic membrane, ear canal and external ear normal.      Left Ear: Tympanic membrane, ear canal and external ear normal.      Nose: Nose normal.      Mouth/Throat:      Mouth: Mucous membranes are moist.      Pharynx: Oropharynx is clear.   Eyes:      Conjunctiva/sclera: Conjunctivae normal.      Pupils: Pupils are equal, round, and reactive to light.   Cardiovascular:      Rate and Rhythm: Normal rate and regular rhythm.      Pulses: Normal pulses.      Heart sounds: Normal heart sounds.   Pulmonary:      Effort: Pulmonary effort is normal.      Breath sounds: Normal breath sounds.   Abdominal:      General: Abdomen is flat. Bowel sounds are normal.      Palpations: Abdomen is soft.   Musculoskeletal:         General: Normal range of motion.      Cervical back: Normal range of motion and neck supple.   Skin:     General: Skin is warm.   Neurological:      General: No focal deficit present.      Mental Status: He is alert and oriented to person, place, and time.   Psychiatric:         Mood and Affect: Mood normal.         Thought Content: Thought content normal.         Judgment: Judgment normal.         Assessment/Plan:       Problem List Items Addressed This Visit    None     Visit Diagnoses     Hypogonadism in male    -  Primary    Relevant Orders    MRI Brain With Contrast           MDM:

## 2022-06-07 ENCOUNTER — PATIENT OUTREACH (OUTPATIENT)
Dept: ADMINISTRATIVE | Facility: HOSPITAL | Age: 52
End: 2022-06-07
Payer: COMMERCIAL

## 2022-06-07 ENCOUNTER — TELEPHONE (OUTPATIENT)
Dept: FAMILY MEDICINE | Facility: CLINIC | Age: 52
End: 2022-06-07
Payer: COMMERCIAL

## 2022-06-07 DIAGNOSIS — Z79.4 TYPE 2 DIABETES MELLITUS WITH FOOT ULCER, WITH LONG-TERM CURRENT USE OF INSULIN: Primary | ICD-10-CM

## 2022-06-07 DIAGNOSIS — L97.509 TYPE 2 DIABETES MELLITUS WITH FOOT ULCER, WITH LONG-TERM CURRENT USE OF INSULIN: Primary | ICD-10-CM

## 2022-06-07 DIAGNOSIS — E11.621 TYPE 2 DIABETES MELLITUS WITH FOOT ULCER, WITH LONG-TERM CURRENT USE OF INSULIN: Primary | ICD-10-CM

## 2022-06-14 ENCOUNTER — HOSPITAL ENCOUNTER (OUTPATIENT)
Dept: RADIOLOGY | Facility: HOSPITAL | Age: 52
Discharge: HOME | End: 2022-06-14
Attending: INTERNAL MEDICINE
Payer: COMMERCIAL

## 2022-06-14 DIAGNOSIS — R42 DIZZINESS AND GIDDINESS: ICD-10-CM

## 2022-06-14 PROCEDURE — 25500020 PHARM REV CODE 255: Performed by: INTERNAL MEDICINE

## 2022-06-14 PROCEDURE — 70553 MRI BRAIN STEM W/O & W/DYE: CPT | Mod: TC

## 2022-06-14 PROCEDURE — A9585 GADOBUTROL INJECTION: HCPCS | Performed by: INTERNAL MEDICINE

## 2022-06-14 PROCEDURE — 70553 MRI BRAIN STEM W/O & W/DYE: CPT | Mod: 26,,, | Performed by: RADIOLOGY

## 2022-06-14 PROCEDURE — 70553 MRI BRAIN W WO CONTRAST: ICD-10-PCS | Mod: 26,,, | Performed by: RADIOLOGY

## 2022-06-14 RX ADMIN — GADOBUTROL 10 ML: 604.72 INJECTION INTRAVENOUS at 09:06

## 2022-06-16 ENCOUNTER — PATIENT MESSAGE (OUTPATIENT)
Dept: ADMINISTRATIVE | Facility: HOSPITAL | Age: 52
End: 2022-06-16
Payer: COMMERCIAL

## 2022-07-27 DIAGNOSIS — Z12.11 COLON CANCER SCREENING: ICD-10-CM

## 2022-08-01 ENCOUNTER — PATIENT MESSAGE (OUTPATIENT)
Dept: ADMINISTRATIVE | Facility: HOSPITAL | Age: 52
End: 2022-08-01
Payer: COMMERCIAL

## 2022-09-13 ENCOUNTER — LAB VISIT (OUTPATIENT)
Dept: FAMILY MEDICINE | Facility: CLINIC | Age: 52
End: 2022-09-13
Payer: COMMERCIAL

## 2022-09-13 DIAGNOSIS — E11.621 TYPE 2 DIABETES MELLITUS WITH FOOT ULCER, WITH LONG-TERM CURRENT USE OF INSULIN: ICD-10-CM

## 2022-09-13 DIAGNOSIS — L97.509 TYPE 2 DIABETES MELLITUS WITH FOOT ULCER, WITH LONG-TERM CURRENT USE OF INSULIN: ICD-10-CM

## 2022-09-13 DIAGNOSIS — Z79.4 TYPE 2 DIABETES MELLITUS WITH FOOT ULCER, WITH LONG-TERM CURRENT USE OF INSULIN: ICD-10-CM

## 2022-09-13 LAB
ESTIMATED AVG GLUCOSE: 226 MG/DL (ref 68–131)
HBA1C MFR BLD: 9.5 % (ref 4–5.6)

## 2022-09-13 PROCEDURE — 83036 HEMOGLOBIN GLYCOSYLATED A1C: CPT | Performed by: FAMILY MEDICINE

## 2022-09-13 NOTE — PROGRESS NOTES
Please schedule follow up for patient, has IDDM and last A1C (today); still uncontrolled at 9.5 (9.6  about 3 months ago.)

## 2022-09-15 ENCOUNTER — TELEPHONE (OUTPATIENT)
Dept: FAMILY MEDICINE | Facility: CLINIC | Age: 52
End: 2022-09-15
Payer: COMMERCIAL

## 2022-09-15 NOTE — TELEPHONE ENCOUNTER
Call placed to patient due to 's orders for scheduling an follow-up appointment. Patient currently not available, message left for return call.    ----- Message from Mae Kan MD sent at 9/13/2022  4:54 PM CDT -----  Please schedule follow up for patient, has IDDM and last A1C (today); still uncontrolled at 9.5 (9.6  about 3 months ago.)

## 2022-09-15 NOTE — TELEPHONE ENCOUNTER
Second call placed to patient due to 's orders due to lab results. Patient unavailable and unable to leave message due to mailbox is full.    ----- Message from Mae Kan MD sent at 9/13/2022  4:54 PM CDT -----  Please schedule follow up for patient, has IDDM and last A1C (today); still uncontrolled at 9.5 (9.6  about 3 months ago.)

## 2022-09-16 ENCOUNTER — TELEPHONE (OUTPATIENT)
Dept: FAMILY MEDICINE | Facility: CLINIC | Age: 52
End: 2022-09-16
Payer: COMMERCIAL

## 2022-09-16 NOTE — TELEPHONE ENCOUNTER
Call placed to patient due to 's orders and lab results, unable to reach patient and lab results. Contacted nurses at [facility] for sending a letter, spoke with Dann Tuttle MA will send letter to patient.    ----- Message from Mae Kan MD sent at 9/13/2022  4:54 PM CDT -----  Please schedule follow up for patient, has IDDM and last A1C (today); still uncontrolled at 9.5 (9.6  about 3 months ago.)

## 2022-11-08 ENCOUNTER — PATIENT OUTREACH (OUTPATIENT)
Dept: ADMINISTRATIVE | Facility: HOSPITAL | Age: 52
End: 2022-11-08
Payer: COMMERCIAL

## 2022-11-08 NOTE — PROGRESS NOTES
Working A1C not at target report. Attempted to reach pt regarding overdue HM. No answer, unable to lvm

## 2022-12-12 DIAGNOSIS — L97.509 TYPE 2 DIABETES MELLITUS WITH FOOT ULCER, WITHOUT LONG-TERM CURRENT USE OF INSULIN: ICD-10-CM

## 2022-12-12 DIAGNOSIS — E11.621 TYPE 2 DIABETES MELLITUS WITH FOOT ULCER, WITHOUT LONG-TERM CURRENT USE OF INSULIN: ICD-10-CM

## 2022-12-12 DIAGNOSIS — Z79.4 TYPE 2 DIABETES MELLITUS WITH FOOT ULCER, WITH LONG-TERM CURRENT USE OF INSULIN: ICD-10-CM

## 2022-12-12 DIAGNOSIS — E11.621 TYPE 2 DIABETES MELLITUS WITH FOOT ULCER, WITH LONG-TERM CURRENT USE OF INSULIN: ICD-10-CM

## 2022-12-12 DIAGNOSIS — L97.509 TYPE 2 DIABETES MELLITUS WITH FOOT ULCER, WITH LONG-TERM CURRENT USE OF INSULIN: ICD-10-CM

## 2022-12-12 NOTE — TELEPHONE ENCOUNTER
----- Message from Carli Pickard sent at 12/12/2022 12:48 PM CST -----  Contact: Patient  Type:  RX Refill Request    Who Called:  Patient    Refill or New Rx: Refill    RX Name and Strength:     insulin (LANTUS SOLOSTAR U-100 INSULIN) glargine 100 units/mL (3mL) SubQ pen  insulin lispro (HUMALOG KWIKPEN INSULIN) 200 unit/mL (3 mL) InPn      How is the patient currently taking it? (ex. 1XDay):    Is this a 30 day or 90 day RX: 30    Preferred Pharmacy with phone number:       [id number] - [place], [place] - [address]  [address]  [place] [address]  Phone: [phone] Fax: [phone]        Local or Mail Order: Local     Ordering Provider: Romario     Would the patient rather a call back or a response via [facility]?  Call    Best Call Back Number: [phone] (home)     Additional Information:

## 2022-12-14 ENCOUNTER — TELEPHONE (OUTPATIENT)
Dept: FAMILY MEDICINE | Facility: CLINIC | Age: 52
End: 2022-12-14
Payer: COMMERCIAL

## 2022-12-14 RX ORDER — INSULIN GLARGINE 100 [IU]/ML
5 INJECTION, SOLUTION SUBCUTANEOUS DAILY
Qty: 1 EACH | Refills: 2 | Status: SHIPPED | OUTPATIENT
Start: 2022-12-14 | End: 2022-12-15 | Stop reason: SDUPTHER

## 2022-12-14 RX ORDER — INSULIN LISPRO 200 [IU]/ML
INJECTION, SOLUTION SUBCUTANEOUS
Qty: 5 EACH | Refills: 2 | Status: SHIPPED | OUTPATIENT
Start: 2022-12-14 | End: 2022-12-15

## 2022-12-14 NOTE — TELEPHONE ENCOUNTER
Nestor Landrum,  The [facility]'s Pharmacist states they need clarity on directions for this patient's Insulin Lispro (Humalog Kwikpen Insulin) 200 mg unit/mL (3 ml) InPn. States need sliding scale directions for QID.  Please review and advise.  Thank you  Signed:  Demond Marie LPN    ----- Message from Aury Victor sent at 12/14/2022 10:10 AM CST -----  Type:Call Back         Who called: [facility]          What is the request in detail: Regarding requesting the directions for the insulin lispro (HUMALOG KWIKPEN INSULIN) 200 unit/mL (3 mL) InPn         Can the clinic reply by [facility]? No          Would the patient rather a call back or a response via [facility]? Call back          Best call back number:[phone]         Additional Information:           Thank You

## 2022-12-15 ENCOUNTER — OFFICE VISIT (OUTPATIENT)
Dept: FAMILY MEDICINE | Facility: CLINIC | Age: 52
End: 2022-12-15
Payer: COMMERCIAL

## 2022-12-15 VITALS
HEIGHT: 71 IN | SYSTOLIC BLOOD PRESSURE: 138 MMHG | BODY MASS INDEX: 36.15 KG/M2 | DIASTOLIC BLOOD PRESSURE: 80 MMHG | HEART RATE: 80 BPM | OXYGEN SATURATION: 98 % | WEIGHT: 258.19 LBS

## 2022-12-15 DIAGNOSIS — L97.509 TYPE 2 DIABETES MELLITUS WITH FOOT ULCER, WITHOUT LONG-TERM CURRENT USE OF INSULIN: ICD-10-CM

## 2022-12-15 DIAGNOSIS — E11.621 TYPE 2 DIABETES MELLITUS WITH FOOT ULCER, WITHOUT LONG-TERM CURRENT USE OF INSULIN: ICD-10-CM

## 2022-12-15 DIAGNOSIS — I10 PRIMARY HYPERTENSION: ICD-10-CM

## 2022-12-15 DIAGNOSIS — L97.509 TYPE 2 DIABETES MELLITUS WITH FOOT ULCER, WITH LONG-TERM CURRENT USE OF INSULIN: Primary | ICD-10-CM

## 2022-12-15 DIAGNOSIS — E11.42 DIABETIC POLYNEUROPATHY ASSOCIATED WITH TYPE 2 DIABETES MELLITUS: ICD-10-CM

## 2022-12-15 DIAGNOSIS — Z79.4 TYPE 2 DIABETES MELLITUS WITH FOOT ULCER, WITH LONG-TERM CURRENT USE OF INSULIN: Primary | ICD-10-CM

## 2022-12-15 DIAGNOSIS — E11.29 MICROALBUMINURIA DUE TO TYPE 2 DIABETES MELLITUS: ICD-10-CM

## 2022-12-15 DIAGNOSIS — E11.621 TYPE 2 DIABETES MELLITUS WITH FOOT ULCER, WITH LONG-TERM CURRENT USE OF INSULIN: Primary | ICD-10-CM

## 2022-12-15 DIAGNOSIS — E78.00 PURE HYPERCHOLESTEROLEMIA: ICD-10-CM

## 2022-12-15 DIAGNOSIS — R80.9 MICROALBUMINURIA DUE TO TYPE 2 DIABETES MELLITUS: ICD-10-CM

## 2022-12-15 PROCEDURE — 3046F HEMOGLOBIN A1C LEVEL >9.0%: CPT | Mod: S$GLB,,, | Performed by: FAMILY MEDICINE

## 2022-12-15 PROCEDURE — 4010F ACE/ARB THERAPY RXD/TAKEN: CPT | Mod: S$GLB,,, | Performed by: FAMILY MEDICINE

## 2022-12-15 PROCEDURE — 1159F PR MEDICATION LIST DOCUMENTED IN MEDICAL RECORD: ICD-10-PCS | Mod: S$GLB,,, | Performed by: FAMILY MEDICINE

## 2022-12-15 PROCEDURE — 3008F BODY MASS INDEX DOCD: CPT | Mod: S$GLB,,, | Performed by: FAMILY MEDICINE

## 2022-12-15 PROCEDURE — 1159F MED LIST DOCD IN RCRD: CPT | Mod: S$GLB,,, | Performed by: FAMILY MEDICINE

## 2022-12-15 PROCEDURE — 3075F SYST BP GE 130 - 139MM HG: CPT | Mod: S$GLB,,, | Performed by: FAMILY MEDICINE

## 2022-12-15 PROCEDURE — 99214 PR OFFICE/OUTPT VISIT, EST, LEVL IV, 30-39 MIN: ICD-10-PCS | Mod: S$GLB,,, | Performed by: FAMILY MEDICINE

## 2022-12-15 PROCEDURE — 3008F PR BODY MASS INDEX (BMI) DOCUMENTED: ICD-10-PCS | Mod: S$GLB,,, | Performed by: FAMILY MEDICINE

## 2022-12-15 PROCEDURE — 3046F PR MOST RECENT HEMOGLOBIN A1C LEVEL > 9.0%: ICD-10-PCS | Mod: S$GLB,,, | Performed by: FAMILY MEDICINE

## 2022-12-15 PROCEDURE — 99999 PR PBB SHADOW E&M-EST. PATIENT-LVL III: CPT | Mod: PBBFAC,,, | Performed by: FAMILY MEDICINE

## 2022-12-15 PROCEDURE — 3079F DIAST BP 80-89 MM HG: CPT | Mod: S$GLB,,, | Performed by: FAMILY MEDICINE

## 2022-12-15 PROCEDURE — 3079F PR MOST RECENT DIASTOLIC BLOOD PRESSURE 80-89 MM HG: ICD-10-PCS | Mod: S$GLB,,, | Performed by: FAMILY MEDICINE

## 2022-12-15 PROCEDURE — 99214 OFFICE O/P EST MOD 30 MIN: CPT | Mod: S$GLB,,, | Performed by: FAMILY MEDICINE

## 2022-12-15 PROCEDURE — 4010F PR ACE/ARB THEARPY RXD/TAKEN: ICD-10-PCS | Mod: S$GLB,,, | Performed by: FAMILY MEDICINE

## 2022-12-15 PROCEDURE — 3075F PR MOST RECENT SYSTOLIC BLOOD PRESS GE 130-139MM HG: ICD-10-PCS | Mod: S$GLB,,, | Performed by: FAMILY MEDICINE

## 2022-12-15 PROCEDURE — 99999 PR PBB SHADOW E&M-EST. PATIENT-LVL III: ICD-10-PCS | Mod: PBBFAC,,, | Performed by: FAMILY MEDICINE

## 2022-12-15 RX ORDER — INSULIN GLARGINE 100 [IU]/ML
4 INJECTION, SOLUTION SUBCUTANEOUS 2 TIMES DAILY
Qty: 1 EACH | Refills: 2 | Status: SHIPPED | OUTPATIENT
Start: 2022-12-15 | End: 2023-02-03 | Stop reason: SDUPTHER

## 2022-12-15 RX ORDER — HYDROCHLOROTHIAZIDE 12.5 MG/1
12.5 TABLET ORAL DAILY
Qty: 90 TABLET | Refills: 3 | Status: SHIPPED | OUTPATIENT
Start: 2022-12-15 | End: 2024-01-09

## 2022-12-15 NOTE — PATIENT INSTRUCTIONS
Diabetic Instructions from Dr. Demarco Benítez:    Do your best to reduce how much sugar and processed starches you eat (white rice, pasta, potatoes, chips, crackers, snacks, etc)    Start to count your carbs - (My Fitness Pal is great for this) - limit your carbs to 150grams of carbs per day    Avoid any food item with more than 10grams of sugar per serving    Good fruits to eat:  apples, berries, cherries, peaches, plums  Fruits to avoid:  bananas, citrus, grapes, tropical fruits, dried fruits    Most of your food should be vegetables, select fruits, lean proteins (seafood, chicken, turkey), and nuts, beans, eggs    PLANTS AND PROTEIN AS MUCH AS YOU CAN    STOP NOVOLOG.  START LANTUS 4 UNITS TWICE A DAY

## 2022-12-15 NOTE — PROGRESS NOTES
"  Family Medicine Note  [facility]    Chief Complaint   Patient presents with    Establish Care     Medication management        HPI:  52 male diabetic patient not seen in office since June 2022 by Dr. Adam    Patient seems to have a chronic ulceration of his R foot, and actively sees surgery for this  Polyneuropathy contributes largely to this - control has continued to be an issue - average sugar is still 180 or so.  A1C 9.5%  Hyperlipidemia stable on appropriate intensity statin therapy  Blood Pressure at goal on medication, with patient reporting prescription compliance    Currently is in basal bolus therapy - finds this cumbersome and diabetic control isn't quite there    ROS: as above    Vitals:    12/15/22 1517 12/15/22 1531   BP: (!) 142/80 138/80   BP Location: Right arm    Patient Position: Sitting    Pulse: 80    SpO2: 98%    Weight: 117.1 kg (258 lb 2.5 oz)    Height: 5' 11" (1.803 m)       Body mass index is 36.01 kg/m².      General:  AOx3, well nourished and developed in no acute distress  Eyes:  PERRLA, EOMI, vision intact grossly  ENT:  normal hearing, moist oral mucosa  Neck:  trachea midline with no masses or thyromegaly  Heart:  RRR, no murmurs.  No edema noted, extremities warm and well perfused  Lungs:  clear to auscultation bilaterally with symmetric chest movement  Abdomen:  Soft, nontender, nondistended.  Normal bowel sounds  Musculoskeletal:  Normal gait.  Normal posture.  Normal muscular development with no joint swelling.  Neurological:  CN II-XII grossly intact. Symmetric strength and sensation  Psych:  Normal mood and affect.  Able to demonstrate good judgement and personal insight.      Assessment/Plan:    Type 2 diabetes mellitus with foot ulcer, with long-term current use of insulin    Microalbuminuria due to type 2 diabetes mellitus    Diabetic polyneuropathy associated with type 2 diabetes mellitus    Pure hypercholesterolemia    Primary " hypertension    Type 2 diabetes mellitus with foot ulcer, without long-term current use of insulin        Switch to BID basal insulin only to improve glycemic control - will transition to non insulin therapies once sugar under control.  On ACEI  Stable  Stable on statin  At goal

## 2022-12-22 ENCOUNTER — PATIENT OUTREACH (OUTPATIENT)
Dept: ADMINISTRATIVE | Facility: HOSPITAL | Age: 52
End: 2022-12-22
Payer: COMMERCIAL

## 2022-12-22 ENCOUNTER — PATIENT MESSAGE (OUTPATIENT)
Dept: ADMINISTRATIVE | Facility: HOSPITAL | Age: 52
End: 2022-12-22
Payer: COMMERCIAL

## 2022-12-22 DIAGNOSIS — E11.42 DIABETIC POLYNEUROPATHY ASSOCIATED WITH TYPE 2 DIABETES MELLITUS: Primary | ICD-10-CM

## 2022-12-22 DIAGNOSIS — E11.9 DIABETES MELLITUS WITHOUT COMPLICATION: ICD-10-CM

## 2022-12-22 NOTE — PROGRESS NOTES
DIABETIC TESTING DUE  UNABLE TO LEAVE , ACTIVE PORTAL MESSAGE SENT,  OV SCHEDULED  Non-compliant report chart audits for DIABETIC LAB TEST    Outreach to patient in reference to SCHEDULING A LAB APPOINTMENT  Uncontrolled A1C >8    Hemoglobin A1C   Date Value Ref Range Status   09/13/2022 9.5 (H) 4.0 - 5.6 % Final     Comment:     ADA Screening Guidelines:  5.7-6.4%  Consistent with prediabetes  >or=6.5%  Consistent with diabetes    High levels of fetal hemoglobin interfere with the HbA1C  assay. Heterozygous hemoglobin variants (HbS, HgC, etc)do  not significantly interfere with this assay.   However, presence of multiple variants may affect accuracy.     05/25/2022 9.6 (H) 4.0 - 5.6 % Final     Comment:     ADA Screening Guidelines:  5.7-6.4%  Consistent with prediabetes  >or=6.5%  Consistent with diabetes    High levels of fetal hemoglobin interfere with the HbA1C  assay. Heterozygous hemoglobin variants (HbS, HgC, etc)do  not significantly interfere with this assay.   However, presence of multiple variants may affect accuracy.     09/27/2021 7.9 (H) 4.0 - 5.6 % Final     Comment:     ADA Screening Guidelines:  5.7-6.4%  Consistent with prediabetes  >or=6.5%  Consistent with diabetes    High levels of fetal hemoglobin interfere with the HbA1C  assay. Heterozygous hemoglobin variants (HbS, HgC, etc)do  not significantly interfere with this assay.   However, presence of multiple variants may affect accuracy.

## 2022-12-28 ENCOUNTER — TELEPHONE (OUTPATIENT)
Dept: FAMILY MEDICINE | Facility: CLINIC | Age: 52
End: 2022-12-28
Payer: COMMERCIAL

## 2022-12-28 NOTE — TELEPHONE ENCOUNTER
Received notification from [employer] that they need the  max daily dosage for Humalog 200u/ml Kwikpen Inj 3ml(inject into skin QID)(required for insurance )

## 2023-01-11 RX ORDER — FLASH GLUCOSE SENSOR
KIT MISCELLANEOUS
Qty: 2 KIT | Refills: 5 | Status: SHIPPED | OUTPATIENT
Start: 2023-01-11

## 2023-01-17 ENCOUNTER — PATIENT MESSAGE (OUTPATIENT)
Dept: ADMINISTRATIVE | Facility: HOSPITAL | Age: 53
End: 2023-01-17
Payer: COMMERCIAL

## 2023-02-03 DIAGNOSIS — L97.509 TYPE 2 DIABETES MELLITUS WITH FOOT ULCER, WITHOUT LONG-TERM CURRENT USE OF INSULIN: ICD-10-CM

## 2023-02-03 DIAGNOSIS — E11.621 TYPE 2 DIABETES MELLITUS WITH FOOT ULCER, WITHOUT LONG-TERM CURRENT USE OF INSULIN: ICD-10-CM

## 2023-02-03 RX ORDER — INSULIN GLARGINE 100 [IU]/ML
4 INJECTION, SOLUTION SUBCUTANEOUS 2 TIMES DAILY
Qty: 1 EACH | Refills: 2 | Status: SHIPPED | OUTPATIENT
Start: 2023-02-03 | End: 2023-03-23 | Stop reason: SDUPTHER

## 2023-03-17 ENCOUNTER — PATIENT MESSAGE (OUTPATIENT)
Dept: ADMINISTRATIVE | Facility: HOSPITAL | Age: 53
End: 2023-03-17
Payer: COMMERCIAL

## 2023-03-27 ENCOUNTER — PATIENT MESSAGE (OUTPATIENT)
Dept: LAB | Facility: CLINIC | Age: 53
End: 2023-03-27
Payer: COMMERCIAL

## 2023-03-28 ENCOUNTER — LAB VISIT (OUTPATIENT)
Dept: LAB | Facility: HOSPITAL | Age: 53
End: 2023-03-28
Payer: COMMERCIAL

## 2023-03-28 ENCOUNTER — TELEPHONE (OUTPATIENT)
Dept: LAB | Facility: HOSPITAL | Age: 53
End: 2023-03-28
Payer: COMMERCIAL

## 2023-03-28 DIAGNOSIS — E11.9 DIABETES MELLITUS WITHOUT COMPLICATION: ICD-10-CM

## 2023-03-28 LAB
ESTIMATED AVG GLUCOSE: 214 MG/DL (ref 68–131)
HBA1C MFR BLD: 9.1 % (ref 4–5.6)

## 2023-03-28 PROCEDURE — 36415 COLL VENOUS BLD VENIPUNCTURE: CPT | Performed by: FAMILY MEDICINE

## 2023-03-28 PROCEDURE — 83036 HEMOGLOBIN GLYCOSYLATED A1C: CPT | Performed by: FAMILY MEDICINE

## 2023-04-04 ENCOUNTER — PATIENT MESSAGE (OUTPATIENT)
Dept: RESEARCH | Facility: HOSPITAL | Age: 53
End: 2023-04-04
Payer: COMMERCIAL

## 2023-04-06 ENCOUNTER — OFFICE VISIT (OUTPATIENT)
Dept: FAMILY MEDICINE | Facility: CLINIC | Age: 53
End: 2023-04-06
Payer: COMMERCIAL

## 2023-04-06 VITALS
DIASTOLIC BLOOD PRESSURE: 80 MMHG | TEMPERATURE: 98 F | HEART RATE: 76 BPM | WEIGHT: 247.38 LBS | HEIGHT: 71 IN | OXYGEN SATURATION: 97 % | BODY MASS INDEX: 34.63 KG/M2 | SYSTOLIC BLOOD PRESSURE: 130 MMHG

## 2023-04-06 DIAGNOSIS — R80.9 MICROALBUMINURIA DUE TO TYPE 2 DIABETES MELLITUS: ICD-10-CM

## 2023-04-06 DIAGNOSIS — E11.29 MICROALBUMINURIA DUE TO TYPE 2 DIABETES MELLITUS: ICD-10-CM

## 2023-04-06 DIAGNOSIS — L97.519 FOOT ULCERATION, RIGHT, WITH UNSPECIFIED SEVERITY: Primary | ICD-10-CM

## 2023-04-06 DIAGNOSIS — I10 PRIMARY HYPERTENSION: ICD-10-CM

## 2023-04-06 DIAGNOSIS — E11.42 DIABETIC POLYNEUROPATHY ASSOCIATED WITH TYPE 2 DIABETES MELLITUS: ICD-10-CM

## 2023-04-06 DIAGNOSIS — E11.621 TYPE 2 DIABETES MELLITUS WITH FOOT ULCER, WITH LONG-TERM CURRENT USE OF INSULIN: ICD-10-CM

## 2023-04-06 DIAGNOSIS — L97.509 TYPE 2 DIABETES MELLITUS WITH FOOT ULCER, WITH LONG-TERM CURRENT USE OF INSULIN: ICD-10-CM

## 2023-04-06 DIAGNOSIS — E78.00 PURE HYPERCHOLESTEROLEMIA: ICD-10-CM

## 2023-04-06 DIAGNOSIS — Z79.4 TYPE 2 DIABETES MELLITUS WITH FOOT ULCER, WITH LONG-TERM CURRENT USE OF INSULIN: ICD-10-CM

## 2023-04-06 PROBLEM — E78.5 OTHER AND UNSPECIFIED HYPERLIPIDEMIA: Status: RESOLVED | Noted: 2021-06-16 | Resolved: 2023-04-06

## 2023-04-06 PROCEDURE — 3079F DIAST BP 80-89 MM HG: CPT | Mod: S$GLB,,, | Performed by: FAMILY MEDICINE

## 2023-04-06 PROCEDURE — 3008F PR BODY MASS INDEX (BMI) DOCUMENTED: ICD-10-PCS | Mod: S$GLB,,, | Performed by: FAMILY MEDICINE

## 2023-04-06 PROCEDURE — 99999 PR PBB SHADOW E&M-EST. PATIENT-LVL IV: CPT | Mod: PBBFAC,,, | Performed by: FAMILY MEDICINE

## 2023-04-06 PROCEDURE — 3046F HEMOGLOBIN A1C LEVEL >9.0%: CPT | Mod: S$GLB,,, | Performed by: FAMILY MEDICINE

## 2023-04-06 PROCEDURE — 3075F PR MOST RECENT SYSTOLIC BLOOD PRESS GE 130-139MM HG: ICD-10-PCS | Mod: S$GLB,,, | Performed by: FAMILY MEDICINE

## 2023-04-06 PROCEDURE — 3008F BODY MASS INDEX DOCD: CPT | Mod: S$GLB,,, | Performed by: FAMILY MEDICINE

## 2023-04-06 PROCEDURE — 4010F PR ACE/ARB THEARPY RXD/TAKEN: ICD-10-PCS | Mod: S$GLB,,, | Performed by: FAMILY MEDICINE

## 2023-04-06 PROCEDURE — 3079F PR MOST RECENT DIASTOLIC BLOOD PRESSURE 80-89 MM HG: ICD-10-PCS | Mod: S$GLB,,, | Performed by: FAMILY MEDICINE

## 2023-04-06 PROCEDURE — 3046F PR MOST RECENT HEMOGLOBIN A1C LEVEL > 9.0%: ICD-10-PCS | Mod: S$GLB,,, | Performed by: FAMILY MEDICINE

## 2023-04-06 PROCEDURE — 1159F MED LIST DOCD IN RCRD: CPT | Mod: S$GLB,,, | Performed by: FAMILY MEDICINE

## 2023-04-06 PROCEDURE — 4010F ACE/ARB THERAPY RXD/TAKEN: CPT | Mod: S$GLB,,, | Performed by: FAMILY MEDICINE

## 2023-04-06 PROCEDURE — 3075F SYST BP GE 130 - 139MM HG: CPT | Mod: S$GLB,,, | Performed by: FAMILY MEDICINE

## 2023-04-06 PROCEDURE — 99214 PR OFFICE/OUTPT VISIT, EST, LEVL IV, 30-39 MIN: ICD-10-PCS | Mod: S$GLB,,, | Performed by: FAMILY MEDICINE

## 2023-04-06 PROCEDURE — 99214 OFFICE O/P EST MOD 30 MIN: CPT | Mod: S$GLB,,, | Performed by: FAMILY MEDICINE

## 2023-04-06 PROCEDURE — 99999 PR PBB SHADOW E&M-EST. PATIENT-LVL IV: ICD-10-PCS | Mod: PBBFAC,,, | Performed by: FAMILY MEDICINE

## 2023-04-06 PROCEDURE — 1159F PR MEDICATION LIST DOCUMENTED IN MEDICAL RECORD: ICD-10-PCS | Mod: S$GLB,,, | Performed by: FAMILY MEDICINE

## 2023-04-06 NOTE — PROGRESS NOTES
"    [facility] [place], [place]    Family Medicine Office Visit    Chief Complaint   Patient presents with    Follow-up        HPI:follow up chronic conditions - diabetic control has been quite difficult historically, but most recent A1C has improved slightly to 9.1% - changed to BID long acting insulin las visit  Blood Pressure at goal on medication, with patient reporting prescription compliance  Hyperlipidemia stable on appropriate intensity statin therapy  Microalbuminuria stable on ACEI    Still being treated for diabetic foot ulcer - actively seeing wound care, but this is contributing to poor sugar control.  Neuropathy plays large role in this    ROS: as above    Vitals:    04/06/23 1044   BP: 130/80   BP Location: Right arm   Patient Position: Sitting   BP Method: Medium (Manual)   Pulse: 76   Temp: 97.9 °F (36.6 °C)   SpO2: 97%   Weight: 112.2 kg (247 lb 5.7 oz)   Height: 5' 11" (1.803 m)      Body mass index is 34.5 kg/m².      General:  AOx3, well nourished and developed in no acute distress  Eyes:  PERRLA, EOMI, vision intact grossly  ENT:  normal hearing, moist oral mucosa  Neck:  trachea midline with no masses or thyromegaly  Heart:  RRR, no murmurs.  No edema noted, extremities warm and well perfused  Lungs:  clear to auscultation bilaterally with symmetric chest movement  Abdomen:  Soft, nontender, nondistended.  Normal bowel sounds  Musculoskeletal:  Normal gait.  Normal posture.  Normal muscular development with no joint swelling.  Neurological:  CN II-XII grossly intact. Symmetric strength and sensation  Psych:  Normal mood and affect.  Able to demonstrate good judgement and personal insight.  R foot in surgical boot and appropriately bandaged      Assessment/Plan:    1. Foot ulceration, right, with unspecified severity    2. Pure hypercholesterolemia    3. Primary hypertension    4. Diabetic polyneuropathy associated with type 2 diabetes mellitus    5. Type 2 " diabetes mellitus with foot ulcer, with long-term current use of insulin    6. Microalbuminuria due to type 2 diabetes mellitus        Continue local wound care with focus on glycemic control  Stable on statin  At goal  Stable, continue gabapentin  Focus will be on continued glycemic control to improve wound healing.    Stable on ACEI, continue therapy       Colorectal Surgery

## 2023-04-06 NOTE — PATIENT INSTRUCTIONS
Ok to make small increases in insulin to keep average blood sugar below 140    Repeat A1C before next visit in 3 months  
no

## 2023-04-11 ENCOUNTER — PATIENT MESSAGE (OUTPATIENT)
Dept: ADMINISTRATIVE | Facility: HOSPITAL | Age: 53
End: 2023-04-11
Payer: COMMERCIAL

## 2023-05-31 DIAGNOSIS — E11.9 TYPE 2 DIABETES MELLITUS WITHOUT COMPLICATION: ICD-10-CM

## 2023-06-03 DIAGNOSIS — E11.621 TYPE 2 DIABETES MELLITUS WITH FOOT ULCER, WITHOUT LONG-TERM CURRENT USE OF INSULIN: ICD-10-CM

## 2023-06-03 DIAGNOSIS — L97.509 TYPE 2 DIABETES MELLITUS WITH FOOT ULCER, WITHOUT LONG-TERM CURRENT USE OF INSULIN: ICD-10-CM

## 2023-06-05 RX ORDER — PEN NEEDLE, DIABETIC 32GX 5/32"
NEEDLE, DISPOSABLE MISCELLANEOUS
Qty: 200 EACH | Status: SHIPPED | OUTPATIENT
Start: 2023-06-05 | End: 2023-06-12 | Stop reason: SDUPTHER

## 2023-06-06 ENCOUNTER — PATIENT MESSAGE (OUTPATIENT)
Dept: ADMINISTRATIVE | Facility: HOSPITAL | Age: 53
End: 2023-06-06
Payer: COMMERCIAL

## 2023-06-12 DIAGNOSIS — L97.509 TYPE 2 DIABETES MELLITUS WITH FOOT ULCER, WITHOUT LONG-TERM CURRENT USE OF INSULIN: ICD-10-CM

## 2023-06-12 DIAGNOSIS — E11.621 TYPE 2 DIABETES MELLITUS WITH FOOT ULCER, WITHOUT LONG-TERM CURRENT USE OF INSULIN: ICD-10-CM

## 2023-06-12 RX ORDER — PEN NEEDLE, DIABETIC 30 GX3/16"
NEEDLE, DISPOSABLE MISCELLANEOUS
Qty: 200 EACH | Status: SHIPPED | OUTPATIENT
Start: 2023-06-12 | End: 2023-06-29 | Stop reason: SDUPTHER

## 2023-06-12 RX ORDER — FLASH GLUCOSE SENSOR
1 KIT MISCELLANEOUS
Qty: 2 KIT | Refills: 5 | Status: SHIPPED | OUTPATIENT
Start: 2023-06-12 | End: 2023-07-18 | Stop reason: SDUPTHER

## 2023-06-12 NOTE — TELEPHONE ENCOUNTER
"----- Message from Tesha Marin sent at 6/12/2023  8:22 AM CDT -----  Contact: Patient  Type:  RX Refill Request    Who Called:   Patient  Refill or New Rx:  Refill    RX Name and Strength:   BD AALIYAH 2ND GEN PEN NEEDLE 32 gauge x 5/32" Ndle    Preferred Pharmacy with phone number:      [id number] - [place], [place] - [address]  [address]  [facility] [address]  Phone: [phone] Fax: [phone]    Local or Mail Order:  Local  Ordering Provider:  Dr Benítez    Would the patient rather a call back or a response via [facility]?   Call back  Best Call Back Number:  [phone]    Additional Information:   States he is following up on his diabetic medication refills - states pharmacist told him on 6/9 they had not received anything - please call - thank you        "

## 2023-06-12 NOTE — TELEPHONE ENCOUNTER
----- Message from Rusty Canseco sent at 2023  3:01 PM CDT -----  Regarding: Prescription for Harlan 2 Sensor  Patient came into the [facility] stating that [facility] is stating that the sensors prescription is . However, in Epic, it is still syaing that there are five refills remaining.

## 2023-06-12 NOTE — TELEPHONE ENCOUNTER
Last office visit: 4/6/2023    ----- Message from Allyson Taylor sent at 6/12/2023  2:09 PM CDT -----  Contact: pt  Type: Needs Medical Advice         Who Called:pt  Best Call Back Number:[phone]    Additional Information: Requesting a call back regarding  pt needing office to call him. Pt said he said he has been waiting a week for his tiffanie sensor  to be called in and he has been out.      [id number] - [place] - [address]  [address]  [place] [address]  Phone: [phone] Fax: [phone]      Please Advise- Thank you

## 2023-06-17 ENCOUNTER — PATIENT MESSAGE (OUTPATIENT)
Dept: ADMINISTRATIVE | Facility: HOSPITAL | Age: 53
End: 2023-06-17
Payer: COMMERCIAL

## 2023-06-29 ENCOUNTER — TELEPHONE (OUTPATIENT)
Dept: FAMILY MEDICINE | Facility: CLINIC | Age: 53
End: 2023-06-29
Payer: COMMERCIAL

## 2023-06-29 DIAGNOSIS — L97.509 TYPE 2 DIABETES MELLITUS WITH FOOT ULCER, WITHOUT LONG-TERM CURRENT USE OF INSULIN: ICD-10-CM

## 2023-06-29 DIAGNOSIS — E11.621 TYPE 2 DIABETES MELLITUS WITH FOOT ULCER, WITHOUT LONG-TERM CURRENT USE OF INSULIN: ICD-10-CM

## 2023-06-29 NOTE — TELEPHONE ENCOUNTER
----- Message from Mathew Helms sent at 6/29/2023  2:49 PM CDT -----  Contact: self  Type: RX Refill Request        Who Called: patient   Refill or New Rx: refill  RX Name and Strength: diabetic supplies needles, test strips,   How is the patient currently taking it? (ex. 1XDay): as directed   Is this a 30 day or 90 day RX: n/a  Preferred Pharmacy with phone number:   [facility][id number] - [facility] [address] [facility] [address] [facility]  [address] [address] [address]  Phone: [phone] Fax: [phone]  Local or Mail Order: local   Ordering Provider: Dr. Benítez   [facility] Call Back Number: [phone]  Additional Information: [facility] is out of supplies right now. Plz send to pharmacy above. Pt has been out of his supplies cat check Blood sugar.Thanks

## 2023-06-29 NOTE — TELEPHONE ENCOUNTER
----- Message from Melisa Sims sent at 6/28/2023  4:45 PM CDT -----  Contact: PT  Type:  Needs Medical Advice    Who Called: PT  Would the patient rather a call back or a response via [facility]? CALL   Best Call Back Number: [phone] (home)     Additional Information: THANK YOU

## 2023-06-30 RX ORDER — PEN NEEDLE, DIABETIC 30 GX3/16"
NEEDLE, DISPOSABLE MISCELLANEOUS
Qty: 200 EACH | Status: SHIPPED | OUTPATIENT
Start: 2023-06-30

## 2023-06-30 RX ORDER — INSULIN GLARGINE 100 [IU]/ML
4 INJECTION, SOLUTION SUBCUTANEOUS 2 TIMES DAILY
Qty: 1 EACH | Refills: 2 | Status: SHIPPED | OUTPATIENT
Start: 2023-06-30 | End: 2023-11-13

## 2023-07-06 ENCOUNTER — OFFICE VISIT (OUTPATIENT)
Dept: FAMILY MEDICINE | Facility: CLINIC | Age: 53
End: 2023-07-06
Payer: COMMERCIAL

## 2023-07-06 VITALS
DIASTOLIC BLOOD PRESSURE: 70 MMHG | TEMPERATURE: 98 F | HEART RATE: 75 BPM | SYSTOLIC BLOOD PRESSURE: 138 MMHG | HEIGHT: 71 IN | OXYGEN SATURATION: 98 % | BODY MASS INDEX: 34.5 KG/M2

## 2023-07-06 DIAGNOSIS — E11.621 TYPE 2 DIABETES MELLITUS WITH FOOT ULCER, WITH LONG-TERM CURRENT USE OF INSULIN: Primary | ICD-10-CM

## 2023-07-06 DIAGNOSIS — L97.509 TYPE 2 DIABETES MELLITUS WITH FOOT ULCER, WITH LONG-TERM CURRENT USE OF INSULIN: Primary | ICD-10-CM

## 2023-07-06 DIAGNOSIS — Z79.4 TYPE 2 DIABETES MELLITUS WITH FOOT ULCER, WITH LONG-TERM CURRENT USE OF INSULIN: Primary | ICD-10-CM

## 2023-07-06 LAB
ALBUMIN SERPL BCP-MCNC: 2.6 G/DL (ref 3.5–5.2)
ALP SERPL-CCNC: 81 U/L (ref 55–135)
ALT SERPL W/O P-5'-P-CCNC: 21 U/L (ref 10–44)
ANION GAP SERPL CALC-SCNC: 8 MMOL/L (ref 8–16)
AST SERPL-CCNC: 24 U/L (ref 10–40)
BILIRUB SERPL-MCNC: 0.1 MG/DL (ref 0.1–1)
BUN SERPL-MCNC: 34 MG/DL (ref 6–20)
CALCIUM SERPL-MCNC: 9.2 MG/DL (ref 8.7–10.5)
CHLORIDE SERPL-SCNC: 104 MMOL/L (ref 95–110)
CHOLEST SERPL-MCNC: 86 MG/DL (ref 120–199)
CHOLEST/HDLC SERPL: 2.9 {RATIO} (ref 2–5)
CO2 SERPL-SCNC: 28 MMOL/L (ref 23–29)
CREAT SERPL-MCNC: 1.3 MG/DL (ref 0.5–1.4)
EST. GFR  (NO RACE VARIABLE): >60 ML/MIN/1.73 M^2
ESTIMATED AVG GLUCOSE: 229 MG/DL (ref 68–131)
GLUCOSE SERPL-MCNC: 203 MG/DL (ref 70–110)
HBA1C MFR BLD: 9.6 % (ref 4–5.6)
HDLC SERPL-MCNC: 30 MG/DL (ref 40–75)
HDLC SERPL: 34.9 % (ref 20–50)
LDLC SERPL CALC-MCNC: 45.6 MG/DL (ref 63–159)
NONHDLC SERPL-MCNC: 56 MG/DL
POTASSIUM SERPL-SCNC: 4.3 MMOL/L (ref 3.5–5.1)
PROT SERPL-MCNC: 6.7 G/DL (ref 6–8.4)
SODIUM SERPL-SCNC: 140 MMOL/L (ref 136–145)
TRIGL SERPL-MCNC: 52 MG/DL (ref 30–150)

## 2023-07-06 PROCEDURE — 4010F ACE/ARB THERAPY RXD/TAKEN: CPT | Mod: S$GLB,,, | Performed by: FAMILY MEDICINE

## 2023-07-06 PROCEDURE — 3075F SYST BP GE 130 - 139MM HG: CPT | Mod: S$GLB,,, | Performed by: FAMILY MEDICINE

## 2023-07-06 PROCEDURE — 3078F PR MOST RECENT DIASTOLIC BLOOD PRESSURE < 80 MM HG: ICD-10-PCS | Mod: S$GLB,,, | Performed by: FAMILY MEDICINE

## 2023-07-06 PROCEDURE — 83036 HEMOGLOBIN GLYCOSYLATED A1C: CPT | Performed by: FAMILY MEDICINE

## 2023-07-06 PROCEDURE — 1159F MED LIST DOCD IN RCRD: CPT | Mod: S$GLB,,, | Performed by: FAMILY MEDICINE

## 2023-07-06 PROCEDURE — 99999 PR PBB SHADOW E&M-EST. PATIENT-LVL III: ICD-10-PCS | Mod: PBBFAC,,, | Performed by: FAMILY MEDICINE

## 2023-07-06 PROCEDURE — 4010F PR ACE/ARB THEARPY RXD/TAKEN: ICD-10-PCS | Mod: S$GLB,,, | Performed by: FAMILY MEDICINE

## 2023-07-06 PROCEDURE — 3008F BODY MASS INDEX DOCD: CPT | Mod: S$GLB,,, | Performed by: FAMILY MEDICINE

## 2023-07-06 PROCEDURE — 99999 PR PBB SHADOW E&M-EST. PATIENT-LVL III: CPT | Mod: PBBFAC,,, | Performed by: FAMILY MEDICINE

## 2023-07-06 PROCEDURE — 3008F PR BODY MASS INDEX (BMI) DOCUMENTED: ICD-10-PCS | Mod: S$GLB,,, | Performed by: FAMILY MEDICINE

## 2023-07-06 PROCEDURE — 3078F DIAST BP <80 MM HG: CPT | Mod: S$GLB,,, | Performed by: FAMILY MEDICINE

## 2023-07-06 PROCEDURE — 80061 LIPID PANEL: CPT | Performed by: FAMILY MEDICINE

## 2023-07-06 PROCEDURE — 3046F PR MOST RECENT HEMOGLOBIN A1C LEVEL > 9.0%: ICD-10-PCS | Mod: S$GLB,,, | Performed by: FAMILY MEDICINE

## 2023-07-06 PROCEDURE — 3046F HEMOGLOBIN A1C LEVEL >9.0%: CPT | Mod: S$GLB,,, | Performed by: FAMILY MEDICINE

## 2023-07-06 PROCEDURE — 99213 PR OFFICE/OUTPT VISIT, EST, LEVL III, 20-29 MIN: ICD-10-PCS | Mod: S$GLB,,, | Performed by: FAMILY MEDICINE

## 2023-07-06 PROCEDURE — 99213 OFFICE O/P EST LOW 20 MIN: CPT | Mod: S$GLB,,, | Performed by: FAMILY MEDICINE

## 2023-07-06 PROCEDURE — 1159F PR MEDICATION LIST DOCUMENTED IN MEDICAL RECORD: ICD-10-PCS | Mod: S$GLB,,, | Performed by: FAMILY MEDICINE

## 2023-07-06 PROCEDURE — 3075F PR MOST RECENT SYSTOLIC BLOOD PRESS GE 130-139MM HG: ICD-10-PCS | Mod: S$GLB,,, | Performed by: FAMILY MEDICINE

## 2023-07-06 PROCEDURE — 80053 COMPREHEN METABOLIC PANEL: CPT | Performed by: FAMILY MEDICINE

## 2023-07-06 RX ORDER — ATORVASTATIN CALCIUM 40 MG/1
40 TABLET, FILM COATED ORAL
COMMUNITY
Start: 2023-06-18 | End: 2023-07-06

## 2023-07-06 RX ORDER — LISINOPRIL 5 MG/1
5 TABLET ORAL DAILY
Qty: 90 TABLET | Refills: 0 | Status: SHIPPED | OUTPATIENT
Start: 2023-07-06 | End: 2024-01-05

## 2023-07-06 RX ORDER — METFORMIN HYDROCHLORIDE 500 MG/1
500 TABLET, EXTENDED RELEASE ORAL 2 TIMES DAILY WITH MEALS
Qty: 180 TABLET | Refills: 0 | Status: SHIPPED | OUTPATIENT
Start: 2023-07-06 | End: 2023-07-06

## 2023-07-06 RX ORDER — GABAPENTIN 100 MG/1
100 CAPSULE ORAL 3 TIMES DAILY
Qty: 90 CAPSULE | Refills: 3 | Status: SHIPPED | OUTPATIENT
Start: 2023-07-06 | End: 2024-07-05

## 2023-07-06 RX ORDER — AMOXICILLIN AND CLAVULANATE POTASSIUM 875; 125 MG/1; MG/1
1 TABLET, FILM COATED ORAL 2 TIMES DAILY
COMMUNITY
Start: 2023-06-13 | End: 2023-07-06

## 2023-07-06 RX ORDER — PREGABALIN 75 MG/1
75 CAPSULE ORAL
COMMUNITY
End: 2023-07-06

## 2023-07-06 RX ORDER — ROSUVASTATIN CALCIUM 5 MG/1
5 TABLET, COATED ORAL DAILY
Qty: 90 TABLET | Refills: 0 | Status: SHIPPED | OUTPATIENT
Start: 2023-07-06 | End: 2024-01-05

## 2023-07-06 RX ORDER — CLOPIDOGREL BISULFATE 75 MG/1
75 TABLET ORAL
COMMUNITY
Start: 2023-06-18

## 2023-07-06 RX ORDER — GENTAMICIN SULFATE 590MCG/MG
POWDER (GRAM) MISCELLANEOUS
COMMUNITY
Start: 2023-05-18

## 2023-07-06 NOTE — PROGRESS NOTES
Subjective     Patient ID: Garret Munguia is a 53 y.o. male.    Chief Complaint: Follow-up    Urgent care visit pt unable to see his PCP. Pt not much of a historian.     DM w/peripheral neuropathy: pt states his compliant with his medications, but does not know exactly what he takes or how much. States his freestyle meter has been giving random readings and he would like to get the testing strips and lancets that go with it. States it's [employer] Free[employer] Blood Glucose Test Strips.    Review of Systems   Constitutional:  Negative for activity change, appetite change, fatigue and fever.   Respiratory:  Negative for cough, chest tightness, shortness of breath and wheezing.    Cardiovascular:  Negative for chest pain, palpitations, leg swelling and claudication.   Gastrointestinal:  Negative for abdominal pain, constipation and diarrhea.   Psychiatric/Behavioral:  Negative for dysphoric mood, sleep disturbance and suicidal ideas. The patient is not nervous/anxious.         Objective     Physical Exam  Vitals reviewed.   Constitutional:       General: He is not in acute distress.     Appearance: Normal appearance. He is not ill-appearing.   Cardiovascular:      Rate and Rhythm: Normal rate.   Pulmonary:      Effort: Pulmonary effort is normal. No respiratory distress.   Musculoskeletal:      Comments: Walking boot left foot.    Neurological:      General: No focal deficit present.      Mental Status: He is alert and oriented to person, place, and time.   Psychiatric:         Mood and Affect: Mood normal.         Behavior: Behavior normal.         Thought Content: Thought content normal.          Assessment and Plan     1. Type 2 diabetes mellitus with foot ulcer, with long-term current use of insulin  -     rosuvastatin (CRESTOR) 5 MG tablet; Take 1 tablet (5 mg total) by mouth once daily. For cholesterol and for diabetes  Dispense: 90 tablet; Refill: 0  -     Discontinue: metFORMIN (GLUCOPHAGE-XR) 500 MG ER  24hr tablet; Take 1 tablet (500 mg total) by mouth 2 (two) times daily with meals.  Dispense: 180 tablet; Refill: 0  -     Comprehensive Metabolic Panel; Future; Expected date: 07/06/2023  -     Hemoglobin A1C    2. Type 2 diabetes mellitus without complication  -     Lipid panel    Other orders  -     lisinopriL (PRINIVIL,ZESTRIL) 5 MG tablet; Take 1 tablet (5 mg total) by mouth once daily.  Dispense: 90 tablet; Refill: 0  -     gabapentin (NEURONTIN) 100 MG capsule; Take 1 capsule (100 mg total) by mouth 3 (three) times daily.  Dispense: 90 capsule; Refill: 3      Labs today per orders by his PCP will add CMP  Refilled Neurontin. Pt has not been taking medication, but states he wants it refilled  Risks, benefits, and side effects were discussed with the patient. All questions were answered to the fullest satisfaction of the patient, and pt verbalized understanding and agreement to treatment plan. Pt was to call his PCP with any new or worsening symptoms, or present to the ER.  Will schedule f/u appointment with his PCP, so he can continue care.          Marie Bronson MD  Family Medicine Physician   [facility]     This note was created using M*Modal voice recognition software that occasionally may misinterpret phrases or words.

## 2023-07-11 ENCOUNTER — PATIENT MESSAGE (OUTPATIENT)
Dept: FAMILY MEDICINE | Facility: CLINIC | Age: 53
End: 2023-07-11
Payer: COMMERCIAL

## 2023-07-17 ENCOUNTER — TELEPHONE (OUTPATIENT)
Dept: FAMILY MEDICINE | Facility: CLINIC | Age: 53
End: 2023-07-17
Payer: COMMERCIAL

## 2023-07-17 NOTE — TELEPHONE ENCOUNTER
Spoke to patient. States he's having issues with his tiffanie monitor. Advised patient Dr. Benítez did send him a direct message that he wants to see the patient in the office regarding his glucose readings. States he needs a new script for his lancets and tiffanie 2 monitor, and states he needs a new reader as well so he can take his readings asap. Pharmacy is [facility]. Please advise.      ----- Message from Willie Santos sent at 7/17/2023  2:27 PM CDT -----  Contact: self  Type:  Patient Returning Call    Who Called:  Patient  Who Left Message for Patient:  Demond  Does the patient know what this is regarding?:  yes  Best Call Back Number:  [phone] or [phone]    Additional Information:  Pt is ret a call.Please call back

## 2023-07-17 NOTE — TELEPHONE ENCOUNTER
Call placed to patient due to message left, currently not available message left for return call.    ----- Message from Kamryn Lee sent at 7/17/2023 10:04 AM CDT -----  Type: Needs Medical Advice  Who Called:  pt  Best Call Back Number: [phone]  Additional Information: pt would like to speak with the nurse about replacing his freestyle monitor, pl call bk to advise thanks

## 2023-07-18 RX ORDER — FLASH GLUCOSE SENSOR
1 KIT MISCELLANEOUS
Qty: 2 KIT | Refills: 5 | Status: SHIPPED | OUTPATIENT
Start: 2023-07-18 | End: 2023-10-16

## 2023-07-18 RX ORDER — FLASH GLUCOSE SCANNING READER
EACH MISCELLANEOUS
Qty: 1 EACH | Refills: 3 | Status: SHIPPED | OUTPATIENT
Start: 2023-07-18

## 2023-07-18 RX ORDER — LANCETS 28 GAUGE
EACH MISCELLANEOUS
Qty: 100 EACH | Refills: 6 | Status: SHIPPED | OUTPATIENT
Start: 2023-07-18

## 2023-07-25 ENCOUNTER — PATIENT MESSAGE (OUTPATIENT)
Dept: ADMINISTRATIVE | Facility: HOSPITAL | Age: 53
End: 2023-07-25
Payer: COMMERCIAL

## 2023-08-14 ENCOUNTER — PATIENT MESSAGE (OUTPATIENT)
Dept: ADMINISTRATIVE | Facility: HOSPITAL | Age: 53
End: 2023-08-14
Payer: COMMERCIAL

## 2023-08-30 ENCOUNTER — TELEPHONE (OUTPATIENT)
Dept: FAMILY MEDICINE | Facility: CLINIC | Age: 53
End: 2023-08-30
Payer: COMMERCIAL

## 2023-08-30 NOTE — TELEPHONE ENCOUNTER
----- Message from Na Rouse sent at 8/30/2023  7:51 AM CDT -----  Contact: Patient  Type:  Needs Medical Advice    Who Called: patient     Would the patient rather a call back or a response via [facility]? Call     Best Call Back Number: [phone] (home)      Additional Information: Patient would like to speak with the nurse in regards to being out of insulin. He stated that the his refill is not going through and does not understand why it is not going through.     Please call to advise

## 2023-09-13 ENCOUNTER — PATIENT MESSAGE (OUTPATIENT)
Dept: FAMILY MEDICINE | Facility: CLINIC | Age: 53
End: 2023-09-13
Payer: COMMERCIAL

## 2023-11-03 ENCOUNTER — PATIENT MESSAGE (OUTPATIENT)
Dept: ADMINISTRATIVE | Facility: HOSPITAL | Age: 53
End: 2023-11-03
Payer: COMMERCIAL

## 2023-11-10 ENCOUNTER — TELEPHONE (OUTPATIENT)
Dept: FAMILY MEDICINE | Facility: CLINIC | Age: 53
End: 2023-11-10
Payer: COMMERCIAL

## 2023-11-10 NOTE — TELEPHONE ENCOUNTER
Nestor Benítez,  Please review message below from [facility] due to coverage of Lantus Solostar U-100 Insulin.  Call placed to [facility] due msg left, spoke w/pharmacy states pt's insurance no longer covers the Lantus Solostar but will cover Basaglar, Levemir, and Tresiba.   Thank you.  Signed:  Demond Marie LPN    ----- Message from Melisa Sims sent at 11/10/2023 11:34 AM CST -----  Type:  Pharmacy Calling to Clarify an RX    Name of Caller:CODY   Pharmacy Name:[facility]   Prescription Name: insulin (LANTUS SOLOSTAR U-100 INSULIN) glargine 100 units/mL SubQ pen  What do they need to clarify?: PLEASE CHANGE MEDICATION- INSUR NO LONGER COVERS ME  Best Call Back Number:[phone]/ FAX # [phone]  Additional Information: THANK YOU

## 2023-11-13 RX ORDER — INSULIN DEGLUDEC 100 U/ML
8 INJECTION, SOLUTION SUBCUTANEOUS DAILY
Qty: 3 ML | Refills: 11 | Status: SHIPPED | OUTPATIENT
Start: 2023-11-13

## 2024-01-05 ENCOUNTER — PATIENT MESSAGE (OUTPATIENT)
Dept: ADMINISTRATIVE | Facility: HOSPITAL | Age: 54
End: 2024-01-05
Payer: COMMERCIAL

## 2024-01-05 DIAGNOSIS — Z79.4 TYPE 2 DIABETES MELLITUS WITH FOOT ULCER, WITH LONG-TERM CURRENT USE OF INSULIN: ICD-10-CM

## 2024-01-05 DIAGNOSIS — L97.509 TYPE 2 DIABETES MELLITUS WITH FOOT ULCER, WITH LONG-TERM CURRENT USE OF INSULIN: ICD-10-CM

## 2024-01-05 DIAGNOSIS — E11.621 TYPE 2 DIABETES MELLITUS WITH FOOT ULCER, WITH LONG-TERM CURRENT USE OF INSULIN: ICD-10-CM

## 2024-01-05 RX ORDER — LISINOPRIL 5 MG/1
5 TABLET ORAL
Qty: 90 TABLET | Refills: 0 | Status: SHIPPED | OUTPATIENT
Start: 2024-01-05

## 2024-01-05 RX ORDER — ROSUVASTATIN CALCIUM 5 MG/1
TABLET, COATED ORAL
Qty: 90 TABLET | Refills: 0 | Status: SHIPPED | OUTPATIENT
Start: 2024-01-05

## 2024-01-09 RX ORDER — HYDROCHLOROTHIAZIDE 12.5 MG/1
12.5 TABLET ORAL
Qty: 90 TABLET | Refills: 3 | Status: SHIPPED | OUTPATIENT
Start: 2024-01-09

## 2024-01-22 ENCOUNTER — PATIENT MESSAGE (OUTPATIENT)
Dept: ADMINISTRATIVE | Facility: HOSPITAL | Age: 54
End: 2024-01-22
Payer: COMMERCIAL

## 2024-02-06 DIAGNOSIS — Z12.11 COLON CANCER SCREENING: ICD-10-CM

## 2024-04-03 ENCOUNTER — PATIENT MESSAGE (OUTPATIENT)
Dept: ADMINISTRATIVE | Facility: HOSPITAL | Age: 54
End: 2024-04-03
Payer: COMMERCIAL

## 2024-04-04 ENCOUNTER — PATIENT MESSAGE (OUTPATIENT)
Dept: ADMINISTRATIVE | Facility: HOSPITAL | Age: 54
End: 2024-04-04
Payer: COMMERCIAL

## 2024-04-24 ENCOUNTER — PATIENT MESSAGE (OUTPATIENT)
Dept: FAMILY MEDICINE | Facility: CLINIC | Age: 54
End: 2024-04-24
Payer: COMMERCIAL

## 2024-05-03 ENCOUNTER — PATIENT MESSAGE (OUTPATIENT)
Dept: ADMINISTRATIVE | Facility: HOSPITAL | Age: 54
End: 2024-05-03
Payer: COMMERCIAL

## 2024-05-09 ENCOUNTER — TELEPHONE (OUTPATIENT)
Dept: FAMILY MEDICINE | Facility: CLINIC | Age: 54
End: 2024-05-09
Payer: COMMERCIAL

## 2024-05-09 NOTE — TELEPHONE ENCOUNTER
----- Message from Demarco Benítez MD sent at 5/9/2024  3:00 PM CDT -----  Please call patient to see if he is still seeing [facility]Dr. Benítez as primary care

## 2024-07-11 ENCOUNTER — TELEPHONE (OUTPATIENT)
Dept: FAMILY MEDICINE | Facility: CLINIC | Age: 54
End: 2024-07-11
Payer: COMMERCIAL

## 2024-07-11 NOTE — TELEPHONE ENCOUNTER
----- Message from Lindsay Costello sent at 7/11/2024  3:48 PM CDT -----  Regarding: Refill  Type:  RX Refill Request    Who Called: Pt    Refill or New Rx:Refill    RX Name and Strength:blood-glucose meter (FREESTYLE INSULINX) [facility]      Preferred Pharmacy with phone number:  [id number] - [place], [place] - [address]  [address]  [place] [address]  Phone: [phone] Fax: [phone]    Local or Mail Order:Local      Would the patient rather a call back or a response via [facility]? Call back    Best Call Back Number:[phone]    Additional Information: Pt requesting a refill, pt sts Rx did make a request few days ago. Thank you

## 2024-07-12 ENCOUNTER — OFFICE VISIT (OUTPATIENT)
Dept: FAMILY MEDICINE | Facility: CLINIC | Age: 54
End: 2024-07-12
Payer: COMMERCIAL

## 2024-07-12 ENCOUNTER — LAB VISIT (OUTPATIENT)
Dept: LAB | Facility: CLINIC | Age: 54
End: 2024-07-12
Payer: COMMERCIAL

## 2024-07-12 VITALS
WEIGHT: 236.63 LBS | BODY MASS INDEX: 33.13 KG/M2 | OXYGEN SATURATION: 98 % | HEIGHT: 71 IN | SYSTOLIC BLOOD PRESSURE: 126 MMHG | HEART RATE: 71 BPM | DIASTOLIC BLOOD PRESSURE: 80 MMHG

## 2024-07-12 DIAGNOSIS — E11.628 DIABETIC INFECTION OF RIGHT FOOT: ICD-10-CM

## 2024-07-12 DIAGNOSIS — L97.509 TYPE 2 DIABETES MELLITUS WITH FOOT ULCER, WITH LONG-TERM CURRENT USE OF INSULIN: ICD-10-CM

## 2024-07-12 DIAGNOSIS — E78.00 PURE HYPERCHOLESTEROLEMIA: ICD-10-CM

## 2024-07-12 DIAGNOSIS — E11.621 TYPE 2 DIABETES MELLITUS WITH FOOT ULCER, WITH LONG-TERM CURRENT USE OF INSULIN: ICD-10-CM

## 2024-07-12 DIAGNOSIS — I10 PRIMARY HYPERTENSION: ICD-10-CM

## 2024-07-12 DIAGNOSIS — Z79.4 TYPE 2 DIABETES MELLITUS WITH FOOT ULCER, WITH LONG-TERM CURRENT USE OF INSULIN: ICD-10-CM

## 2024-07-12 DIAGNOSIS — E11.42 DIABETIC POLYNEUROPATHY ASSOCIATED WITH TYPE 2 DIABETES MELLITUS: Primary | ICD-10-CM

## 2024-07-12 DIAGNOSIS — L08.9 DIABETIC INFECTION OF RIGHT FOOT: ICD-10-CM

## 2024-07-12 DIAGNOSIS — E11.42 DIABETIC POLYNEUROPATHY ASSOCIATED WITH TYPE 2 DIABETES MELLITUS: ICD-10-CM

## 2024-07-12 LAB
ALBUMIN SERPL BCP-MCNC: 2.1 G/DL (ref 3.5–5.2)
ALP SERPL-CCNC: 72 U/L (ref 55–135)
ALT SERPL W/O P-5'-P-CCNC: 9 U/L (ref 10–44)
ANION GAP SERPL CALC-SCNC: 9 MMOL/L (ref 8–16)
AST SERPL-CCNC: 16 U/L (ref 10–40)
BASOPHILS # BLD AUTO: 0.02 K/UL (ref 0–0.2)
BASOPHILS NFR BLD: 0.3 % (ref 0–1.9)
BILIRUB SERPL-MCNC: 0.2 MG/DL (ref 0.1–1)
BUN SERPL-MCNC: 24 MG/DL (ref 6–20)
CALCIUM SERPL-MCNC: 8.2 MG/DL (ref 8.7–10.5)
CHLORIDE SERPL-SCNC: 102 MMOL/L (ref 95–110)
CHOLEST SERPL-MCNC: 105 MG/DL (ref 120–199)
CHOLEST/HDLC SERPL: 4.6 {RATIO} (ref 2–5)
CO2 SERPL-SCNC: 26 MMOL/L (ref 23–29)
CREAT SERPL-MCNC: 1.8 MG/DL (ref 0.5–1.4)
DIFFERENTIAL METHOD BLD: ABNORMAL
EOSINOPHIL # BLD AUTO: 0.2 K/UL (ref 0–0.5)
EOSINOPHIL NFR BLD: 3 % (ref 0–8)
ERYTHROCYTE [DISTWIDTH] IN BLOOD BY AUTOMATED COUNT: 11.6 % (ref 11.5–14.5)
EST. GFR  (NO RACE VARIABLE): 44.2 ML/MIN/1.73 M^2
ESTIMATED AVG GLUCOSE: ABNORMAL MG/DL (ref 68–131)
GLUCOSE SERPL-MCNC: 273 MG/DL (ref 70–110)
HBA1C MFR BLD: >14 % (ref 4–5.6)
HCT VFR BLD AUTO: 35 % (ref 40–54)
HDLC SERPL-MCNC: 23 MG/DL (ref 40–75)
HDLC SERPL: 21.9 % (ref 20–50)
HGB BLD-MCNC: 10.4 G/DL (ref 14–18)
IMM GRANULOCYTES # BLD AUTO: 0.03 K/UL (ref 0–0.04)
IMM GRANULOCYTES NFR BLD AUTO: 0.5 % (ref 0–0.5)
LDLC SERPL CALC-MCNC: 67.2 MG/DL (ref 63–159)
LYMPHOCYTES # BLD AUTO: 1.9 K/UL (ref 1–4.8)
LYMPHOCYTES NFR BLD: 30.9 % (ref 18–48)
MCH RBC QN AUTO: 25.9 PG (ref 27–31)
MCHC RBC AUTO-ENTMCNC: 29.7 G/DL (ref 32–36)
MCV RBC AUTO: 87 FL (ref 82–98)
MONOCYTES # BLD AUTO: 0.4 K/UL (ref 0.3–1)
MONOCYTES NFR BLD: 6.7 % (ref 4–15)
NEUTROPHILS # BLD AUTO: 3.7 K/UL (ref 1.8–7.7)
NEUTROPHILS NFR BLD: 58.6 % (ref 38–73)
NONHDLC SERPL-MCNC: 82 MG/DL
NRBC BLD-RTO: 0 /100 WBC
PLATELET # BLD AUTO: 405 K/UL (ref 150–450)
PMV BLD AUTO: 8.5 FL (ref 9.2–12.9)
POTASSIUM SERPL-SCNC: 4.3 MMOL/L (ref 3.5–5.1)
PROT SERPL-MCNC: 6.6 G/DL (ref 6–8.4)
RBC # BLD AUTO: 4.01 M/UL (ref 4.6–6.2)
SODIUM SERPL-SCNC: 137 MMOL/L (ref 136–145)
TRIGL SERPL-MCNC: 74 MG/DL (ref 30–150)
TSH SERPL DL<=0.005 MIU/L-ACNC: 1.42 UIU/ML (ref 0.4–4)
WBC # BLD AUTO: 6.24 K/UL (ref 3.9–12.7)

## 2024-07-12 PROCEDURE — 82570 ASSAY OF URINE CREATININE: CPT | Performed by: FAMILY MEDICINE

## 2024-07-12 PROCEDURE — 83036 HEMOGLOBIN GLYCOSYLATED A1C: CPT | Performed by: FAMILY MEDICINE

## 2024-07-12 PROCEDURE — 36415 COLL VENOUS BLD VENIPUNCTURE: CPT | Mod: ,,, | Performed by: FAMILY MEDICINE

## 2024-07-12 PROCEDURE — 80053 COMPREHEN METABOLIC PANEL: CPT | Performed by: FAMILY MEDICINE

## 2024-07-12 PROCEDURE — 85025 COMPLETE CBC W/AUTO DIFF WBC: CPT | Performed by: FAMILY MEDICINE

## 2024-07-12 PROCEDURE — 80061 LIPID PANEL: CPT | Performed by: FAMILY MEDICINE

## 2024-07-12 PROCEDURE — 84443 ASSAY THYROID STIM HORMONE: CPT | Performed by: FAMILY MEDICINE

## 2024-07-12 RX ORDER — FLASH GLUCOSE SENSOR
KIT MISCELLANEOUS
Qty: 6 KIT | Refills: 6 | Status: SHIPPED | OUTPATIENT
Start: 2024-07-12

## 2024-07-12 NOTE — PATIENT INSTRUCTIONS
Diabetic Instructions from Dr. Demarco Benítez:    Do your best to reduce how much sugar and processed starches you eat (white rice, pasta, potatoes, chips, crackers, snacks, etc)    Start to count your carbs - (My Fitness Pal is great for this) - limit your carbs to 150grams of carbs per day    Avoid any food item with more than 10grams of sugar per serving    Good fruits to eat:  apples, berries, cherries, peaches, plums  Fruits to avoid:  bananas, citrus, grapes, tropical fruits, dried fruits    Most of your food should be vegetables, select fruits, lean proteins (seafood, chicken, turkey), and nuts, beans, eggs    Most of what you eat should be plants and protein only    Consider cutting out Vit C supplement    Start new medication -jardiance today  Get labs today  Goal sugar is 140

## 2024-07-12 NOTE — PROGRESS NOTES
"    [facility]  Primary Care Clinics - [place], [place]    Family Medicine Office Visit    Chief Complaint   Patient presents with    Follow-up        HPI:  followup chronic conditions:    Has DM, historically under quite poor control. Related to chronic LE wound which requires constant irrigation and debridement from surgery and wound care    Is recovering from COVID infection this summer    Blood Pressure at goal on medication, with patient reporting prescription compliance  Hyperlipidemia stable on appropriate intensity statin therapy      ROS: as above    Vitals:    07/12/24 0809   BP: 126/80   BP Location: Left arm   Patient Position: Sitting   BP Method: Large (Manual)   Pulse: 71   SpO2: 98%   Weight: 107.3 kg (236 lb 9.6 oz)   Height: 5' 11" (1.803 m)      Body mass index is 33 kg/m².      General:  AOx3, well nourished and developed in no acute distress  Eyes:  PERRLA, EOMI, vision intact grossly  ENT:  normal hearing, moist oral mucosa  Neck:  trachea midline with no masses or thyromegaly  Heart:  RRR, no murmurs.  No edema noted, extremities warm and well perfused  Lungs:  clear to auscultation bilaterally with symmetric chest movement  Abdomen:  Soft, nontender, nondistended.  Normal bowel sounds  Musculoskeletal:  Normal gait.  Normal posture.  Normal muscular development with no joint swelling.  Neurological:  CN II-XII grossly intact. Symmetric strength and sensation  Psych:  Normal mood and affect.  Able to demonstrate good judgement and personal insight.      Assessment/Plan:    1. Diabetic polyneuropathy associated with type 2 diabetes mellitus  -     CBC Auto Differential; Future; Expected date: 07/12/2024  -     Comprehensive Metabolic Panel; Future; Expected date: 07/12/2024  -     Hemoglobin A1C; Future; Expected date: 07/12/2024  -     Lipid Panel; Future; Expected date: 07/12/2024  -     TSH; Future; Expected date: 07/12/2024  -     Microalbumin/Creatinine Ratio, Urine; Future; " Expected date: 07/12/2024    2. Primary hypertension    3. Pure hypercholesterolemia    4. Diabetic infection of right foot    5. Type 2 diabetes mellitus with foot ulcer, with long-term current use of insulin  -     CBC Auto Differential; Future; Expected date: 07/12/2024  -     Comprehensive Metabolic Panel; Future; Expected date: 07/12/2024  -     Hemoglobin A1C; Future; Expected date: 07/12/2024  -     Lipid Panel; Future; Expected date: 07/12/2024  -     TSH; Future; Expected date: 07/12/2024  -     Microalbumin/Creatinine Ratio, Urine; Future; Expected date: 07/12/2024    Other orders  -     empagliflozin (JARDIANCE) 25 mg tablet; Take 1 tablet (25 mg total) by mouth once daily.  Dispense: 30 tablet; Refill: 6       Get comprehensive labs today.  Start jardiance.  Will tailor insulin to get goal A1C and sugar  Stable  Stable  Continue care  As above.  Start jardiance. Get labs    Visit today included increased complexity associated with the care of the episodic problem DM, wound addressed and managing the longitudinal care of the patient due to the serious and/or complex managed problem(s) DM, wound.

## 2024-07-13 LAB
ALBUMIN/CREAT UR: 2648.3 UG/MG (ref 0–30)
CREAT UR-MCNC: 174 MG/DL (ref 23–375)
MICROALBUMIN UR DL<=1MG/L-MCNC: 4608 UG/ML

## 2024-07-15 ENCOUNTER — PATIENT MESSAGE (OUTPATIENT)
Dept: FAMILY MEDICINE | Facility: CLINIC | Age: 54
End: 2024-07-15
Payer: COMMERCIAL

## 2024-07-16 ENCOUNTER — TELEPHONE (OUTPATIENT)
Dept: FAMILY MEDICINE | Facility: CLINIC | Age: 54
End: 2024-07-16
Payer: COMMERCIAL

## 2024-07-16 NOTE — TELEPHONE ENCOUNTER
Nestor Benítez,  Please review message below concerning this patient's Rx's and PA approval for Jardiance 25 mg-PA Approved.  Freestyle Harlan 14 Day Sensor PA-Currently pending.  Signed:  Demond Marie LPN      ----- Message from Elena Mcelroy sent at 7/16/2024  3:35 PM CDT -----  Contact: PT  Type: Needs Medical Advice    Who Called: PT  Best Call Back Number:  [phone]  Additional  Information: PT requesting a call back to know when jardiance, &  free style harlan glucose sensors ,monitor will be called in to pharmacy. States he has been calling since yesterday, have not received a response back.  Please Advise- Thank you

## 2024-07-20 DIAGNOSIS — Z79.4 TYPE 2 DIABETES MELLITUS WITH FOOT ULCER, WITH LONG-TERM CURRENT USE OF INSULIN: ICD-10-CM

## 2024-07-20 DIAGNOSIS — L97.509 TYPE 2 DIABETES MELLITUS WITH FOOT ULCER, WITH LONG-TERM CURRENT USE OF INSULIN: ICD-10-CM

## 2024-07-20 DIAGNOSIS — E11.621 TYPE 2 DIABETES MELLITUS WITH FOOT ULCER, WITH LONG-TERM CURRENT USE OF INSULIN: ICD-10-CM

## 2024-07-20 NOTE — TELEPHONE ENCOUNTER
No care due was identified.  [facility] Embedded Care Due Messages. Reference number: [id number].   7/20/2024 7:45:20 AM CDT

## 2024-07-20 NOTE — TELEPHONE ENCOUNTER
Refill Routing Note   Medication(s) are not appropriate for processing by [facility] for the following reason(s):      No active prescription written by provider    [facility] action(s):  Defer Care Due:  None identified            Appointments  past 12m or future 3m with PCP    Date Provider   Last Visit   7/12/2024 Demarco Benítez MD   Next Visit   8/23/2024 Demarco Benítez MD   ED visits in past 90 days: 0        Note composed:12:16 PM 07/20/2024

## 2024-07-21 RX ORDER — ROSUVASTATIN CALCIUM 5 MG/1
TABLET, COATED ORAL
Qty: 90 TABLET | Refills: 3 | Status: SHIPPED | OUTPATIENT
Start: 2024-07-21

## 2024-07-26 ENCOUNTER — TELEPHONE (OUTPATIENT)
Dept: FAMILY MEDICINE | Facility: CLINIC | Age: 54
End: 2024-07-26
Payer: COMMERCIAL

## 2024-07-26 NOTE — TELEPHONE ENCOUNTER
----- Message from Jessica Rhodes sent at 7/26/2024  1:15 PM CDT -----  Contact: Patient  Type:  Needs Medical Advice    Who Called: Patient     Pharmacy name and phone #:    [id number] - [place], [place] - [address]  [address]  [facility] [place] [address]  Phone: [phone] Fax: [phone]    Would the patient rather a call back or a response via [facility]? Call    Best Call Back Number: [phone] (home)     Additional Information: flash glucose sensor (FREESTYLE CJ 2 SENSOR) Kit    Patient states that the wrong sensor was called in for him. Please call in a script for the sensor above. Please advise

## 2024-07-27 ENCOUNTER — PATIENT MESSAGE (OUTPATIENT)
Dept: ADMINISTRATIVE | Facility: HOSPITAL | Age: 54
End: 2024-07-27
Payer: COMMERCIAL

## 2024-07-28 NOTE — TELEPHONE ENCOUNTER
No care due was identified.  [facility] Embedded Care Due Messages. Reference number: [id number].   7/27/2024 10:29:43 PM CDT

## 2024-07-29 RX ORDER — LISINOPRIL 5 MG/1
5 TABLET ORAL DAILY
Qty: 90 TABLET | Refills: 3 | Status: SHIPPED | OUTPATIENT
Start: 2024-07-29

## 2024-07-29 RX ORDER — FLASH GLUCOSE SENSOR
KIT MISCELLANEOUS
Qty: 2 KIT | Refills: 5 | Status: SHIPPED | OUTPATIENT
Start: 2024-07-29

## 2024-07-29 NOTE — TELEPHONE ENCOUNTER
No care due was identified.  [facility] Embedded Care Due Messages. Reference number: [id number].   7/29/2024 8:18:43 AM CDT

## 2024-08-06 RX ORDER — INSULIN DEGLUDEC 100 U/ML
8 INJECTION, SOLUTION SUBCUTANEOUS DAILY
Qty: 3 ML | Refills: 11 | Status: SHIPPED | OUTPATIENT
Start: 2024-08-06

## 2024-08-23 ENCOUNTER — OFFICE VISIT (OUTPATIENT)
Dept: FAMILY MEDICINE | Facility: CLINIC | Age: 54
End: 2024-08-23
Payer: COMMERCIAL

## 2024-08-23 VITALS
DIASTOLIC BLOOD PRESSURE: 88 MMHG | OXYGEN SATURATION: 98 % | HEIGHT: 71 IN | WEIGHT: 246.81 LBS | SYSTOLIC BLOOD PRESSURE: 138 MMHG | HEART RATE: 73 BPM | BODY MASS INDEX: 34.55 KG/M2

## 2024-08-23 DIAGNOSIS — L97.519 FOOT ULCERATION, RIGHT, WITH UNSPECIFIED SEVERITY: Primary | ICD-10-CM

## 2024-08-23 DIAGNOSIS — L97.509 TYPE 2 DIABETES MELLITUS WITH FOOT ULCER, WITH LONG-TERM CURRENT USE OF INSULIN: ICD-10-CM

## 2024-08-23 DIAGNOSIS — E78.00 PURE HYPERCHOLESTEROLEMIA: ICD-10-CM

## 2024-08-23 DIAGNOSIS — E11.621 TYPE 2 DIABETES MELLITUS WITH FOOT ULCER, WITH LONG-TERM CURRENT USE OF INSULIN: ICD-10-CM

## 2024-08-23 DIAGNOSIS — I10 PRIMARY HYPERTENSION: ICD-10-CM

## 2024-08-23 DIAGNOSIS — E11.42 DIABETIC POLYNEUROPATHY ASSOCIATED WITH TYPE 2 DIABETES MELLITUS: ICD-10-CM

## 2024-08-23 DIAGNOSIS — Z79.4 TYPE 2 DIABETES MELLITUS WITH FOOT ULCER, WITH LONG-TERM CURRENT USE OF INSULIN: ICD-10-CM

## 2024-08-23 RX ORDER — GABAPENTIN 300 MG/1
300 CAPSULE ORAL 2 TIMES DAILY
Qty: 60 CAPSULE | Refills: 11 | Status: SHIPPED | OUTPATIENT
Start: 2024-08-23 | End: 2025-08-23

## 2024-08-23 RX ORDER — LEVOFLOXACIN 500 MG/1
500 TABLET, FILM COATED ORAL
COMMUNITY
Start: 2024-08-20

## 2024-08-23 RX ORDER — FUROSEMIDE 20 MG/1
20 TABLET ORAL DAILY PRN
Qty: 30 TABLET | Refills: 11 | Status: SHIPPED | OUTPATIENT
Start: 2024-08-23 | End: 2025-08-23

## 2024-08-23 NOTE — PROGRESS NOTES
"  [facility]  Primary Care Clinics - [place], [place]    Family Medicine Office Visit    Chief Complaint   Patient presents with    Follow-up     6 week f/u         HPI:  Here today for followup:    Reports he had been doing extremely well controlling sugar, but has had to have recurrent surgery to foot for ulcer/semi-chronic osteo.    Has just been placed on levaquin.  Had debridement on 7/29.  Sugars have been elevated over past week.  Was consistently in 140s just on Aug 16.    Blood Pressure at goal on medication, with patient reporting prescription compliance  Hyperlipidemia stable on appropriate intensity statin therapy      ROS: as above    Vitals:    08/23/24 0851 08/23/24 0903   BP: (!) 140/92 138/88   BP Location: Right arm    Patient Position: Sitting    BP Method: Large (Manual)    Pulse: 73    SpO2: 98%    Weight: 111.9 kg (246 lb 12.8 oz)    Height: 5' 11" (1.803 m)       Body mass index is 34.42 kg/m².      General:  AOx3, well nourished and developed in no acute distress  Eyes:  PERRLA, EOMI, vision intact grossly  ENT:  normal hearing, moist oral mucosa  Neck:  trachea midline with no masses or thyromegaly  Heart:  RRR, no murmurs.  No edema noted, extremities warm and well perfused  Lungs:  clear to auscultation bilaterally with symmetric chest movement  Abdomen:  Soft, nontender, nondistended.  Normal bowel sounds  Musculoskeletal:  Normal gait.  Normal posture.  Normal muscular development with no joint swelling.  Neurological:  CN II-XII grossly intact. Symmetric strength and sensation  Psych:  Normal mood and affect.  Able to demonstrate good judgement and personal insight.  R foot bandaged given chronic wound to foot    Assessment/Plan:    1. Foot ulceration, right, with unspecified severity    2. Type 2 diabetes mellitus with foot ulcer, with long-term current use of insulin    3. Pure hypercholesterolemia    4. Primary hypertension    5. Diabetic polyneuropathy associated with type 2 " diabetes mellitus         Healing, continue abx and wound care  Resume tresiba to bring sugar down in setting of recurrent infection  Stable  At goal  Resume gabapentin.  Use lasix prn    Visit today included increased complexity associated with the care of the episodic problem dm, chronic wound addressed and managing the longitudinal care of the patient due to the serious and/or complex managed problem(s) dm, chronic wound.

## 2024-08-23 NOTE — PATIENT INSTRUCTIONS
Resume tresiba (insulin) 6 units daily until antibiotics done    Will send gabapentin to pharmacy    Start lasix (fluid pill) as you need for swelling    Follow up 2 months

## 2024-09-10 ENCOUNTER — PATIENT MESSAGE (OUTPATIENT)
Dept: ADMINISTRATIVE | Facility: HOSPITAL | Age: 54
End: 2024-09-10
Payer: COMMERCIAL

## 2024-09-25 DIAGNOSIS — L97.509 TYPE 2 DIABETES MELLITUS WITH FOOT ULCER, WITHOUT LONG-TERM CURRENT USE OF INSULIN: ICD-10-CM

## 2024-09-25 DIAGNOSIS — E11.621 TYPE 2 DIABETES MELLITUS WITH FOOT ULCER, WITHOUT LONG-TERM CURRENT USE OF INSULIN: ICD-10-CM

## 2024-09-25 RX ORDER — PEN NEEDLE, DIABETIC 32GX 5/32"
NEEDLE, DISPOSABLE MISCELLANEOUS
Qty: 500 EACH | Refills: 3 | Status: SHIPPED | OUTPATIENT
Start: 2024-09-25

## 2024-09-25 NOTE — TELEPHONE ENCOUNTER
Refill Decision Note   Garret Munguia  is requesting a refill authorization.  Brief Assessment and Rationale for Refill:  Approve     Medication Therapy Plan:        Comments:     Note composed:9:34 AM 09/25/2024

## 2024-09-25 NOTE — TELEPHONE ENCOUNTER
No care due was identified.  [facility] Embedded Care Due Messages. Reference number: [id number].   9/25/2024 7:06:14 AM CDT

## 2024-10-09 ENCOUNTER — PATIENT MESSAGE (OUTPATIENT)
Dept: ADMINISTRATIVE | Facility: HOSPITAL | Age: 54
End: 2024-10-09
Payer: COMMERCIAL

## 2024-10-10 ENCOUNTER — PATIENT MESSAGE (OUTPATIENT)
Dept: ADMINISTRATIVE | Facility: HOSPITAL | Age: 54
End: 2024-10-10
Payer: COMMERCIAL

## 2024-10-15 ENCOUNTER — PATIENT MESSAGE (OUTPATIENT)
Dept: FAMILY MEDICINE | Facility: CLINIC | Age: 54
End: 2024-10-15
Payer: COMMERCIAL

## 2024-10-24 ENCOUNTER — TELEPHONE (OUTPATIENT)
Dept: FAMILY MEDICINE | Facility: CLINIC | Age: 54
End: 2024-10-24

## 2024-10-24 ENCOUNTER — OFFICE VISIT (OUTPATIENT)
Dept: FAMILY MEDICINE | Facility: CLINIC | Age: 54
End: 2024-10-24
Payer: COMMERCIAL

## 2024-10-24 VITALS
BODY MASS INDEX: 36.31 KG/M2 | HEIGHT: 71 IN | HEART RATE: 66 BPM | DIASTOLIC BLOOD PRESSURE: 78 MMHG | WEIGHT: 259.38 LBS | SYSTOLIC BLOOD PRESSURE: 138 MMHG | OXYGEN SATURATION: 98 %

## 2024-10-24 DIAGNOSIS — E11.42 DIABETIC POLYNEUROPATHY ASSOCIATED WITH TYPE 2 DIABETES MELLITUS: Primary | ICD-10-CM

## 2024-10-24 DIAGNOSIS — Z79.4 TYPE 2 DIABETES MELLITUS WITH FOOT ULCER, WITH LONG-TERM CURRENT USE OF INSULIN: ICD-10-CM

## 2024-10-24 DIAGNOSIS — E11.628 DIABETIC INFECTION OF RIGHT FOOT: ICD-10-CM

## 2024-10-24 DIAGNOSIS — E11.621 TYPE 2 DIABETES MELLITUS WITH FOOT ULCER, WITH LONG-TERM CURRENT USE OF INSULIN: ICD-10-CM

## 2024-10-24 DIAGNOSIS — L97.509 TYPE 2 DIABETES MELLITUS WITH FOOT ULCER, WITH LONG-TERM CURRENT USE OF INSULIN: ICD-10-CM

## 2024-10-24 DIAGNOSIS — I10 PRIMARY HYPERTENSION: ICD-10-CM

## 2024-10-24 DIAGNOSIS — L08.9 DIABETIC INFECTION OF RIGHT FOOT: ICD-10-CM

## 2024-10-24 DIAGNOSIS — E78.00 PURE HYPERCHOLESTEROLEMIA: ICD-10-CM

## 2024-10-24 PROCEDURE — 3066F NEPHROPATHY DOC TX: CPT | Mod: S$GLB,,, | Performed by: FAMILY MEDICINE

## 2024-10-24 PROCEDURE — 3075F SYST BP GE 130 - 139MM HG: CPT | Mod: S$GLB,,, | Performed by: FAMILY MEDICINE

## 2024-10-24 PROCEDURE — 3078F DIAST BP <80 MM HG: CPT | Mod: S$GLB,,, | Performed by: FAMILY MEDICINE

## 2024-10-24 PROCEDURE — 4010F ACE/ARB THERAPY RXD/TAKEN: CPT | Mod: S$GLB,,, | Performed by: FAMILY MEDICINE

## 2024-10-24 PROCEDURE — 1159F MED LIST DOCD IN RCRD: CPT | Mod: S$GLB,,, | Performed by: FAMILY MEDICINE

## 2024-10-24 PROCEDURE — 3062F POS MACROALBUMINURIA REV: CPT | Mod: S$GLB,,, | Performed by: FAMILY MEDICINE

## 2024-10-24 PROCEDURE — 3046F HEMOGLOBIN A1C LEVEL >9.0%: CPT | Mod: S$GLB,,, | Performed by: FAMILY MEDICINE

## 2024-10-24 PROCEDURE — 3008F BODY MASS INDEX DOCD: CPT | Mod: S$GLB,,, | Performed by: FAMILY MEDICINE

## 2024-10-24 PROCEDURE — G2211 COMPLEX E/M VISIT ADD ON: HCPCS | Mod: S$GLB,,, | Performed by: FAMILY MEDICINE

## 2024-10-24 PROCEDURE — 99214 OFFICE O/P EST MOD 30 MIN: CPT | Mod: S$GLB,,, | Performed by: FAMILY MEDICINE

## 2024-10-24 RX ORDER — FUROSEMIDE 20 MG/1
20 TABLET ORAL DAILY PRN
Qty: 30 TABLET | Refills: 11 | Status: SHIPPED | OUTPATIENT
Start: 2024-10-24 | End: 2025-10-24

## 2024-10-24 RX ORDER — INSULIN DEGLUDEC 100 U/ML
5 INJECTION, SOLUTION SUBCUTANEOUS 2 TIMES DAILY
Start: 2024-10-24

## 2024-10-24 NOTE — PATIENT INSTRUCTIONS
Increase tresiba to 5 units twice a day  Get A1C to see if sugar is coming down    Follow up 3 months    Take lasix as needed for swelling

## 2024-10-24 NOTE — TELEPHONE ENCOUNTER
MA aware.      ----- Message from Lindsay sent at 10/24/2024  8:54 AM CDT -----  Regarding: Running late  Type:  Needs Medical Advice    Who Called: Pt    Would the patient rather a call back or a response via [facility]? Call back    Best Call Back Number: [phone]    Additional Information: Pt have a 9:15 am appt today but will be a little late. Thank you

## 2024-10-24 NOTE — PROGRESS NOTES
"    [facility]  Primary Care Clin[facility] - [place], [place]    Family Medicine Office Visit    Chief Complaint   Patient presents with    Follow-up     2 month f/u         HPI:  followup for poorly controlled type 2 DM directly related to chronic osteo of foot    Continues to see Calixto Russell for chronic foot wound    Had notable bump in sugar over the past week - but food looks to be ok.  Needs repeat A1C    ROS: as above    Vitals:    10/24/24 0930 10/24/24 0944   BP: (!) 145/78 138/78   BP Location: Right arm    Patient Position: Sitting    Pulse: 66    SpO2: 98%    Weight: 117.7 kg (259 lb 6.4 oz)    Height: 5' 11" (1.803 m)       Body mass index is 36.18 kg/m².      General:  AOx3, well nourished and developed in no acute distress  Eyes:  PERRLA, EOMI, vision intact grossly  ENT:  normal hearing, moist oral mucosa  Neck:  trachea midline with no masses or thyromegaly  Heart:  RRR, no murmurs.  No edema noted, extremities warm and well perfused  Lungs:  clear to auscultation bilaterally with symmetric chest movement  Abdomen:  Soft, nontender, nondistended.  Normal bowel sounds  Musculoskeletal:  Normal gait.  Normal posture.  Normal muscular development with no joint swelling.  Walking boot to R foot  Neurological:  CN II-XII grossly intact. Symmetric strength and sensation  Psych:  Normal mood and affect.  Able to demonstrate good judgement and personal insight.      Assessment/Plan:    1. Diabetic polyneuropathy associated with type 2 diabetes mellitus  -     Hemoglobin A1C; Future; Expected date: 10/24/2024    2. Primary hypertension    3. Pure hypercholesterolemia    4. Type 2 diabetes mellitus with foot ulcer, with long-term current use of insulin  -     Hemoglobin A1C; Future; Expected date: 10/24/2024    5. Diabetic infection of right foot  -     Hemoglobin A1C; Future; Expected date: 10/24/2024    Other orders  -     insulin degludec (TRESIBA FLEXTOUCH U-100) 100 unit/mL (3 mL) insulin pen; Inject " 5 Units into the skin 2 (two) times daily.       Stable  At goal  Stable on statin  Increase tresiba to 5 BID  As above, continue wound     Visit today included increased complexity associated with the care of the episodic problem htn, hld, dm addressed and managing the longitudinal care of the patient due to the serious and/or complex managed problem(s) htn, hld,dm.

## 2024-11-06 ENCOUNTER — PATIENT MESSAGE (OUTPATIENT)
Dept: FAMILY MEDICINE | Facility: CLINIC | Age: 54
End: 2024-11-06
Payer: COMMERCIAL

## 2024-11-26 ENCOUNTER — TELEPHONE (OUTPATIENT)
Dept: FAMILY MEDICINE | Facility: CLINIC | Age: 54
End: 2024-11-26
Payer: COMMERCIAL

## 2024-12-09 ENCOUNTER — OFFICE VISIT (OUTPATIENT)
Dept: FAMILY MEDICINE | Facility: CLINIC | Age: 54
End: 2024-12-09
Payer: COMMERCIAL

## 2024-12-09 ENCOUNTER — LAB VISIT (OUTPATIENT)
Dept: LAB | Facility: CLINIC | Age: 54
End: 2024-12-09
Payer: COMMERCIAL

## 2024-12-09 VITALS
OXYGEN SATURATION: 98 % | HEART RATE: 70 BPM | HEIGHT: 71 IN | WEIGHT: 257.13 LBS | BODY MASS INDEX: 36 KG/M2 | DIASTOLIC BLOOD PRESSURE: 80 MMHG | SYSTOLIC BLOOD PRESSURE: 138 MMHG

## 2024-12-09 DIAGNOSIS — L97.519 FOOT ULCERATION, RIGHT, WITH UNSPECIFIED SEVERITY: ICD-10-CM

## 2024-12-09 DIAGNOSIS — E11.621 TYPE 2 DIABETES MELLITUS WITH FOOT ULCER, WITH LONG-TERM CURRENT USE OF INSULIN: ICD-10-CM

## 2024-12-09 DIAGNOSIS — Z79.4 TYPE 2 DIABETES MELLITUS WITH FOOT ULCER, WITH LONG-TERM CURRENT USE OF INSULIN: ICD-10-CM

## 2024-12-09 DIAGNOSIS — E11.621 TYPE 2 DIABETES MELLITUS WITH FOOT ULCER, WITH LONG-TERM CURRENT USE OF INSULIN: Primary | ICD-10-CM

## 2024-12-09 DIAGNOSIS — Z79.4 TYPE 2 DIABETES MELLITUS WITH FOOT ULCER, WITH LONG-TERM CURRENT USE OF INSULIN: Primary | ICD-10-CM

## 2024-12-09 DIAGNOSIS — L97.509 TYPE 2 DIABETES MELLITUS WITH FOOT ULCER, WITH LONG-TERM CURRENT USE OF INSULIN: Primary | ICD-10-CM

## 2024-12-09 DIAGNOSIS — I10 PRIMARY HYPERTENSION: ICD-10-CM

## 2024-12-09 DIAGNOSIS — L97.509 TYPE 2 DIABETES MELLITUS WITH FOOT ULCER, WITH LONG-TERM CURRENT USE OF INSULIN: ICD-10-CM

## 2024-12-09 DIAGNOSIS — E11.42 DIABETIC POLYNEUROPATHY ASSOCIATED WITH TYPE 2 DIABETES MELLITUS: ICD-10-CM

## 2024-12-09 DIAGNOSIS — E78.00 PURE HYPERCHOLESTEROLEMIA: ICD-10-CM

## 2024-12-09 PROCEDURE — 3062F POS MACROALBUMINURIA REV: CPT | Mod: S$GLB,,, | Performed by: FAMILY MEDICINE

## 2024-12-09 PROCEDURE — 3066F NEPHROPATHY DOC TX: CPT | Mod: S$GLB,,, | Performed by: FAMILY MEDICINE

## 2024-12-09 PROCEDURE — 3046F HEMOGLOBIN A1C LEVEL >9.0%: CPT | Mod: S$GLB,,, | Performed by: FAMILY MEDICINE

## 2024-12-09 PROCEDURE — 3075F SYST BP GE 130 - 139MM HG: CPT | Mod: S$GLB,,, | Performed by: FAMILY MEDICINE

## 2024-12-09 PROCEDURE — 80053 COMPREHEN METABOLIC PANEL: CPT | Performed by: FAMILY MEDICINE

## 2024-12-09 PROCEDURE — 3079F DIAST BP 80-89 MM HG: CPT | Mod: S$GLB,,, | Performed by: FAMILY MEDICINE

## 2024-12-09 PROCEDURE — 99214 OFFICE O/P EST MOD 30 MIN: CPT | Mod: S$GLB,,, | Performed by: FAMILY MEDICINE

## 2024-12-09 PROCEDURE — 1159F MED LIST DOCD IN RCRD: CPT | Mod: S$GLB,,, | Performed by: FAMILY MEDICINE

## 2024-12-09 PROCEDURE — 83036 HEMOGLOBIN GLYCOSYLATED A1C: CPT | Performed by: FAMILY MEDICINE

## 2024-12-09 PROCEDURE — 3008F BODY MASS INDEX DOCD: CPT | Mod: S$GLB,,, | Performed by: FAMILY MEDICINE

## 2024-12-09 PROCEDURE — 4010F ACE/ARB THERAPY RXD/TAKEN: CPT | Mod: S$GLB,,, | Performed by: FAMILY MEDICINE

## 2024-12-09 PROCEDURE — G2211 COMPLEX E/M VISIT ADD ON: HCPCS | Mod: S$GLB,,, | Performed by: FAMILY MEDICINE

## 2024-12-09 PROCEDURE — 85025 COMPLETE CBC W/AUTO DIFF WBC: CPT | Performed by: FAMILY MEDICINE

## 2024-12-09 RX ORDER — BLOOD-GLUCOSE,RECEIVER,CONT
EACH MISCELLANEOUS
Qty: 1 EACH | Refills: 0 | Status: SHIPPED | OUTPATIENT
Start: 2024-12-09

## 2024-12-09 RX ORDER — LOSARTAN POTASSIUM 50 MG/1
50 TABLET ORAL
COMMUNITY
Start: 2024-12-03

## 2024-12-09 RX ORDER — BLOOD-GLUCOSE SENSOR
EACH MISCELLANEOUS
Qty: 5 EACH | Refills: 6 | Status: SHIPPED | OUTPATIENT
Start: 2024-12-09

## 2024-12-09 NOTE — PROGRESS NOTES
"    [facility]  Primary Care Clinics - [place], [place]    Family Medicine Office Visit    Chief Complaint   Patient presents with    Follow-up     F/u on A1C        HPI:  needs diabetic followup.      Poorly controlled type 2 has lead to chronic foot wound - reports good healing with wound care    Blood Pressure at goal on medication, with patient reporting prescription compliance  Hyperlipidemia stable on appropriate intensity statin therapy    Didn't tolerate metformin in past    Has mixed bag of tiffanie sensors - so needs mixed sensors and receivers    ROS: as above    Vitals:    12/09/24 1526 12/09/24 1533   BP: (!) 140/80 138/80   BP Location: Left arm    Patient Position: Sitting    Pulse: 70    SpO2: 98%    Weight: 116.6 kg (257 lb 1.6 oz)    Height: 5' 11" (1.803 m)       Body mass index is 35.86 kg/m².      General:  AOx3, well nourished and developed in no acute distress  Eyes:  PERRLA, EOMI, vision intact grossly  ENT:  normal hearing, moist oral mucosa  Neck:  trachea midline with no masses or thyromegaly  Heart:  RRR, no murmurs.  No edema noted, extremities warm and well perfused  Lungs:  clear to auscultation bilaterally with symmetric chest movement  Abdomen:  Soft, nontender, nondistended.  Normal bowel sounds  Musculoskeletal:  Normal gait.  Normal posture.  Normal muscular development with no joint swelling.  R foot in boot  Neurological:  CN II-XII grossly intact. Symmetric strength and sensation  Psych:  Normal mood and affect.  Able to demonstrate good judgement and personal insight.      Assessment/Plan:    1. Type 2 diabetes mellitus with foot ulcer, with long-term current use of insulin    2. Foot ulceration, right, with unspecified severity    3. Primary hypertension    4. Pure hypercholesterolemia    5. Diabetic polyneuropathy associated with type 2 diabetes mellitus         Check A1C and get better glucose monitoring  As above,needs better glycemic control  At goal  Stable  As " above    Visit today included increased complexity associated with the care of the episodic problem htn, hld, dm addressed and managing the longitudinal care of the patient due to the serious and/or complex managed problem(s) htn, hld, dm.    May consider glp-1

## 2024-12-09 NOTE — PATIENT INSTRUCTIONS
Get labs  Filled diabetic sensors    May consider wegovy or ozempic moving forward to help jardiance work better

## 2024-12-10 LAB
ALBUMIN SERPL BCP-MCNC: 2.6 G/DL (ref 3.5–5.2)
ALP SERPL-CCNC: 107 U/L (ref 40–150)
ALT SERPL W/O P-5'-P-CCNC: 13 U/L (ref 10–44)
ANION GAP SERPL CALC-SCNC: 7 MMOL/L (ref 8–16)
AST SERPL-CCNC: 18 U/L (ref 10–40)
BASOPHILS # BLD AUTO: 0.03 K/UL (ref 0–0.2)
BASOPHILS NFR BLD: 0.6 % (ref 0–1.9)
BILIRUB SERPL-MCNC: 0.2 MG/DL (ref 0.1–1)
BUN SERPL-MCNC: 28 MG/DL (ref 6–20)
CALCIUM SERPL-MCNC: 8.2 MG/DL (ref 8.7–10.5)
CHLORIDE SERPL-SCNC: 103 MMOL/L (ref 95–110)
CO2 SERPL-SCNC: 27 MMOL/L (ref 23–29)
CREAT SERPL-MCNC: 2.5 MG/DL (ref 0.5–1.4)
DIFFERENTIAL METHOD BLD: ABNORMAL
EOSINOPHIL # BLD AUTO: 0.2 K/UL (ref 0–0.5)
EOSINOPHIL NFR BLD: 3.2 % (ref 0–8)
ERYTHROCYTE [DISTWIDTH] IN BLOOD BY AUTOMATED COUNT: 12.4 % (ref 11.5–14.5)
EST. GFR  (NO RACE VARIABLE): 29.8 ML/MIN/1.73 M^2
ESTIMATED AVG GLUCOSE: 292 MG/DL (ref 68–131)
GLUCOSE SERPL-MCNC: 353 MG/DL (ref 70–110)
HBA1C MFR BLD: 11.8 % (ref 4–5.6)
HCT VFR BLD AUTO: 40.7 % (ref 40–54)
HGB BLD-MCNC: 12.1 G/DL (ref 14–18)
IMM GRANULOCYTES # BLD AUTO: 0.08 K/UL (ref 0–0.04)
IMM GRANULOCYTES NFR BLD AUTO: 1.6 % (ref 0–0.5)
LYMPHOCYTES # BLD AUTO: 1.6 K/UL (ref 1–4.8)
LYMPHOCYTES NFR BLD: 32.3 % (ref 18–48)
MCH RBC QN AUTO: 25.7 PG (ref 27–31)
MCHC RBC AUTO-ENTMCNC: 29.7 G/DL (ref 32–36)
MCV RBC AUTO: 87 FL (ref 82–98)
MONOCYTES # BLD AUTO: 0.4 K/UL (ref 0.3–1)
MONOCYTES NFR BLD: 8.3 % (ref 4–15)
NEUTROPHILS # BLD AUTO: 2.7 K/UL (ref 1.8–7.7)
NEUTROPHILS NFR BLD: 54 % (ref 38–73)
NRBC BLD-RTO: 0 /100 WBC
PLATELET # BLD AUTO: 316 K/UL (ref 150–450)
PMV BLD AUTO: 9.2 FL (ref 9.2–12.9)
POTASSIUM SERPL-SCNC: 4.5 MMOL/L (ref 3.5–5.1)
PROT SERPL-MCNC: 6.3 G/DL (ref 6–8.4)
RBC # BLD AUTO: 4.7 M/UL (ref 4.6–6.2)
SODIUM SERPL-SCNC: 137 MMOL/L (ref 136–145)
WBC # BLD AUTO: 4.93 K/UL (ref 3.9–12.7)

## 2024-12-12 ENCOUNTER — OFFICE VISIT (OUTPATIENT)
Dept: FAMILY MEDICINE | Facility: CLINIC | Age: 54
End: 2024-12-12
Payer: COMMERCIAL

## 2024-12-12 VITALS
WEIGHT: 258.13 LBS | DIASTOLIC BLOOD PRESSURE: 82 MMHG | BODY MASS INDEX: 36.14 KG/M2 | OXYGEN SATURATION: 99 % | HEIGHT: 71 IN | SYSTOLIC BLOOD PRESSURE: 138 MMHG | HEART RATE: 68 BPM

## 2024-12-12 DIAGNOSIS — L08.9 DIABETIC INFECTION OF RIGHT FOOT: ICD-10-CM

## 2024-12-12 DIAGNOSIS — E78.00 PURE HYPERCHOLESTEROLEMIA: ICD-10-CM

## 2024-12-12 DIAGNOSIS — N17.9 AKI (ACUTE KIDNEY INJURY): ICD-10-CM

## 2024-12-12 DIAGNOSIS — I10 PRIMARY HYPERTENSION: Primary | ICD-10-CM

## 2024-12-12 DIAGNOSIS — E11.628 DIABETIC INFECTION OF RIGHT FOOT: ICD-10-CM

## 2024-12-12 DIAGNOSIS — L97.519 FOOT ULCERATION, RIGHT, WITH UNSPECIFIED SEVERITY: ICD-10-CM

## 2024-12-12 DIAGNOSIS — E11.42 DIABETIC POLYNEUROPATHY ASSOCIATED WITH TYPE 2 DIABETES MELLITUS: ICD-10-CM

## 2024-12-12 DIAGNOSIS — E11.621 TYPE 2 DIABETES MELLITUS WITH FOOT ULCER, WITH LONG-TERM CURRENT USE OF INSULIN: ICD-10-CM

## 2024-12-12 DIAGNOSIS — Z79.4 TYPE 2 DIABETES MELLITUS WITH FOOT ULCER, WITH LONG-TERM CURRENT USE OF INSULIN: ICD-10-CM

## 2024-12-12 DIAGNOSIS — L97.509 TYPE 2 DIABETES MELLITUS WITH FOOT ULCER, WITH LONG-TERM CURRENT USE OF INSULIN: ICD-10-CM

## 2024-12-12 PROCEDURE — 3075F SYST BP GE 130 - 139MM HG: CPT | Mod: S$GLB,,, | Performed by: FAMILY MEDICINE

## 2024-12-12 PROCEDURE — 99214 OFFICE O/P EST MOD 30 MIN: CPT | Mod: S$GLB,,, | Performed by: FAMILY MEDICINE

## 2024-12-12 PROCEDURE — 4010F ACE/ARB THERAPY RXD/TAKEN: CPT | Mod: S$GLB,,, | Performed by: FAMILY MEDICINE

## 2024-12-12 PROCEDURE — 3066F NEPHROPATHY DOC TX: CPT | Mod: S$GLB,,, | Performed by: FAMILY MEDICINE

## 2024-12-12 PROCEDURE — G2211 COMPLEX E/M VISIT ADD ON: HCPCS | Mod: S$GLB,,, | Performed by: FAMILY MEDICINE

## 2024-12-12 PROCEDURE — 3062F POS MACROALBUMINURIA REV: CPT | Mod: S$GLB,,, | Performed by: FAMILY MEDICINE

## 2024-12-12 PROCEDURE — 3008F BODY MASS INDEX DOCD: CPT | Mod: S$GLB,,, | Performed by: FAMILY MEDICINE

## 2024-12-12 PROCEDURE — 3079F DIAST BP 80-89 MM HG: CPT | Mod: S$GLB,,, | Performed by: FAMILY MEDICINE

## 2024-12-12 PROCEDURE — 3046F HEMOGLOBIN A1C LEVEL >9.0%: CPT | Mod: S$GLB,,, | Performed by: FAMILY MEDICINE

## 2024-12-12 PROCEDURE — 1159F MED LIST DOCD IN RCRD: CPT | Mod: S$GLB,,, | Performed by: FAMILY MEDICINE

## 2024-12-12 NOTE — PROGRESS NOTES
"    [facility]  [facility] Care [facility] - [place], [place]    Family Medicine Office Visit    Chief Complaint   Patient presents with    Follow-up        HPI:  followup:  has made improvement in sugar, but not near enough what we need to improve wound healing.    Seeing podiatry Dr. Isabel at [facility].  It is likely that current antibiotic regimen is now causing BUDDY    Blood Pressure at goal on medication, with patient reporting prescription compliance  Hyperlipidemia stable on appropriate intensity statin therapy    Augmentin and bactrim - current abx regimen.    ROS: as above    Vitals:    12/12/24 0834   BP: 138/82   BP Location: Left arm   Patient Position: Sitting   Pulse: 68   SpO2: 99%   Weight: 117.1 kg (258 lb 1.6 oz)   Height: 5' 11" (1.803 m)      Body mass index is 36 kg/m².      General:  AOx3, well nourished and developed in no acute distress  Eyes:  PERRLA, EOMI, vision intact grossly  ENT:  normal hearing, moist oral mucosa  Neck:  trachea midline with no masses or thyromegaly  Heart:  RRR, no murmurs.  No edema noted, extremities warm and well perfused  Lungs:  clear to auscultation bilaterally with symmetric chest movement  Abdomen:  Soft, nontender, nondistended.  Normal bowel sounds  Musculoskeletal:  Normal gait.  Normal posture.  Normal muscular development with no joint swelling.  Neurological:  CN II-XII grossly intact. Symmetric strength and sensation  Psych:  Normal mood and affect.  Able to demonstrate good judgement and personal insight.  R foot in boot    Assessment/Plan:    1. Primary hypertension    2. Pure hypercholesterolemia    3. Diabetic polyneuropathy associated with type 2 diabetes mellitus    4. Diabetic infection of right foot    5. Type 2 diabetes mellitus with foot ulcer, with long-term current use of insulin    6. Foot ulceration, right, with unspecified severity    7. BUDDY (acute kidney injury)         At goal  Stable on statin  Needs better glycemic control.  Start " mounjaro to both reduce sugar and weight and improve wound  As above  As above  As above  Will need to change bactrim to something else    Visit today included increased complexity associated with the care of the episodic problem diabetic foot wound addressed and managing the longitudinal care of the patient due to the serious and/or complex managed problem(s) diabetic food wound.

## 2024-12-12 NOTE — PATIENT INSTRUCTIONS
Start new weekly injection to reduce both sugar and weight  Will send message/letter to Dr. Isabel to change antibiotics    Follow up as scheduled

## 2024-12-13 ENCOUNTER — TELEPHONE (OUTPATIENT)
Dept: FAMILY MEDICINE | Facility: CLINIC | Age: 54
End: 2024-12-13
Payer: COMMERCIAL

## 2024-12-13 NOTE — LETTER
December 13, 2024        Anant Isabel Jr., DPM  [address] [place] [address]             [facility]  [address] [place] [address]  Phone: [phone]   Patient: Garret Munguia   MR Number: [id number]   YOB: 1970   Date of Visit: 12/13/2024     Dear Dr. Isabel,     Garret Munguia is a mutual patient of ours and I saw him in the office yesterday for continued management of his diabetes.  Although his A1C has improved, his sugar is still quite elevated (A1C has improved from 14 to 11%).  I've added medication to further reduce his sugar, but I am worried as he recently had a notable increase in his creatinine.  I fear this may be related to the bactrim he is taking for the foot infection.  Is there a possibility that we could change bactrim to a less renally-acting agent?  Otherwise, I will continue to improve his glycemic control to promote better wound healing.    Sincerely,      Demarco Benítez MD            CC  No Recipients    Enclosure

## 2024-12-16 ENCOUNTER — TELEPHONE (OUTPATIENT)
Dept: FAMILY MEDICINE | Facility: CLINIC | Age: 54
End: 2024-12-16
Payer: COMMERCIAL

## 2025-01-09 ENCOUNTER — PATIENT MESSAGE (OUTPATIENT)
Dept: FAMILY MEDICINE | Facility: CLINIC | Age: 55
End: 2025-01-09
Payer: COMMERCIAL

## 2025-02-14 ENCOUNTER — PATIENT OUTREACH (OUTPATIENT)
Facility: CLINIC | Age: 55
End: 2025-02-14
Payer: COMMERCIAL

## 2025-02-14 NOTE — PROGRESS NOTES
I called patient to offer scheduling diabetes education appointment and patient did not answer. Left voicemail with Patient  phone number for scheduling, if desired.

## 2025-02-20 ENCOUNTER — TELEPHONE (OUTPATIENT)
Dept: FAMILY MEDICINE | Facility: CLINIC | Age: 55
End: 2025-02-20
Payer: COMMERCIAL

## 2025-02-20 NOTE — TELEPHONE ENCOUNTER
----- Message from Sylvain Lloyd sent at 2/20/2025 12:06 PM CST -----  Please advise.  ----- Message -----  From: Vivi Lee  Sent: 2/19/2025   4:13 PM CST  To: [facility] Staff     Type:  Needs Medical AdviceWho Called: Sandra/JOSELINEould the patient rather a call back or a response via [facility]? callBest Call Back Number: [phone] ext [phone] Information:  states did lab work on him today sugar 518 Please call back to advise. Thank you!

## 2025-02-24 ENCOUNTER — TELEPHONE (OUTPATIENT)
Dept: FAMILY MEDICINE | Facility: CLINIC | Age: 55
End: 2025-02-24
Payer: COMMERCIAL

## 2025-02-24 NOTE — TELEPHONE ENCOUNTER
----- Message from Joya sent at 2/24/2025  2:16 PM CST -----  Type: Needs Medical AdviceWho Called:  ptBest Call Back Number: [phone] Information: returning a call from the office about scheduling an appt.  Please call back to advise, thank you!

## 2025-02-25 ENCOUNTER — TELEPHONE (OUTPATIENT)
Dept: FAMILY MEDICINE | Facility: CLINIC | Age: 55
End: 2025-02-25
Payer: COMMERCIAL

## 2025-02-25 NOTE — TELEPHONE ENCOUNTER
----- Message from Kristen sent at 2/25/2025  8:58 AM CST -----  Contact: self  Type:  Patient Returning CallWho Called:  pt Who Left Message for Patient:  Demond Does the patient know what this is regarding?:  not sure has appt 2/26/25 Best Call Back Number:  [phone] Information:  thanks

## 2025-02-26 ENCOUNTER — PATIENT MESSAGE (OUTPATIENT)
Dept: INTERNAL MEDICINE | Facility: CLINIC | Age: 55
End: 2025-02-26
Payer: COMMERCIAL

## 2025-02-26 ENCOUNTER — OFFICE VISIT (OUTPATIENT)
Dept: FAMILY MEDICINE | Facility: CLINIC | Age: 55
End: 2025-02-26
Payer: COMMERCIAL

## 2025-02-26 VITALS
SYSTOLIC BLOOD PRESSURE: 148 MMHG | WEIGHT: 255 LBS | BODY MASS INDEX: 35.7 KG/M2 | OXYGEN SATURATION: 97 % | HEART RATE: 88 BPM | HEIGHT: 71 IN | DIASTOLIC BLOOD PRESSURE: 84 MMHG

## 2025-02-26 DIAGNOSIS — I10 PRIMARY HYPERTENSION: ICD-10-CM

## 2025-02-26 DIAGNOSIS — E11.621 TYPE 2 DIABETES MELLITUS WITH FOOT ULCER, WITH LONG-TERM CURRENT USE OF INSULIN: ICD-10-CM

## 2025-02-26 DIAGNOSIS — L08.9 DIABETIC INFECTION OF RIGHT FOOT: Primary | ICD-10-CM

## 2025-02-26 DIAGNOSIS — L97.509 TYPE 2 DIABETES MELLITUS WITH FOOT ULCER, WITH LONG-TERM CURRENT USE OF INSULIN: ICD-10-CM

## 2025-02-26 DIAGNOSIS — E78.00 PURE HYPERCHOLESTEROLEMIA: ICD-10-CM

## 2025-02-26 DIAGNOSIS — E11.628 DIABETIC INFECTION OF RIGHT FOOT: Primary | ICD-10-CM

## 2025-02-26 DIAGNOSIS — E11.42 DIABETIC POLYNEUROPATHY ASSOCIATED WITH TYPE 2 DIABETES MELLITUS: ICD-10-CM

## 2025-02-26 DIAGNOSIS — Z12.11 SCREEN FOR COLON CANCER: ICD-10-CM

## 2025-02-26 DIAGNOSIS — Z79.4 TYPE 2 DIABETES MELLITUS WITH FOOT ULCER, WITH LONG-TERM CURRENT USE OF INSULIN: ICD-10-CM

## 2025-02-26 PROCEDURE — 3077F SYST BP >= 140 MM HG: CPT | Mod: S$GLB,,, | Performed by: FAMILY MEDICINE

## 2025-02-26 PROCEDURE — G2211 COMPLEX E/M VISIT ADD ON: HCPCS | Mod: S$GLB,,, | Performed by: FAMILY MEDICINE

## 2025-02-26 PROCEDURE — 3079F DIAST BP 80-89 MM HG: CPT | Mod: S$GLB,,, | Performed by: FAMILY MEDICINE

## 2025-02-26 PROCEDURE — 99214 OFFICE O/P EST MOD 30 MIN: CPT | Mod: S$GLB,,, | Performed by: FAMILY MEDICINE

## 2025-02-26 PROCEDURE — 3008F BODY MASS INDEX DOCD: CPT | Mod: S$GLB,,, | Performed by: FAMILY MEDICINE

## 2025-02-26 RX ORDER — INSULIN DEGLUDEC 100 U/ML
20 INJECTION, SOLUTION SUBCUTANEOUS 2 TIMES DAILY
Qty: 12 ML | Refills: 11 | Status: SHIPPED | OUTPATIENT
Start: 2025-02-26 | End: 2026-02-26

## 2025-02-26 RX ORDER — LOSARTAN POTASSIUM 50 MG/1
100 TABLET ORAL DAILY
Qty: 180 TABLET | Refills: 3 | Status: SHIPPED | OUTPATIENT
Start: 2025-02-26

## 2025-02-26 RX ORDER — FLASH GLUCOSE SENSOR
KIT MISCELLANEOUS
Qty: 6 KIT | Refills: 6 | Status: SHIPPED | OUTPATIENT
Start: 2025-02-26

## 2025-02-26 NOTE — PROGRESS NOTES
"    [facility]  Primary Care Clinics - [place], [place]    Family Medicine Office Visit    Chief Complaint   Patient presents with    Hypertension     Patient is here for elevated blood pressure.         HPI:  continues to have markedly high blood sugar in setting of chronic osteo wound to R foot.  Actively in wound care, considering hyperbarics, and now seeing ID.      BP elevated in this setting as well  Hyperlipidemia stable on appropriate intensity statin therapy      ROS: as above    Vitals:    02/26/25 0955 02/26/25 1009   BP: (!) 170/80 (!) 148/84   BP Location: Left arm    Patient Position: Sitting    Pulse: 88    SpO2: 97%    Weight: 115.7 kg (255 lb)    Height: 5' 11" (1.803 m)       Body mass index is 35.57 kg/m².      General:  AOx3, well nourished and developed in no acute distress  Eyes:  PERRLA, EOMI, vision intact grossly  ENT:  normal hearing, moist oral mucosa  Neck:  trachea midline with no masses or thyromegaly  Heart:  RRR, no murmurs.  No edema noted, extremities warm and well perfused  Lungs:  clear to auscultation bilaterally with symmetric chest movement  Abdomen:  Soft, nontender, nondistended.  Normal bowel sounds  Musculoskeletal:  Normal gait.  Normal posture.  Normal muscular development with no joint swelling.  Neurological:  CN II-XII grossly intact. Symmetric strength and sensation  Psych:  Normal mood and affect.  Able to demonstrate good judgement and personal insight.      Assessment/Plan:    1. Diabetic infection of right foot    2. Type 2 diabetes mellitus with foot ulcer, with long-term current use of insulin    3. Pure hypercholesterolemia    4. Primary hypertension    5. Diabetic polyneuropathy associated with type 2 diabetes mellitus       Needs markedly better glycemic control to reduce infection burden  Increase insulin to 20/16  Stable  Increase losartan to 100  As above    Visit today included increased complexity associated with the care of the episodic problem " DM, osteo, htn addressed and managing the longitudinal care of the patient due to the serious and/or complex managed problem(s) DM, osteo, htn.

## 2025-02-26 NOTE — PATIENT INSTRUCTIONS
Increase insulin to 20 units AM, 16 units PM  Increase losartan to 100mg  Will fill diabetic sensors and jardiance    Keep follow up in March    Get colon screen kit

## 2025-03-01 NOTE — TELEPHONE ENCOUNTER
No care due was identified.  [facility] Embedded Care Due Messages. Reference number: [id number].   3/01/2025 3:09:30 PM CST

## 2025-03-02 RX ORDER — HYDROCHLOROTHIAZIDE 12.5 MG/1
12.5 TABLET ORAL
Qty: 90 TABLET | Refills: 3 | OUTPATIENT
Start: 2025-03-02

## 2025-03-02 NOTE — TELEPHONE ENCOUNTER
Refill Decision Note   Garret Munguia  is requesting a refill authorization.  Brief Assessment and Rationale for Refill:  Quick Discontinue     Medication Therapy Plan: HCTZ dc'd on 10/24/24-Pt taking Furosemide 20 mg      Comments:     Note composed:10:28 AM 03/02/2025        `

## 2025-03-10 ENCOUNTER — TELEPHONE (OUTPATIENT)
Dept: FAMILY MEDICINE | Facility: CLINIC | Age: 55
End: 2025-03-10

## 2025-03-17 ENCOUNTER — PATIENT MESSAGE (OUTPATIENT)
Dept: INTERNAL MEDICINE | Facility: CLINIC | Age: 55
End: 2025-03-17
Payer: COMMERCIAL

## 2025-03-26 ENCOUNTER — PATIENT MESSAGE (OUTPATIENT)
Dept: FAMILY MEDICINE | Facility: CLINIC | Age: 55
End: 2025-03-26
Payer: COMMERCIAL

## 2025-04-17 ENCOUNTER — TELEPHONE (OUTPATIENT)
Dept: FAMILY MEDICINE | Facility: CLINIC | Age: 55
End: 2025-04-17
Payer: COMMERCIAL

## 2025-04-17 NOTE — TELEPHONE ENCOUNTER
----- Message from Demarco Benítez MD sent at 4/16/2025  7:21 AM CDT -----  Please call patient and schedule followup - due for further diabetic monitoring

## 2025-04-18 NOTE — TELEPHONE ENCOUNTER
Care Due:                  Date            Visit Type   Department     Provider  --------------------------------------------------------------------------------                                EP -                              PRIMARY      [facility] FAMILY  Last Visit: 02-      CARE ([facility])   MEDICINE       Demarco Benítez  Next Visit: None Scheduled  None         None Found                                                            Last  Test          Frequency    Reason                     Performed    Due Date  --------------------------------------------------------------------------------    HBA1C.......  6 months...  empagliflozin, insulin,    12-   06-                             semaglutide..............    Lipid Panel.  12 months..  rosuvastatin.............  07- 07-    [facility] Embedded Care Due Messages. Reference number: [id number].   4/18/2025 10:24:22 AM CDT

## 2025-04-21 RX ORDER — EMPAGLIFLOZIN 25 MG/1
TABLET, FILM COATED ORAL
Qty: 90 TABLET | Refills: 0 | Status: SHIPPED | OUTPATIENT
Start: 2025-04-21

## 2025-04-21 NOTE — TELEPHONE ENCOUNTER
Refill Routing Note   Medication(s) are not appropriate for processing by [facility] for the following reason(s):        Required labs abnormal    [facility] action(s):  Defer   Requires labs : Yes           Pharmacist review requested: Yes     Appointments  past 12m or future 3m with PCP    Date Provider   Last Visit   2/26/2025 Demarco Benítez MD   Next Visit   Visit date not found Demarco Benítez MD   ED visits in past 90 days: 0        Note composed:10:44 AM 04/21/2025

## 2025-04-23 ENCOUNTER — PATIENT MESSAGE (OUTPATIENT)
Dept: FAMILY MEDICINE | Facility: CLINIC | Age: 55
End: 2025-04-23
Payer: COMMERCIAL

## 2025-04-25 ENCOUNTER — PATIENT MESSAGE (OUTPATIENT)
Dept: ADMINISTRATIVE | Facility: HOSPITAL | Age: 55
End: 2025-04-25
Payer: COMMERCIAL

## 2025-05-01 DIAGNOSIS — L97.509 TYPE 2 DIABETES MELLITUS WITH FOOT ULCER, WITHOUT LONG-TERM CURRENT USE OF INSULIN: ICD-10-CM

## 2025-05-01 DIAGNOSIS — E11.621 TYPE 2 DIABETES MELLITUS WITH FOOT ULCER, WITHOUT LONG-TERM CURRENT USE OF INSULIN: ICD-10-CM

## 2025-05-01 RX ORDER — BLOOD-GLUCOSE,RECEIVER,CONT
EACH MISCELLANEOUS
Qty: 1 EACH | Refills: 0 | Status: SHIPPED | OUTPATIENT
Start: 2025-05-01

## 2025-05-01 RX ORDER — BLOOD-GLUCOSE SENSOR
1 EACH MISCELLANEOUS CONTINUOUS
Qty: 5 EACH | Refills: 6 | Status: SHIPPED | OUTPATIENT
Start: 2025-05-01 | End: 2026-05-01

## 2025-05-01 NOTE — TELEPHONE ENCOUNTER
No care due was identified.  [facility] Embedded Care Due Messages. Reference number: [id number].   5/01/2025 10:34:54 AM CDT

## 2025-05-01 NOTE — TELEPHONE ENCOUNTER
Nestor Benítez,  This  patient needs both type of glucometer the manual and the continuous meter.  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  Please review.  Thank you.  Demond Marie LPN

## 2025-05-01 NOTE — TELEPHONE ENCOUNTER
Last office visit: 2/26/2025  ----- Message from Carmen sent at 5/1/2025 10:03 AM CDT -----  Type:  Sooner Appointment Request Caller is requesting a sooner appointment.  Caller declined first available appointment listed below.  Caller will not accept being placed on the waitlist and is requesting a message be sent to doctor. Name of Caller:Pt When is the first available appointment?05/06 Would the patient rather a call back or a response via [facility]? Call back  Best Call Back Number:[phone] Additional Information: Pt says he needs to see Dr. Benítez As soon as possible he needs a new glucose monitor to check A1C     Please call Back to advise. Thanks!

## 2025-05-02 RX ORDER — BLOOD-GLUCOSE CONTROL, NORMAL
1 EACH MISCELLANEOUS 4 TIMES DAILY PRN
Qty: 200 EACH | Refills: 2 | Status: SHIPPED | OUTPATIENT
Start: 2025-05-02 | End: 2026-05-02

## 2025-05-02 RX ORDER — INSULIN PUMP SYRINGE, 3 ML
EACH MISCELLANEOUS
Qty: 1 EACH | Refills: 0 | Status: SHIPPED | OUTPATIENT
Start: 2025-05-02 | End: 2026-05-01

## 2025-05-06 ENCOUNTER — OFFICE VISIT (OUTPATIENT)
Dept: FAMILY MEDICINE | Facility: CLINIC | Age: 55
End: 2025-05-06
Payer: COMMERCIAL

## 2025-05-06 ENCOUNTER — LAB VISIT (OUTPATIENT)
Dept: LAB | Facility: CLINIC | Age: 55
End: 2025-05-06
Payer: COMMERCIAL

## 2025-05-06 VITALS
HEART RATE: 70 BPM | HEIGHT: 71 IN | DIASTOLIC BLOOD PRESSURE: 88 MMHG | OXYGEN SATURATION: 97 % | WEIGHT: 246.31 LBS | SYSTOLIC BLOOD PRESSURE: 138 MMHG | BODY MASS INDEX: 34.48 KG/M2

## 2025-05-06 DIAGNOSIS — E11.42 DIABETIC POLYNEUROPATHY ASSOCIATED WITH TYPE 2 DIABETES MELLITUS: ICD-10-CM

## 2025-05-06 DIAGNOSIS — L97.509 TYPE 2 DIABETES MELLITUS WITH FOOT ULCER, WITH LONG-TERM CURRENT USE OF INSULIN: ICD-10-CM

## 2025-05-06 DIAGNOSIS — I10 PRIMARY HYPERTENSION: ICD-10-CM

## 2025-05-06 DIAGNOSIS — Z12.11 SCREEN FOR COLON CANCER: ICD-10-CM

## 2025-05-06 DIAGNOSIS — E11.621 TYPE 2 DIABETES MELLITUS WITH FOOT ULCER, WITH LONG-TERM CURRENT USE OF INSULIN: ICD-10-CM

## 2025-05-06 DIAGNOSIS — E78.00 PURE HYPERCHOLESTEROLEMIA: ICD-10-CM

## 2025-05-06 DIAGNOSIS — E11.42 DIABETIC POLYNEUROPATHY ASSOCIATED WITH TYPE 2 DIABETES MELLITUS: Primary | ICD-10-CM

## 2025-05-06 DIAGNOSIS — Z79.4 TYPE 2 DIABETES MELLITUS WITH FOOT ULCER, WITH LONG-TERM CURRENT USE OF INSULIN: ICD-10-CM

## 2025-05-06 LAB
EAG (OHS): 263 MG/DL (ref 68–131)
HBA1C MFR BLD: 10.8 % (ref 4–5.6)

## 2025-05-06 PROCEDURE — 4010F ACE/ARB THERAPY RXD/TAKEN: CPT | Mod: S$GLB,,, | Performed by: FAMILY MEDICINE

## 2025-05-06 PROCEDURE — 36415 COLL VENOUS BLD VENIPUNCTURE: CPT | Mod: ,,, | Performed by: FAMILY MEDICINE

## 2025-05-06 PROCEDURE — 83036 HEMOGLOBIN GLYCOSYLATED A1C: CPT

## 2025-05-06 PROCEDURE — 3008F BODY MASS INDEX DOCD: CPT | Mod: S$GLB,,, | Performed by: FAMILY MEDICINE

## 2025-05-06 PROCEDURE — 3075F SYST BP GE 130 - 139MM HG: CPT | Mod: S$GLB,,, | Performed by: FAMILY MEDICINE

## 2025-05-06 PROCEDURE — 99214 OFFICE O/P EST MOD 30 MIN: CPT | Mod: S$GLB,,, | Performed by: FAMILY MEDICINE

## 2025-05-06 PROCEDURE — G2211 COMPLEX E/M VISIT ADD ON: HCPCS | Mod: S$GLB,,, | Performed by: FAMILY MEDICINE

## 2025-05-06 PROCEDURE — 1159F MED LIST DOCD IN RCRD: CPT | Mod: S$GLB,,, | Performed by: FAMILY MEDICINE

## 2025-05-06 PROCEDURE — 3079F DIAST BP 80-89 MM HG: CPT | Mod: S$GLB,,, | Performed by: FAMILY MEDICINE

## 2025-05-06 RX ORDER — BLOOD-GLUCOSE CONTROL, NORMAL
1 EACH MISCELLANEOUS 4 TIMES DAILY PRN
Qty: 200 EACH | Refills: 2 | Status: SHIPPED | OUTPATIENT
Start: 2025-05-06 | End: 2026-05-06

## 2025-05-06 RX ORDER — CLONIDINE HYDROCHLORIDE 0.2 MG/1
TABLET ORAL
COMMUNITY
Start: 2025-05-03

## 2025-05-06 RX ORDER — INSULIN PUMP SYRINGE, 3 ML
EACH MISCELLANEOUS
Qty: 1 EACH | Refills: 0 | Status: SHIPPED | OUTPATIENT
Start: 2025-05-06 | End: 2026-05-06

## 2025-05-06 NOTE — PATIENT INSTRUCTIONS
Check A1C to see if sugar is improving  Filled blood sugar testing supplies    Get home colon test    Double losartan to 100mg (50mg twice a day) to better control pressure so you can get hyperbarics

## 2025-05-06 NOTE — PROGRESS NOTES
"    [facility] - [place], [place]    Family Medicine Office Visit    Chief Complaint   Patient presents with    Follow-up        HPI:  followup for poorly controlled diabetes.  Has chronic osteo/wound to foot, and will be starting hyperbaric therapy.      Blood Pressure at goal on medication, with patient reporting prescription compliance  Hyperlipidemia stable on appropriate intensity statin therapy  DM not at goal, which is impeding wound healing    Needs diabetic testing supplies, as he can't have CGM while in hyperbarics    Needs FIT test    ROS: as above    Vitals:    05/06/25 1340   BP: 138/88   BP Location: Left arm   Patient Position: Sitting   Pulse: 70   SpO2: 97%   Weight: 111.7 kg (246 lb 4.8 oz)   Height: 5' 11" (1.803 m)      Body mass index is 34.35 kg/m².      General:  AOx3, well nourished and developed in no acute distress  Eyes:  PERRLA, EOMI, vision intact grossly  ENT:  normal hearing, moist oral mucosa  Neck:  trachea midline with no masses or thyromegaly  Heart:  RRR, no murmurs.  No edema noted, extremities warm and well perfused  Lungs:  clear to auscultation bilaterally with symmetric chest movement  Abdomen:  Soft, nontender, nondistended.  Normal bowel sounds  Musculoskeletal:  R foot bandaged in walking boot  Neurological:  CN II-XII grossly intact. Symmetric strength and sensation  Psych:  Normal mood and affect.  Able to demonstrate good judgement and personal insight.      Assessment/Plan:    1. Diabetic polyneuropathy associated with type 2 diabetes mellitus    2. Primary hypertension    3. Pure hypercholesterolemia    4. Type 2 diabetes mellitus with foot ulcer, with long-term current use of insulin       Check A1C to assess glycemic progress  At goal  Stable  As above.  Use regular diabetic testing supplies until hyperbarics done    Visit today included increased complexity associated with the care of the episodic problem dm, htn, hld, wound addressed " and managing the longitudinal care of the patient due to the serious and/or complex managed problem(s) dm, htn, hld, wound.

## 2025-05-07 ENCOUNTER — RESULTS FOLLOW-UP (OUTPATIENT)
Dept: FAMILY MEDICINE | Facility: CLINIC | Age: 55
End: 2025-05-07

## 2025-05-15 NOTE — TELEPHONE ENCOUNTER
Nestor Benítez,  Please review message below from this patient.  Call placed to pt due msg left, spoke w/pt states returning a call due to lab results and Dr. Benítez's orders. Reviewed chart noted Dr. Benítez's orders-given with instructions to tell pt to increase Tresiba to 30 units and resume Ozempic shot. Patient states need a Rx for the Ozempic states called for refill but never received it.  Please review.  Demond Marie LPN                5/7/25 11:14 AM  Result Note  Please contact the patient and let them know that their results show some slight improvement in blood sugar, but not enough to heal his foot.  Increase tresiba (insulin) to 30 units twice a day.  Can also resume his ozempic shot  Hemoglobin A1c        ----- Message from Penny sent at 5/15/2025  1:28 PM CDT -----  Type:  Patient Returning CallWho Called: pt Who Left Message for Patient: Makel Does the patient know what this is regarding?: results Would the patient rather a call back or a response via [facility]? callBest Call Back Number: [phone] Information: pt has tried to return the missed call several times now and cannot seem to get ahold of anyone when he calls back. Please call him back and also send the results via portal.   Please call back to advise. Thanks!

## 2025-05-15 NOTE — TELEPHONE ENCOUNTER
No care due was identified.  [facility] Embedded Care Due Messages. Reference number: [id number].   5/15/2025 2:04:31 PM CDT

## 2025-05-28 ENCOUNTER — PATIENT MESSAGE (OUTPATIENT)
Dept: FAMILY MEDICINE | Facility: CLINIC | Age: 55
End: 2025-05-28
Payer: COMMERCIAL

## 2025-06-16 ENCOUNTER — TELEPHONE (OUTPATIENT)
Dept: FAMILY MEDICINE | Facility: CLINIC | Age: 55
End: 2025-06-16
Payer: COMMERCIAL

## 2025-06-16 NOTE — TELEPHONE ENCOUNTER
Type: Needs Medical Advice  Who Called:  pt  Best Call Back Number: [phone]  Additional Information: Pt is calling to speak with nurse, vague. Please call back to advise, thanks!

## 2025-06-26 ENCOUNTER — OFFICE VISIT (OUTPATIENT)
Dept: FAMILY MEDICINE | Facility: CLINIC | Age: 55
End: 2025-06-26
Payer: COMMERCIAL

## 2025-06-26 VITALS
OXYGEN SATURATION: 96 % | HEIGHT: 71 IN | BODY MASS INDEX: 36.22 KG/M2 | DIASTOLIC BLOOD PRESSURE: 80 MMHG | HEART RATE: 72 BPM | WEIGHT: 258.69 LBS | SYSTOLIC BLOOD PRESSURE: 138 MMHG

## 2025-06-26 DIAGNOSIS — I10 PRIMARY HYPERTENSION: ICD-10-CM

## 2025-06-26 DIAGNOSIS — Z12.11 SCREEN FOR COLON CANCER: ICD-10-CM

## 2025-06-26 DIAGNOSIS — E11.628 DIABETIC INFECTION OF RIGHT FOOT: ICD-10-CM

## 2025-06-26 DIAGNOSIS — E78.49 OTHER HYPERLIPIDEMIA: ICD-10-CM

## 2025-06-26 DIAGNOSIS — E78.2 MIXED HYPERLIPIDEMIA: ICD-10-CM

## 2025-06-26 DIAGNOSIS — E11.42 DIABETIC POLYNEUROPATHY ASSOCIATED WITH TYPE 2 DIABETES MELLITUS: Primary | ICD-10-CM

## 2025-06-26 DIAGNOSIS — L97.509 TYPE 2 DIABETES MELLITUS WITH FOOT ULCER, WITH LONG-TERM CURRENT USE OF INSULIN: ICD-10-CM

## 2025-06-26 DIAGNOSIS — Z79.4 TYPE 2 DIABETES MELLITUS WITH FOOT ULCER, WITH LONG-TERM CURRENT USE OF INSULIN: ICD-10-CM

## 2025-06-26 DIAGNOSIS — E11.621 TYPE 2 DIABETES MELLITUS WITH FOOT ULCER, WITH LONG-TERM CURRENT USE OF INSULIN: ICD-10-CM

## 2025-06-26 DIAGNOSIS — L08.9 DIABETIC INFECTION OF RIGHT FOOT: ICD-10-CM

## 2025-06-26 PROCEDURE — 80061 LIPID PANEL: CPT | Performed by: FAMILY MEDICINE

## 2025-06-26 PROCEDURE — 83036 HEMOGLOBIN GLYCOSYLATED A1C: CPT | Performed by: FAMILY MEDICINE

## 2025-06-26 PROCEDURE — 80053 COMPREHEN METABOLIC PANEL: CPT | Performed by: FAMILY MEDICINE

## 2025-06-26 PROCEDURE — 85025 COMPLETE CBC W/AUTO DIFF WBC: CPT | Performed by: FAMILY MEDICINE

## 2025-06-26 RX ORDER — METOPROLOL SUCCINATE 50 MG/1
50 TABLET, EXTENDED RELEASE ORAL DAILY
Qty: 90 TABLET | Refills: 3 | Status: SHIPPED | OUTPATIENT
Start: 2025-06-26 | End: 2025-06-26

## 2025-06-26 NOTE — PATIENT INSTRUCTIONS
Get labs today  Will change medication depending your lab results  May consider sending you to Dr. Holden for possible insulin pump    Get colon screen kit

## 2025-06-26 NOTE — PROGRESS NOTES
"    [facility] - [place], [place]    Family Medicine Office Visit    Chief Complaint   Patient presents with    Follow-up        HPI:  followup:    Poorly controlled DM which leads to chronic osteo of foot and impaired wound healing.  Compliance has been issue historically - he isn't taking his insulin regularly    BP notable elevation today  Hyperlipidemia stable on appropriate intensity statin therapy  Notable BUDDY last check    ROS: as above    Vitals:    06/26/25 1317 06/26/25 1331   BP: (!) 160/76 138/80   BP Location: Left arm    Patient Position: Sitting    Pulse: 72    SpO2: 96%    Weight: 117.4 kg (258 lb 11.4 oz)    Height: 5' 11" (1.803 m)       Body mass index is 36.08 kg/m².      General:  AOx3, well nourished and developed in no acute distress  Eyes:  PERRLA, EOMI, vision intact grossly  ENT:  normal hearing, moist oral mucosa  Neck:  trachea midline with no masses or thyromegaly  Heart:  RRR, no murmurs.  No edema noted, extremities warm and well perfused  Lungs:  clear to auscultation bilaterally with symmetric chest movement  Abdomen:  Soft, nontender, nondistended.  Normal bowel sounds  Musculoskeletal:  Normal gait.  Normal posture.  Normal muscular development with no joint swelling.  Neurological:  CN II-XII grossly intact. Symmetric strength and sensation  Psych:  Normal mood and affect.  Able to demonstrate good judgement and personal insight.      Assessment/Plan:    1. Diabetic polyneuropathy associated with type 2 diabetes mellitus  -     Comprehensive Metabolic Panel; Future; Expected date: 06/26/2025  -     CBC Auto Differential; Future; Expected date: 06/26/2025  -     Hemoglobin A1C; Future; Expected date: 06/26/2025  -     Lipid Panel; Future; Expected date: 06/26/2025    2. Primary hypertension    3. Mixed hyperlipidemia    4. Other hyperlipidemia    5. Type 2 diabetes mellitus with foot ulcer, with long-term current use of insulin  -     Ambulatory " referral/consult to Endocrinology; Future; Expected date: 07/03/2025  -     Comprehensive Metabolic Panel; Future; Expected date: 06/26/2025  -     CBC Auto Differential; Future; Expected date: 06/26/2025  -     Hemoglobin A1C; Future; Expected date: 06/26/2025    6. Diabetic infection of right foot    Other orders  -     Discontinue: metoprolol succinate (TOPROL-XL) 50 MG 24 hr tablet; Take 1 tablet (50 mg total) by mouth once daily.  Dispense: 90 tablet; Refill: 3           Get labs today, needs markedly better control.  Get labs today.  Will send to endocrine for likely insulin pump vs omnipod  At goal on recheck  Stable  Stable  Poor control.  Send to endo and get labs today  I have legit fears he may lose his foot if he doesn't improve his control    Visit today included increased complexity associated with the care of the episodic problem dm, osteo, htn addressed and managing the longitudinal care of the patient due to the serious and/or complex managed problem(s) dm, osteo, htn.

## 2025-06-27 ENCOUNTER — RESULTS FOLLOW-UP (OUTPATIENT)
Dept: FAMILY MEDICINE | Facility: CLINIC | Age: 55
End: 2025-06-27

## 2025-06-30 ENCOUNTER — TELEPHONE (OUTPATIENT)
Dept: FAMILY MEDICINE | Facility: CLINIC | Age: 55
End: 2025-06-30
Payer: COMMERCIAL

## 2025-07-14 ENCOUNTER — PATIENT MESSAGE (OUTPATIENT)
Dept: ADMINISTRATIVE | Facility: HOSPITAL | Age: 55
End: 2025-07-14
Payer: COMMERCIAL

## 2025-07-16 ENCOUNTER — PATIENT MESSAGE (OUTPATIENT)
Dept: FAMILY MEDICINE | Facility: CLINIC | Age: 55
End: 2025-07-16
Payer: COMMERCIAL

## 2025-07-17 DIAGNOSIS — L97.509 TYPE 2 DIABETES MELLITUS WITH FOOT ULCER, WITH LONG-TERM CURRENT USE OF INSULIN: Primary | ICD-10-CM

## 2025-07-17 DIAGNOSIS — E11.621 TYPE 2 DIABETES MELLITUS WITH FOOT ULCER, WITH LONG-TERM CURRENT USE OF INSULIN: Primary | ICD-10-CM

## 2025-07-17 DIAGNOSIS — Z79.4 TYPE 2 DIABETES MELLITUS WITH FOOT ULCER, WITH LONG-TERM CURRENT USE OF INSULIN: Primary | ICD-10-CM

## 2025-07-18 NOTE — TELEPHONE ENCOUNTER
No care due was identified.  [facility] Embedded Care Due Messages. Reference number: [id number].   7/17/2025 7:22:55 PM CDT

## 2025-07-23 RX ORDER — EMPAGLIFLOZIN 25 MG/1
TABLET, FILM COATED ORAL
Qty: 90 TABLET | Refills: 0 | Status: SHIPPED | OUTPATIENT
Start: 2025-07-23

## 2025-07-24 ENCOUNTER — PATIENT MESSAGE (OUTPATIENT)
Dept: ADMINISTRATIVE | Facility: HOSPITAL | Age: 55
End: 2025-07-24
Payer: COMMERCIAL

## 2025-07-30 ENCOUNTER — TELEPHONE (OUTPATIENT)
Dept: FAMILY MEDICINE | Facility: CLINIC | Age: 55
End: 2025-07-30
Payer: COMMERCIAL

## 2025-08-06 RX ORDER — INSULIN DEGLUDEC 100 U/ML
20 INJECTION, SOLUTION SUBCUTANEOUS 2 TIMES DAILY
Qty: 45 ML | Refills: 1 | Status: SHIPPED | OUTPATIENT
Start: 2025-08-06

## 2025-08-06 NOTE — TELEPHONE ENCOUNTER
Last office visit: 6/26/25  Upcoming Appointment: 8/21/25  Copied from CRM #[id number]. Topic: Medications - Medication Refill  >> Aug 6, 2025  1:01 PM Doretha wrote:  Type:  RX Refill Request    Who Called:  pt   Refill or New Rx: refill  RX Name and Strength: insulin degludec (TRESIBA FLEXTOUCH U-100) 100 unit/mL (3 mL) insulin pen and glucose monitor   How is the patient currently taking it? (ex. 1XDay):   Is this a 30 day or 90 day RX:  Preferred Pharmacy with phone number:   [id number] - [place], [place] - [address]  [address]  [place] [address]  Phone: [phone] Fax: [phone]    [id number] - [place] - [address]  [address]  [place] [address]  Phone: [phone] Fax: [phone]    [id number] - [place], [place] - [address]  [address]  [place] [address]  Phone: [phone] Fax: [phone]      Local or Mail Order: local  Ordering Provider: donald  Would the patient rather a call back or a response via [facility]?  call  Best Call Back Number:Telephone Information:  Mobile          [phone]      Additional Information: please to advise pt is unsure of what he needs for his glucose

## 2025-08-06 NOTE — TELEPHONE ENCOUNTER
Refill Decision Note   Garret Munguia  is requesting a refill authorization.  Brief Assessment and Rationale for Refill:  Approve     Medication Therapy Plan:  No dose adjustment recommended per renal function.       Comments:     Note composed:3:35 PM 08/06/2025

## 2025-08-06 NOTE — TELEPHONE ENCOUNTER
No care due was identified.  [facility] Embedded Care Due Messages. Reference number: [id number].   8/06/2025 3:27:22 PM CDT